# Patient Record
Sex: FEMALE | Race: BLACK OR AFRICAN AMERICAN | Employment: OTHER | URBAN - METROPOLITAN AREA
[De-identification: names, ages, dates, MRNs, and addresses within clinical notes are randomized per-mention and may not be internally consistent; named-entity substitution may affect disease eponyms.]

---

## 2017-03-06 RX ORDER — LORATADINE 10 MG/1
TABLET ORAL
Qty: 30 TAB | Refills: 7 | Status: SHIPPED | OUTPATIENT
Start: 2017-03-06 | End: 2017-11-07 | Stop reason: SDUPTHER

## 2017-03-06 RX ORDER — HYDROCHLOROTHIAZIDE 25 MG/1
TABLET ORAL
Qty: 90 TAB | Refills: 1 | Status: SHIPPED | OUTPATIENT
Start: 2017-03-06 | End: 2017-08-30 | Stop reason: SDUPTHER

## 2017-03-30 ENCOUNTER — HOSPITAL ENCOUNTER (EMERGENCY)
Age: 67
Discharge: HOME | End: 2017-03-30
Attending: EMERGENCY MEDICINE | Admitting: EMERGENCY MEDICINE
Payer: MEDICARE

## 2017-03-30 VITALS
SYSTOLIC BLOOD PRESSURE: 137 MMHG | HEART RATE: 79 BPM | TEMPERATURE: 98 F | OXYGEN SATURATION: 97 % | BODY MASS INDEX: 28.35 KG/M2 | WEIGHT: 160 LBS | RESPIRATION RATE: 16 BRPM | HEIGHT: 63 IN | DIASTOLIC BLOOD PRESSURE: 72 MMHG

## 2017-03-30 DIAGNOSIS — L30.9 DERMATITIS: ICD-10-CM

## 2017-03-30 DIAGNOSIS — J30.2 SEASONAL ALLERGIC RHINITIS, UNSPECIFIED ALLERGIC RHINITIS TRIGGER: Primary | ICD-10-CM

## 2017-03-30 PROCEDURE — 74011250637 HC RX REV CODE- 250/637: Performed by: EMERGENCY MEDICINE

## 2017-03-30 PROCEDURE — 99284 EMERGENCY DEPT VISIT MOD MDM: CPT

## 2017-03-30 PROCEDURE — 74011636637 HC RX REV CODE- 636/637: Performed by: EMERGENCY MEDICINE

## 2017-03-30 RX ORDER — CETIRIZINE HCL 10 MG
10 TABLET ORAL DAILY
Qty: 20 TAB | Refills: 0 | Status: SHIPPED | OUTPATIENT
Start: 2017-03-30 | End: 2018-08-13 | Stop reason: ALTCHOICE

## 2017-03-30 RX ORDER — PREDNISONE 20 MG/1
20 TABLET ORAL DAILY
Qty: 5 TAB | Refills: 0 | Status: SHIPPED | OUTPATIENT
Start: 2017-03-30 | End: 2017-04-04

## 2017-03-30 RX ORDER — PREDNISONE 20 MG/1
20 TABLET ORAL
Status: COMPLETED | OUTPATIENT
Start: 2017-03-30 | End: 2017-03-30

## 2017-03-30 RX ORDER — FAMOTIDINE 20 MG/1
20 TABLET, FILM COATED ORAL
Status: COMPLETED | OUTPATIENT
Start: 2017-03-30 | End: 2017-03-30

## 2017-03-30 RX ADMIN — PREDNISONE 20 MG: 20 TABLET ORAL at 14:16

## 2017-03-30 RX ADMIN — FAMOTIDINE 20 MG: 20 TABLET, FILM COATED ORAL at 14:16

## 2017-03-30 NOTE — ED PROVIDER NOTES
HPI Comments: Monica Locke is a 75 y.o. female with PMHx significant for HTN, recurrent sinusitis, and insomnia who presents ambulatory to the ED with c/o redness of the face with associated itching. Pt reports that this has happened 4 times this month and it usually lasts for a period of two days. Her face becomes erythematous and swollen suddenly and slowly decreases back to normal. Pt reports trying Benadryl and Cortizone-10 cream without much relief. Pt denies using a new make up, lotion, body wash, or detergent. Pt specifically denies any chest tightness, SOB, difficulty swallowing, difficulty breathing, fevers or chills. PCP: Fiona Cervantes MD     Social hx: - Smoker, + EtOH, - Illicit Drugs    There are no other complaints, changes, or physical findings at this time. Written by Vernona Opitz, ED Scribcarmen, as dictated by Melvina Ledemza MD.      The history is provided by the patient. No  was used. Past Medical History:   Diagnosis Date    Contact dermatitis and other eczema, due to unspecified cause     Hot flashes, menopausal 4/21/2010    HTN (hypertension) 3/29/2010    Insomnia 3/29/2010    Knee pain, right 6/4/2015    Osteopenia 4/21/2010    Prediabetes 6/8/2012    Recurrent sinusitis 3/7/2016    Screening for osteoporosis 3/7/2016       Past Surgical History:   Procedure Laterality Date    ENDOSCOPY, COLON, DIAGNOSTIC      in 2009    HX HYSTERECTOMY           Family History:   Problem Relation Age of Onset    Stroke Mother     Hypertension Sister     Hypertension Brother     Hypertension Sister     Hypertension Brother        Social History     Social History    Marital status:      Spouse name: N/A    Number of children: N/A    Years of education: N/A     Occupational History    Not on file.      Social History Main Topics    Smoking status: Never Smoker    Smokeless tobacco: Never Used      Comment: quit 30 yrs ago    Alcohol use No Comment: occation    Drug use: No    Sexual activity: Yes     Partners: Male     Other Topics Concern    Not on file     Social History Narrative         ALLERGIES: Review of patient's allergies indicates no known allergies. Review of Systems   Constitutional: Negative for chills and fever. HENT: Negative for congestion, rhinorrhea and trouble swallowing. Eyes: Negative for visual disturbance. Respiratory: Negative for cough, chest tightness and shortness of breath. Cardiovascular: Negative for chest pain. Gastrointestinal: Negative for abdominal pain, nausea and vomiting. Genitourinary: Negative for difficulty urinating and dysuria. Musculoskeletal: Negative for arthralgias, back pain and neck pain. Skin: Positive for color change (redness) and rash. Neurological: Negative for dizziness, weakness and headaches. Vitals:    03/30/17 1322   BP: 137/72   Pulse: 79   Resp: 16   Temp: 98 °F (36.7 °C)   SpO2: 97%   Weight: 72.6 kg (160 lb)   Height: 5' 3\" (1.6 m)            Physical Exam   Constitutional: She is oriented to person, place, and time. She appears well-developed and well-nourished. Eyes: EOM are normal.   Neck: Normal range of motion. Cardiovascular: Normal rate, regular rhythm, normal heart sounds and intact distal pulses. Pulmonary/Chest: Effort normal and breath sounds normal.   - Respiratory distress. Abdominal: Soft. Bowel sounds are normal.   Neurological: She is alert and oriented to person, place, and time. Skin: Skin is warm and dry.   + Mild erythema of forehead and cheeks. - Angioedema. Nursing note and vitals reviewed.        MDM  Number of Diagnoses or Management Options  Dermatitis:   Seasonal allergic rhinitis, unspecified allergic rhinitis trigger:   Diagnosis management comments: DDx: Contact dermatitis, seasonal allergies, medication reaction, autoimmune disorder       Amount and/or Complexity of Data Reviewed  Review and summarize past medical records: yes    Patient Progress  Patient progress: stable        Procedures  Progress note:  2:03 PM  Pt agrees to follow up as recommended. All questions were answered. Her vital signs are stable for discharge. Written by BRI Flowers, as dictated by Yon Arnold MD.    MEDICATIONS GIVEN:  Medications   predniSONE (DELTASONE) tablet 20 mg (not administered)   famotidine (PEPCID) tablet 20 mg (not administered)       IMPRESSION:  1. Seasonal allergic rhinitis, unspecified allergic rhinitis trigger    2. Dermatitis        PLAN:  1. Current Discharge Medication List      START taking these medications    Details   predniSONE (DELTASONE) 20 mg tablet Take 1 Tab by mouth daily for 5 days. With Breakfast  Qty: 5 Tab, Refills: 0      camphor-menthol (SARNA ANTI-ITCH) 0.5-0.5 % lotion Apply to affected areas 1-2 times a day as need for itching  Qty: 1 Bottle, Refills: 0      cetirizine (ZYRTEC) 10 mg tablet Take 1 Tab by mouth daily. Qty: 20 Tab, Refills: 0         CONTINUE these medications which have NOT CHANGED    Details   aspirin 81 mg chewable tablet Take 1 Tab by mouth daily. Qty: 30 Tab, Refills: 11         STOP taking these medications       loratadine-pseudoephedrine (CLARITIN-D 24 HOUR)  mg per tablet Comments:   Reason for Stoppin.   Follow-up Information     Follow up With Details Comments Contact Info    [facility] EMERGENCY DEPT  If symptoms worsen [address] [facility] Tawanna Morrison MD Schedule an appointment as soon as possible for a visit If symptoms worsen [address] [place] [address]  [phone]          Return to ED if worse     DISCHARGE NOTE:  2:07 PM  The patient is ready for discharge. The patients signs, symptoms, diagnosis, and instructions for discharge have been discussed and the pt has conveyed their understanding.  The patient is to follow up as recommended with Audrey Sales Darshan Cantor MD or return to the ER should their symptoms worsen. Plan has been discussed and patient has conveyed their agreement. This note is prepared by Elkin Linares, acting as Scribe for Trace Esquivel MD.    Trace Esquivel MD: The scribe's documentation has been prepared under my direction and personally reviewed by me in its entirety. I confirm that the note above accurately reflects all work, treatment, procedures, and medical decision making performed by me.

## 2017-03-30 NOTE — DISCHARGE INSTRUCTIONS
Managing Your Allergies: Care Instructions  Your Care Instructions  Managing your allergies is an important part of staying healthy. Your doctor will help you find out what may be causing the allergies. Common causes of allergy symptoms are house dust and dust mites, animal dander, mold, and pollen. As soon as you know what triggers your symptoms, try to reduce your exposure to your triggers. This can help prevent allergy symptoms, asthma, and other health problems. Ask your doctor about allergy medicine or immunotherapy. These treatments may help reduce or prevent allergy symptoms. Follow-up care is a key part of your treatment and safety. Be sure to make and go to all appointments, and call your doctor if you are having problems. It's also a good idea to know your test results and keep a list of the medicines you take. How can you care for yourself at home? · If you think that dust or dust mites are causing your allergies:  ¨ Wash sheets, pillowcases, and other bedding every week in hot water. ¨ Use airtight, dust-proof covers for pillows, duvets, and mattresses. Avoid plastic covers, because they tend to tear quickly and do not \"breathe. \" Wash according to the instructions. ¨ Remove extra blankets and pillows that you don't need. ¨ Use blankets that are machine-washable. ¨ Don't use home humidifiers. They can help mites live longer. · Use air-conditioning. Change or clean all filters every month. Keep windows closed. Use high-efficiency air filters. Don't use window or attic fans, which draw dust into the air. · If you're allergic to pet dander, keep pets outside or, at the very least, out of your bedroom. Old carpet and cloth-covered furniture can hold a lot of animal dander. You may need to replace them. · Look for signs of cockroaches. Use cockroach baits to get rid of them. Then clean your home well. · If you're allergic to mold, don't keep indoor plants, because molds can grow in soil.  Get rid of furniture, rugs, and drapes that smell musty. Check for mold in the bathroom. · If you're allergic to pollen, stay inside when pollen counts are high. · Don't smoke or let anyone else smoke in your house. Don't use fireplaces or wood-burning stoves. Avoid paint fumes, perfumes, and other strong odors. When should you call for help? Give an epinephrine shot if:  · You think you are having a severe allergic reaction. · You have symptoms in more than one body area, such as mild nausea and an itchy mouth. After giving an epinephrine shot call 911, even if you feel better. Call 911 if:  · You have symptoms of a severe allergic reaction. These may include:  ¨ Sudden raised, red areas (hives) all over your body. ¨ Swelling of the throat, mouth, lips, or tongue. ¨ Trouble breathing. ¨ Passing out (losing consciousness). Or you may feel very lightheaded or suddenly feel weak, confused, or restless. · You have been given an epinephrine shot, even if you feel better. Call your doctor now or seek immediate medical care if:  · You have symptoms of an allergic reaction, such as:  ¨ A rash or hives (raised, red areas on the skin). ¨ Itching. ¨ Swelling. ¨ Belly pain, nausea, or vomiting. Watch closely for changes in your health, and be sure to contact your doctor if:  · Your allergies get worse. · You need help controlling your allergies. · You have questions about allergy testing. · You do not get better as expected. Where can you learn more? Go to http://gosia-ailyn.info/. Enter L249 in the search box to learn more about \"Managing Your Allergies: Care Instructions. \"  Current as of: February 12, 2016  Content Version: 11.2  © 5203-7663 [employer]. Care instructions adapted under license by [employer] (which disclaims liability or warranty for this information).  If you have questions about a medical condition or this instruction, always ask your healthcare jordyn. [address] any warranty or liability for your use of this information. Dermatitis: Care Instructions  Your Care Instructions  Dermatitis is the general name used for any rash or inflammation of the skin. Different kinds of dermatitis cause different kinds of rashes. Common causes of a rash include new medicines, plants (such as poison oak or poison ivy), heat, and stress. Certain illnesses can also cause a rash. An allergic reaction to something that touches your skin, such as latex, nickel, or poison ivy, is called contact dermatitis. Contact dermatitis may also be caused by something that irritates the skin, such as bleach, a chemical, or soap. These types of rashes cannot be spread from person to person. How long your rash will last depends on what caused it. Rashes may last a few days or months. Follow-up care is a key part of your treatment and safety. Be sure to make and go to all appointments, and call your doctor if you are having problems. It's also a good idea to know your test results and keep a list of the medicines you take. How can you care for yourself at home? · Do not scratch the rash. Cut your nails short, and file them smooth. Or wear gloves if this helps keep you from scratching. · Wash the area with water only. Pat dry. · Put cold, wet cloths on the rash to reduce itching. · Keep cool, and stay out of the sun. · Leave the rash open to the air as much as possible. · If the rash itches, use hydrocortisone cream. Follow the directions on the label. Calamine lotion may help for plant rashes. · Take an over-the-counter antihistamine, such as diphenhydramine (Benadryl) or loratadine (Claritin), to help calm the itching. Read and follow all instructions on the label. · If your doctor prescribed a cream, use it as directed. If your doctor prescribed medicine, take it exactly as directed. When should you call for help?   Call your doctor now or seek immediate medical care if:  · You have symptoms of infection, such as:  ¨ Increased pain, swelling, warmth, or redness. ¨ Red streaks leading from the area. ¨ Pus draining from the area. ¨ A fever. · You have joint pain along with the rash. Watch closely for changes in your health, and be sure to contact your doctor if:  · Your rash is changing or getting worse. · You are not getting better as expected. Where can you learn more? Go to http://gosia-ailyn.info/. Enter [phone] [id number] [id number] in the search box to learn more about \"Dermatitis: Care Instructions. \"  Current as of: October 13, 2016  Content Version: 11.2  © 3092-4774 [employer]. Care instructions adapted under license by [employer] (which disclaims liability or warranty for this information). If you have questions about a medical condition or this instruction, always ask your healthcare professional. [address] any warranty or liability for your use of this information.

## 2017-03-30 NOTE — ED NOTES
Discharge instructions reviewed with patient. Patient able to verbalize events which would require immediate follow up.   Patient left ambulatory

## 2017-04-22 ENCOUNTER — APPOINTMENT (OUTPATIENT)
Dept: GENERAL RADIOLOGY | Age: 67
End: 2017-04-22
Attending: NURSE PRACTITIONER
Payer: COMMERCIAL

## 2017-04-22 ENCOUNTER — HOSPITAL ENCOUNTER (EMERGENCY)
Age: 67
Discharge: HOME | End: 2017-04-22
Attending: EMERGENCY MEDICINE | Admitting: EMERGENCY MEDICINE
Payer: COMMERCIAL

## 2017-04-22 VITALS
RESPIRATION RATE: 19 BRPM | WEIGHT: 160 LBS | OXYGEN SATURATION: 96 % | SYSTOLIC BLOOD PRESSURE: 151 MMHG | HEIGHT: 64 IN | TEMPERATURE: 99.6 F | DIASTOLIC BLOOD PRESSURE: 79 MMHG | HEART RATE: 94 BPM | BODY MASS INDEX: 27.31 KG/M2

## 2017-04-22 DIAGNOSIS — J06.9 ACUTE UPPER RESPIRATORY INFECTION: Primary | ICD-10-CM

## 2017-04-22 PROCEDURE — 71020 XR CHEST PA LAT: CPT

## 2017-04-22 PROCEDURE — 99282 EMERGENCY DEPT VISIT SF MDM: CPT

## 2017-04-22 RX ORDER — AZITHROMYCIN 250 MG/1
TABLET, FILM COATED ORAL
Qty: 6 TAB | Refills: 0 | Status: SHIPPED | OUTPATIENT
Start: 2017-04-22 | End: 2017-04-27

## 2017-04-22 RX ORDER — BENZONATATE 100 MG/1
100 CAPSULE ORAL
Qty: 30 CAP | Refills: 0 | Status: SHIPPED | OUTPATIENT
Start: 2017-04-22 | End: 2017-04-29

## 2017-04-22 RX ORDER — GUAIFENESIN 400 MG/1
400 TABLET ORAL
Qty: 15 TAB | Refills: 0 | Status: SHIPPED | OUTPATIENT
Start: 2017-04-22 | End: 2018-08-13 | Stop reason: ALTCHOICE

## 2017-04-22 RX ORDER — ALBUTEROL SULFATE 90 UG/1
2 AEROSOL, METERED RESPIRATORY (INHALATION)
Qty: 1 INHALER | Refills: 0 | Status: SHIPPED | OUTPATIENT
Start: 2017-04-22 | End: 2018-08-13 | Stop reason: ALTCHOICE

## 2017-04-22 NOTE — ED NOTES
Patient (s)  given copy of dc instructions and 4 script(s). Patient(s)  verbalized understanding of instructions and script (s). Patient given a current medication reconciliation form and verbalized understanding of their medications. Patient (s) verbalized understanding of the importance of discussing medications with  his or her physician or clinic when they follow up. Patient alert and oriented and in no acute distress. Pt verbalizes pain scale of 5 out of 10. Patient discharged home ambulatory .     Patient was discharged by the provider and declined a wheelchair at discharge

## 2017-04-22 NOTE — DISCHARGE INSTRUCTIONS
Learning About COPD and Upper Respiratory Infections  What are upper respiratory infections? An upper respiratory infection, also called a URI, is an infection of the nose, sinuses, or throat. Viruses or bacteria can cause URIs. Colds, the flu, and sinusitis are examples of URIs. These infections are spread by coughs, sneezes, and close contact. How do these infections affect COPD? A URI can worsen COPD symptoms, such as having too much mucus in your lungs, coughing, or being short of breath. What can you do to manage most infections at home? · Do not smoke. Avoid secondhand smoke. · Take an over-the-counter pain medicine, such as acetaminophen (Tylenol), ibuprofen (Advil, Motrin), or naproxen (Aleve). Read and follow all instructions on the label. · Be careful when taking over-the-counter cold or flu medicines and Tylenol at the same time. Many of these medicines have acetaminophen, which is Tylenol. Read the labels to make sure that you are not taking more than the recommended dose. Too much acetaminophen (Tylenol) can be harmful. · If your doctor prescribed antibiotics, take them as directed. Do not stop taking them just because you feel better. You need to take the full course of antibiotics. · Ask your doctor about cough medicines and decongestants. Some doctors recommend these medicines, while others feel that they do not help. · Learn breathing techniques for COPD, such as breathing through pursed lips. These techniques can help you breathe easier. What can you do to prevent these infections? Stay healthy  · Do not smoke. This is the most important step you can take to prevent more damage to your lungs. If you need help quitting, talk to your doctor about stop-smoking programs and medicines. These can increase your chances of quitting for good. · Avoid secondhand smoke, air pollution, and high altitudes. Also avoid cold, dry air and hot, humid air.  Stay at home with your windows closed when air pollution is bad. · Get a flu shot every year. · Get a pneumococcal vaccine shot. If you have had one before, ask your doctor whether you need another dose. · If you must be around people with colds or the flu, wash your hands often. Exercise and eat well  · If your doctor recommends it, get more exercise. Walking is a good choice. Bit by bit, increase the amount you walk every day. Try for at least 30 minutes on most days of the week. · Eat regular, well-balanced meals. Eating right keeps your energy levels up and helps your body fight infection. · Get plenty of rest and sleep. Follow-up care is a key part of your treatment and safety. Be sure to make and go to all appointments, and call your doctor if you are having problems. It's also a good idea to know your test results and keep a list of the medicines you take. Where can you learn more? Go to http://gosia-ailyn.info/. Enter Z736 in the search box to learn more about \"Learning About COPD and Upper Respiratory Infections. \"  Current as of: May 23, 2016  Content Version: 11.2  © 3511-8579 [employer]. Care instructions adapted under license by [employer] (which disclaims liability or warranty for this information). If you have questions about a medical condition or this instruction, always ask your healthcare professional. [address] any warranty or liability for your use of this information.

## 2017-04-22 NOTE — ED NOTES
Emergency Department Nursing Plan of Care       The Nursing Plan of Care is developed from the Nursing assessment and Emergency Department Attending provider initial evaluation. The plan of care may be reviewed in the ED Provider note.     The Plan of Care was developed with the following considerations:   Patient / Family readiness to learn indicated by:appropriate questions asked  Persons(s) to be included in education: patient  Barriers to Learning/Limitations:No    Signed     Clifford Mchugh    4/22/2017   10:41 AM

## 2017-04-22 NOTE — ED NOTES
Patient advised still not feeling well. Was here 2 weeks ago. Body aches started x 2 days ago, congested cough started x 5 days ago. Patient advised she has not taken any of her medications today.

## 2017-04-23 NOTE — ED PROVIDER NOTES
Patient is a 75 y.o. female presenting with cough. The history is provided by the patient. No  was used. Cough   This is a new problem. The current episode started more than 2 days ago. The problem occurs hourly. The problem has not changed since onset. The cough is productive of sputum. There has been no fever. Associated symptoms include sweats. Pertinent negatives include no chest pain, no chills, no headaches, no rhinorrhea, no myalgias, no shortness of breath and no wheezing. She has tried nothing for the symptoms. She is not a smoker. Past Medical History:   Diagnosis Date    Contact dermatitis and other eczema, due to unspecified cause     Hot flashes, menopausal 4/21/2010    HTN (hypertension) 3/29/2010    Insomnia 3/29/2010    Knee pain, right 6/4/2015    Osteopenia 4/21/2010    Prediabetes 6/8/2012    Recurrent sinusitis 3/7/2016    Screening for osteoporosis 3/7/2016       Past Surgical History:   Procedure Laterality Date    ENDOSCOPY, COLON, DIAGNOSTIC      in 2009    HX HYSTERECTOMY           Family History:   Problem Relation Age of Onset    Stroke Mother     Hypertension Sister     Hypertension Brother     Hypertension Sister     Hypertension Brother        Social History     Social History    Marital status:      Spouse name: N/A    Number of children: N/A    Years of education: N/A     Occupational History    Not on file. Social History Main Topics    Smoking status: Never Smoker    Smokeless tobacco: Never Used      Comment: quit 30 yrs ago    Alcohol use No      Comment: occation    Drug use: No    Sexual activity: Yes     Partners: Male     Other Topics Concern    Not on file     Social History Narrative         ALLERGIES: Review of patient's allergies indicates no known allergies. Review of Systems   Constitutional: Negative for chills, fatigue and fever. HENT: Negative for rhinorrhea. Respiratory: Positive for cough. Negative for shortness of breath and wheezing. Cardiovascular: Negative for chest pain and palpitations. Gastrointestinal: Negative for abdominal pain. Musculoskeletal: Negative for arthralgias, myalgias, neck pain and neck stiffness. Skin: Negative for pallor and rash. Neurological: Negative for dizziness, tremors, weakness and headaches. Hematological: Negative for adenopathy. Psychiatric/Behavioral: Negative for agitation and behavioral problems. All other systems reviewed and are negative. Vitals:    04/22/17 0953   BP: 151/79   Pulse: 94   Resp: 19   Temp: 99.6 °F (37.6 °C)   SpO2: 96%   Weight: 72.6 kg (160 lb)   Height: 5' 3.5\" (1.613 m)            Physical Exam   Constitutional: She is oriented to person, place, and time. She appears well-developed and well-nourished. No distress. HENT:   Head: Normocephalic and atraumatic. Right Ear: External ear normal.   Left Ear: External ear normal.   Nose: Nose normal.   Mouth/Throat: Oropharynx is clear and moist.   Harsh loose co ugh   Eyes: Conjunctivae are normal.   Neck: Normal range of motion. Neck supple. Cardiovascular: Normal rate, regular rhythm and normal heart sounds. Pulmonary/Chest: Effort normal and breath sounds normal. No respiratory distress. She has no wheezes. Abdominal: Soft. Bowel sounds are normal. There is no tenderness. Musculoskeletal: Normal range of motion. Lymphadenopathy:     She has no cervical adenopathy. Neurological: She is alert and oriented to person, place, and time. No cranial nerve deficit. Coordination normal.   Skin: Skin is warm and dry. No rash noted. Psychiatric: She has a normal mood and affect. Her behavior is normal. Judgment and thought content normal.   Nursing note and vitals reviewed.        MDM  Number of Diagnoses or Management Options  Acute upper respiratory infection:   Diagnosis management comments: DDX URI pneumonia bronchitis       Amount and/or Complexity of Data Reviewed  Tests in the radiology section of CPT®: ordered and reviewed  Discuss the patient with other providers: yes      ED Course       Procedures      Pt has been reevaluated. There are no new complaints, changes, or physical findings at this time. Medications have been reviewed w/ pt and/or family. Pt and/or family's questions have been answered. Pt and/or family expressed good understanding of the dx/tx/rx and is in agreement with plan of care. Pt instructed and agreed to f/u w/PCPand to return to ED upon further deterioration. Pt is ready for discharge. LABORATORY TESTS:  No results found for this or any previous visit (from the past 12 hour(s)). IMAGING RESULTS:  XR CHEST PA LAT   Final Result        No results found. MEDICATIONS GIVEN:  Medications - No data to display    IMPRESSION:  1. Acute upper respiratory infection        PLAN:  1. Discharge Medication List as of 4/22/2017 11:09 AM      START taking these medications    Details   azithromycin (ZITHROMAX Z-ASHLY) 250 mg tablet As directed, Normal, Disp-6 Tab, R-0      benzonatate (TESSALON PERLES) 100 mg capsule Take 1 Cap by mouth three (3) times daily as needed for Cough for up to 7 days. , Normal, Disp-30 Cap, R-0      albuterol (PROVENTIL HFA, VENTOLIN HFA, PROAIR HFA) 90 mcg/actuation inhaler Take 2 Puffs by inhalation every four (4) hours as needed for Wheezing., Normal, Disp-1 Inhaler, R-0      guaiFENesin (ORGANIDIN) 400 mg tablet Take 1 Tab by mouth every six (6) hours as needed for Congestion. , Normal, Disp-15 Tab, R-0         CONTINUE these medications which have NOT CHANGED    Details   cetirizine (ZYRTEC) 10 mg tablet Take 1 Tab by mouth daily. , Normal, Disp-20 Tab, R-0      hydroCHLOROthiazide (HYDRODIURIL) 25 mg tablet TAKE ONE TABLET BY MOUTH DAILY, Normal, Disp-90 Tab, R-1      simvastatin (ZOCOR) 10 mg tablet TAKE ONE TABLET BY MOUTH EVERY NIGHT, Normal, Disp-90 Tab, R-1      calcium-cholecalciferol, D3, (CALTRATE 600+D) tablet Take 1 Tab by mouth daily. , Normal, Disp-60 Tab, R-11      venlafaxine (EFFEXOR) 75 mg tablet TAKE ONE TABLET BY MOUTH DAILY, Normal, Disp-30 Tab, R-8      chlorpheniramine (CHLOR-TRIMETRON) 4 mg tablet Take 2 Tabs by mouth every six (6) hours as needed for Allergies or Rhinitis. Indications: COUGH, NASAL CONGESTION, SNEEZING, VASOMOTOR RHINITIS, Print, Disp-60 Tab, R-1      carisoprodol (SOMA) 350 mg tablet Take 1 Tab by mouth two (2) times daily as needed for Muscle Spasm(s). Max Daily Amount: 700 mg., Print, Disp-15 Tab, R-0      aspirin 81 mg chewable tablet Take 1 Tab by mouth daily. , No Print, Disp-30 Tab, R-11      camphor-menthol (SARNA ANTI-ITCH) 0.5-0.5 % lotion Apply to affected areas 1-2 times a day as need for itching, Normal, Disp-1 Bottle, R-0      loratadine (CLARITIN) 10 mg tablet TAKE ONE TABLET BY MOUTH DAILY, Normal, Disp-30 Tab, R-7      fluconazole (DIFLUCAN) 150 mg tablet One tablet every 3 days till finished, Normal, Disp-3 Tab, R-0      nystatin (MYCOSTATIN) topical cream Apply  to affected area two (2) times a day., Print, Disp-30 g, R-1           2.    Follow-up Information     Follow up With Details Comments [address], [place] In 3 days  [address] [facility]  [phone]              Return to ED if worse

## 2017-05-24 DIAGNOSIS — B37.9 YEAST INFECTION: ICD-10-CM

## 2017-05-25 RX ORDER — SIMVASTATIN 10 MG/1
TABLET, FILM COATED ORAL
Qty: 90 TAB | Refills: 0 | Status: SHIPPED | OUTPATIENT
Start: 2017-05-25 | End: 2017-08-30 | Stop reason: SDUPTHER

## 2017-07-20 ENCOUNTER — HOSPITAL ENCOUNTER (OUTPATIENT)
Dept: MAMMOGRAPHY | Age: 67
Discharge: HOME | End: 2017-07-20
Attending: FAMILY MEDICINE
Payer: MEDICARE

## 2017-07-20 DIAGNOSIS — Z12.31 VISIT FOR SCREENING MAMMOGRAM: ICD-10-CM

## 2017-07-20 PROCEDURE — 77067 SCR MAMMO BI INCL CAD: CPT

## 2017-08-30 DIAGNOSIS — B37.9 YEAST INFECTION: ICD-10-CM

## 2017-08-31 RX ORDER — HYDROCHLOROTHIAZIDE 25 MG/1
TABLET ORAL
Qty: 90 TAB | Refills: 0 | Status: SHIPPED | OUTPATIENT
Start: 2017-08-31 | End: 2017-11-26 | Stop reason: SDUPTHER

## 2017-08-31 RX ORDER — SIMVASTATIN 10 MG/1
TABLET, FILM COATED ORAL
Qty: 90 TAB | Refills: 0 | Status: SHIPPED | OUTPATIENT
Start: 2017-08-31 | End: 2017-11-26 | Stop reason: SDUPTHER

## 2017-09-27 ENCOUNTER — OFFICE VISIT (OUTPATIENT)
Dept: FAMILY MEDICINE CLINIC | Age: 67
End: 2017-09-27

## 2017-09-27 VITALS
WEIGHT: 160 LBS | BODY MASS INDEX: 27.31 KG/M2 | HEIGHT: 64 IN | RESPIRATION RATE: 14 BRPM | HEART RATE: 73 BPM | OXYGEN SATURATION: 95 % | DIASTOLIC BLOOD PRESSURE: 88 MMHG | SYSTOLIC BLOOD PRESSURE: 163 MMHG | TEMPERATURE: 98.1 F

## 2017-09-27 DIAGNOSIS — Z13.39 SCREENING FOR ALCOHOLISM: ICD-10-CM

## 2017-09-27 DIAGNOSIS — R73.03 PREDIABETES: ICD-10-CM

## 2017-09-27 DIAGNOSIS — Z12.11 SCREEN FOR COLON CANCER: ICD-10-CM

## 2017-09-27 DIAGNOSIS — Z12.31 ENCOUNTER FOR SCREENING MAMMOGRAM FOR MALIGNANT NEOPLASM OF BREAST: ICD-10-CM

## 2017-09-27 DIAGNOSIS — M89.9 DISORDER OF BONE AND CARTILAGE, UNSPECIFIED: ICD-10-CM

## 2017-09-27 DIAGNOSIS — M85.80 OSTEOPENIA, UNSPECIFIED LOCATION: ICD-10-CM

## 2017-09-27 DIAGNOSIS — M94.9 DISORDER OF BONE AND CARTILAGE, UNSPECIFIED: ICD-10-CM

## 2017-09-27 DIAGNOSIS — Z23 ENCOUNTER FOR IMMUNIZATION: Primary | ICD-10-CM

## 2017-09-27 DIAGNOSIS — Z13.31 SCREENING FOR DEPRESSION: ICD-10-CM

## 2017-09-27 LAB
BILIRUB UR QL STRIP: NEGATIVE
GLUCOSE UR-MCNC: NEGATIVE MG/DL
KETONES P FAST UR STRIP-MCNC: NEGATIVE MG/DL
PH UR STRIP: 7.5 [PH] (ref 4.6–8)
PROT UR QL STRIP: NEGATIVE MG/DL
SP GR UR STRIP: 1.01 (ref 1–1.03)
UA UROBILINOGEN AMB POC: NORMAL (ref 0.2–1)
URINALYSIS CLARITY POC: NORMAL
URINALYSIS COLOR POC: YELLOW
URINE BLOOD POC: NEGATIVE
URINE LEUKOCYTES POC: NORMAL
URINE NITRITES POC: NEGATIVE

## 2017-09-27 NOTE — PATIENT INSTRUCTIONS
Vaccine Information Statement    Influenza (Flu) Vaccine (Inactivated or Recombinant): What you need to know    Many Vaccine Information Statements are available in Nepali and other languages. See www.immunize.org/vis  Hojas de Información Sobre Vacunas están disponibles en Español y en muchos otros idiomas. Visite www.immunize.org/vis    1. Why get vaccinated? Influenza (flu) is a contagious disease that spreads around the [place] every year, usually between October and May. Flu is caused by influenza viruses, and is spread mainly by coughing, sneezing, and close contact. Anyone can get flu. Flu strikes suddenly and can last several days. Symptoms vary by age, but can include:   fever/chills   sore throat   muscle aches   fatigue   cough   headache    runny or stuffy nose    Flu can also lead to pneumonia and blood infections, and cause diarrhea and seizures in children. If you have a medical condition, such as heart or lung disease, flu can make it worse. Flu is more dangerous for some people. Infants and young children, people 72years of age and older, pregnant women, and people with certain health conditions or a weakened immune system are at greatest risk. Each year thousands of people in the [facility] die from flu, and many more are hospitalized. Flu vaccine can:   keep you from getting flu,   make flu less severe if you do get it, and   keep you from spreading flu to your family and other people. 2. Inactivated and recombinant flu vaccines    A dose of flu vaccine is recommended every flu season. Children 6 months through 6years of age may need two doses during the same flu season. Everyone else needs only one dose each flu season.        Some inactivated flu vaccines contain a very small amount of a mercury-based preservative called thimerosal. Studies have not shown thimerosal in vaccines to be harmful, but flu vaccines that do not contain thimerosal are available. There is no live flu virus in flu shots. They cannot cause the flu. There are many flu viruses, and they are always changing. Each year a new flu vaccine is made to protect against three or four viruses that are likely to cause disease in the upcoming flu season. But even when the vaccine doesnt exactly match these viruses, it may still provide some protection    Flu vaccine cannot prevent:   flu that is caused by a virus not covered by the vaccine, or   illnesses that look like flu but are not. It takes about 2 weeks for protection to develop after vaccination, and protection lasts through the flu season. 3. Some people should not get this vaccine    Tell the person who is giving you the vaccine:     If you have any severe, life-threatening allergies. If you ever had a life-threatening allergic reaction after a dose of flu vaccine, or have a severe allergy to any part of this vaccine, you may be advised not to get vaccinated. Most, but not all, types of flu vaccine contain a small amount of egg protein.  If you ever had Guillain-Barré Syndrome (also called GBS). Some people with a history of GBS should not get this vaccine. This should be discussed with your doctor.  If you are not feeling well. It is usually okay to get flu vaccine when you have a mild illness, but you might be asked to come back when you feel better. 4. Risks of a vaccine reaction    With any medicine, including vaccines, there is a chance of reactions. These are usually mild and go away on their own, but serious reactions are also possible. Most people who get a flu shot do not have any problems with it.      Minor problems following a flu shot include:    soreness, redness, or swelling where the shot was given     hoarseness   sore, red or itchy eyes   cough   fever   aches   headache   itching   fatigue  If these problems occur, they usually begin soon after the shot and last 1 or 2 days. More serious problems following a flu shot can include the following:     There may be a small increased risk of Guillain-Barré Syndrome (GBS) after inactivated flu vaccine. This risk has been estimated at 1 or 2 additional cases per million people vaccinated. This is much lower than the risk of severe complications from flu, which can be prevented by flu vaccine.  Young children who get the flu shot along with pneumococcal vaccine (PCV13) and/or DTaP vaccine at the same time might be slightly more likely to have a seizure caused by fever. Ask your doctor for more information. Tell your doctor if a child who is getting flu vaccine has ever had a seizure. Problems that could happen after any injected vaccine:      People sometimes faint after a medical procedure, including vaccination. Sitting or lying down for about 15 minutes can help prevent fainting, and injuries caused by a fall. Tell your doctor if you feel dizzy, or have vision changes or ringing in the ears.  Some people get severe pain in the shoulder and have difficulty moving the arm where a shot was given. This happens very rarely.  Any medication can cause a severe allergic reaction. Such reactions from a vaccine are very rare, estimated at about 1 in a million doses, and would happen within a few minutes to a few hours after the vaccination. As with any medicine, there is a very remote chance of a vaccine causing a serious injury or death. The safety of vaccines is always being monitored. For more information, visit: www.cdc.gov/vaccinesafety/    5. What if there is a serious reaction? What should I look for?  Look for anything that concerns you, such as signs of a severe allergic reaction, very high fever, or unusual behavior.     Signs of a severe allergic reaction can include hives, swelling of the face and throat, difficulty breathing, a fast heartbeat, dizziness, and weakness  usually within a few minutes to a few hours after the vaccination. What should I do?  If you think it is a severe allergic reaction or other emergency that cant wait, call 9-1-1 and get the person to the nearest hospital. Otherwise, call your doctor.  Reactions should be reported to the Vaccine Adverse Event Reporting System (VAERS). Your doctor should file this report, or you can do it yourself through  the VAERS web site at www.vaers. [facility].gov, or by calling 8-623.592.5695. VAERS does not give medical advice. 6. The National Vaccine Injury Compensation Program    The [facility] Vaccine Injury Compensation Program (VICP) is a federal program that was created to compensate people who may have been injured by certain vaccines. Persons who believe they may have been injured by a vaccine can learn about the program and about filing a claim by calling 0-400.872.1022 or visiting the [employer] website at www.[facility].gov/vaccinecompensation. There is a time limit to file a claim for compensation. 7. How can I learn more?  Ask your healthcare provider. He or she can give you the vaccine package insert or suggest other sources of information.  Call your local or state health department.  Contact the Centers for Disease Control and Prevention (CDC):  - Call 1-598.774.7500 (5-191-KAZ-INFO) or  - Visit CDCs website at www.cdc.gov/flu    Vaccine Information Statement   Inactivated Influenza Vaccine   8/7/2015  41 GISEL Rhodes [id number]    Department of Health and Human Services  Centers for Disease Control and Prevention    Office Use Only    Medicare Wellness Visit, Female    The best way to live healthy is to have a healthy lifestyle by eating a well-balanced diet, exercising regularly, limiting alcohol and stopping smoking. Regular physical exams and screening tests are another way to keep healthy. Preventive exams provided by your health care provider can find health problems before they become diseases or illnesses. Preventive services including immunizations, screening tests, monitoring and exams can help you take care of your own health. All people over age 72 should have a pneumovax  and and a prevnar shot to prevent pneumonia. These are once in a lifetime unless you and your provider decide differently. All people over 65 should have a yearly flu shot and a tetanus vaccine every 10 years. A bone mass density to screen for osteoporosis or thinning of the bones should be done every 2 years after 65. Screening for diabetes mellitus with a blood sugar test should be done every year. Glaucoma is a disease of the eye due to increased ocular pressure that can lead to blindness and it should be done every year by an eye professional.    Cardiovascular screening tests that check for elevated lipids (fatty part of blood) which can lead to heart disease and strokes should be done every 5 years. Colorectal screening that evaluates for blood or polyps in your colon should be done yearly as a stool test or every five years as a flexible sigmoidoscope or every 10 years as a colonoscopy up to age 76. Breast cancer screening with a mammogram is recommended biennially  for women age 54-69. Screening for cervical cancer with a pap smear and pelvic exam is recommended for women after age 72 years every 2 years up to age 79 or when the provider and patient decide to stop. If there is a history of cervical abnormalities or other increased risk for cancer then the test is recommended yearly. Hepatitis C screening is also recommended for anyone born between 80 through [address]. A shingles vaccine is also recommended once in a lifetime after age 61. Your Medicare Wellness Exam is recommended annually.     Here is a list of your current Health Maintenance items with a due date:  Health Maintenance Due   Topic Date Due    DTaP/Tdap/Td  (1 - Tdap) 03/05/1970    Shingles Vaccine  01/05/2009    Glaucoma Screening 03/05/2014    Pneumococcal Vaccine (2 of 2 - PCV13) 11/07/2016    Annual Well Visit  03/08/2017    Flu Vaccine  08/01/2017            Kidney Disease and Diabetes: Care Instructions  Your Care Instructions  When you have diabetes, your body cannot make enough insulin or use it the way it should. Your body needs insulin to help sugar move from the blood to the cells. Without it, your blood sugar gets too high. High blood sugar damages your kidneys and makes it hard for them to filter blood. This causes fluid and waste to build up in your blood. If you have diabetes, it is very important to keep your blood sugar in your target range. There are many steps you can take to do this. If you can control your blood sugar, you will have the best chance to slow or stop damage to your kidneys. Follow-up care is a key part of your treatment and safety. Be sure to make and go to all appointments, and call your doctor if you are having problems. It's also a good idea to know your test results and keep a list of the medicines you take. How can you care for yourself at home? To control your diabetes and slow or stop damage to your kidneys  · Keep your blood sugar in your target range. The American Diabetes Association recommends a hemoglobin A1c (Hb A1c) target level of less than 7%. Talk to your doctor about your target. The lower your A1c, the better your chance of stopping kidney damage. · Keep your blood pressure in your target range. Doctors recommend specific types of blood pressure medicines for people who have diabetes and kidney disease. Examples include ACE inhibitors and angiotensin II receptor blockers (ARBs). Your doctor may have you take one of these even if you don't have high blood pressure. · Take all of your medicines. You may have several. For example, you may take medicines for diabetes, cholesterol, and blood pressure.  It's very important to take all of them just as your doctor tells you to and to keep taking them. · Make good food choices. Follow an eating plan that is best for your diabetes and your kidneys. You may want to work with a dietitian to make a plan. A good plan will make sure that you spread carbohydrate throughout the day. It will also make sure that you get the right amount of salt (sodium), fluids, and protein. · Stay at a healthy weight. If you need help to lose weight, talk to your doctor or dietitian. Even small changes can make a difference. Try to be aware of your portion sizes, eat more fruits and vegetables, and add some activity to your daily routine. · Exercise. Get at least 30 minutes of activity on most days of the week. Walking is a great exercise that most people can do. Being more active can help you control your blood sugar and stay at a healthy weight. It also can help you lower cholesterol and blood pressure. To improve your kidney health  · Lower your cholesterol. Keep your LDL less than 100 mg/dL and HDL levels more than 40 mg/dL for men and 50 mg/dL for women. If you have high cholesterol, your doctor may prescribe medicine. He or she may also tell you to eat less saturated fat. · Follow your treatment plan. Check your blood sugar as many times a day as your doctor recommends. Go to all of your follow-up appointments, and be sure to have all the tests your doctor orders. Call your doctor if you think you are having a problem with your medicines. · Take a low-dose aspirin every day if your doctor suggests it. Most doctors believe this can reduce the risk of heart disease and stroke. Your risk of these diseases is much greater than your risk of kidney failure. · Avoid tobacco. Do not smoke or use other tobacco products. If you need help quitting, talk to your doctor about stop-smoking programs and medicines. These can increase your chances of quitting for good. When should you call for help? Call 911 anytime you think you may need emergency care.  For example, call if:  · You passed out (lost consciousness). Call your doctor now or seek immediate medical care if:  · You have high blood sugar that stays above the desired range after you take steps to lower blood sugar. · Your breath smells fruity. · You are confused or have trouble thinking clearly. · You feel weaker or more tired than usual.  · You are very thirsty, lightheaded, or dizzy. · You have nausea and vomiting. Watch closely for changes in your health, and be sure to contact your doctor if:  · You have new or worse swelling of your arms or feet. Where can you learn more? Go to http://gosia-ailyn.info/. Enter C726 in the search box to learn more about \"Kidney Disease and Diabetes: Care Instructions. \"  Current as of: April 3, 2017  Content Version: 11.3  © 2944-1808 [employer]. Care instructions adapted under license by [employer] (which disclaims liability or warranty for this information). If you have questions about a medical condition or this instruction, always ask your healthcare professional. [place] [address] any warranty or liability for your use of this information. Learning About Elijah Avila  What is a living will? A living will is a legal form you use to write down the kind of care you want at the end of your life. It is used by the health professionals who will treat you if you aren't able to decide for yourself. If you put your wishes in writing, your loved ones and others will know what kind of care you want. They won't need to guess. This can ease your mind and be helpful to others. A living will is not the same as an estate or property will. An estate will explains what you want to happen with your money and property after you die. Is a living will a legal document? A living will is a legal document. Each state has its own laws about living baker.  If you move to another state, make sure that your living will is legal in the state where you now live. Or you might use a universal form that has been approved by many states. This kind of form can sometimes be completed and stored online. Your electronic copy will then be available wherever you have a connection to the Internet. In most cases, doctors will respect your wishes even if you have a form from a different state. · You don't need an  to complete a living will. But legal advice can be helpful if your state's laws are unclear, your health history is complicated, or your family can't agree on what should be in your living will. · You can change your living will at any time. Some people find that their wishes about end-of-life care change as their health changes. · In addition to making a living will, think about completing a medical power of  form. This form lets you name the person you want to make end-of-life treatment decisions for you (your \"health care agent\") if you're not able to. Many hospitals and nursing homes will give you the forms you need to complete a living will and a medical power of . · Your living will is used only if you can't make or communicate decisions for yourself anymore. If you become able to make decisions again, you can accept or refuse any treatment, no matter what you wrote in your living will. · Your state may offer an online registry. This is a place where you can store your living will online so the doctors and nurses who need to treat you can find it right away. What should you think about when creating a living will? Talk about your end-of-life wishes with your family members and your doctor. Let them know what you want. That way the people making decisions for you won't be surprised by your choices. Think about these questions as you make your living will:  · Do you know enough about life support methods that might be used?  If not, talk to your doctor so you know what might be done if you can't breathe on your own, your heart stops, or you're unable to swallow. · What things would you still want to be able to do after you receive life-support methods? Would you want to be able to walk? To speak? To eat on your own? To live without the help of machines? · If you have a choice, where do you want to be cared for? In your home? At a hospital or nursing home? · Do you want certain Anabaptism practices performed if you become very ill? · If you have a choice at the end of your life, where would you prefer to die? At home? In a hospital or nursing home? Somewhere else? · Would you prefer to be buried or cremated? · Do you want your organs to be donated after you die? What should you do with your living will? · Make sure that your family members and your health care agent have copies of your living will. · Give your doctor a copy of your living will to keep in your medical record. If you have more than one doctor, make sure that each one has a copy. · You may want to put a copy of your living will where it can be easily found. Where can you learn more? Go to http://gosia-ailyn.info/. Enter J317 in the search box to learn more about \"Learning About Living Perroy. \"  Current as of: August 8, 2016  Content Version: 11.3  © 9378-5958 [employer]. Care instructions adapted under license by [employer] (which disclaims liability or warranty for this information). If you have questions about a medical condition or this instruction, always ask your healthcare professional. [place] [address] any warranty or liability for your use of this information.

## 2017-09-27 NOTE — PROGRESS NOTES
Cliff Escalera            Name and  verified          Chief Complaint   Patient presents with   [facility] Annual Wellness Visit         Health Maintenance reviewed-discussed with patient. Advance Directives Care Planning Information given, patient acknowledged understanding.

## 2017-09-27 NOTE — MR AVS SNAPSHOT
Visit Information Date & Time Provider Department Dept. Phone Encounter #  
 9/27/2017 10:45 AM Deena Dunham MD [address] [place] [address] OFFICE-ANNEX [phone] [id number] Follow-up Instructions Return in about 6 months (around 3/27/2018), or if symptoms worsen or fail to improve. Upcoming Health Maintenance Date Due DTaP/Tdap/Td series (1 - Tdap) 3/5/1970 ZOSTER VACCINE AGE 60> 1/5/2009 GLAUCOMA SCREENING Q2Y 3/5/2014 Pneumococcal 65+ Low/Medium Risk (2 of 2 - PCV13) 11/7/2016 MEDICARE YEARLY EXAM 3/8/2017 INFLUENZA AGE 9 TO ADULT 8/1/2017 BREAST CANCER SCRN MAMMOGRAM 7/20/2019 COLONOSCOPY 3/30/2020 Allergies as of 9/27/2017  Review Complete On: 9/27/2017 By: Deena Dunham MD  
 No Known Allergies Current Immunizations  Reviewed on 9/27/2017 Name Date Influenza High Dose Vaccine PF  Incomplete, 9/15/2016, 11/7/2015 Influenza Vaccine 11/17/2013 Influenza Vaccine Split 11/14/2012, 10/10/2011 Pneumococcal Conjugate (PCV-13)  Deferred (Patient Refused) Pneumococcal Polysaccharide (PPSV-23) 11/7/2015 Tdap  Incomplete,  Deferred (Patient Refused) Reviewed by Deena Dunham MD on 9/27/2017 at 12:31 PM  
 Reviewed by Deena Dunham MD on 9/27/2017 at 12:39 PM  
You Were Diagnosed With   
  
 Codes Comments Encounter for immunization    -  Primary ICD-10-CM: Y08 ICD-9-CM: V03.89 Screen for colon cancer     ICD-10-CM: Z12.11 ICD-9-CM: V76.51 Screening for depression     ICD-10-CM: Z13.89 ICD-9-CM: V79.0 Screening for alcoholism     ICD-10-CM: Z13.89 ICD-9-CM: V79.1 Encounter for screening mammogram for malignant neoplasm of breast     ICD-10-CM: Z12.31 
ICD-9-CM: V76.12 Disorder of bone and cartilage, unspecified     ICD-10-CM: M89.9, M94.9 ICD-9-CM: 733.90 Osteopenia, unspecified location     ICD-10-CM: M85.80 ICD-9-CM: 733.90 Prediabetes     ICD-10-CM: R73.03 
ICD-9-CM: 790.29 Vitals BP Pulse Temp Resp Height(growth percentile) Weight(growth percentile) 163/88 (BP 1 Location: Left arm, BP Patient Position: At rest) 73 98.1 °F (36.7 °C) (Oral) 14 5' 3.5\" (1.613 m) 160 lb (72.6 kg) SpO2 BMI OB Status Smoking Status 95% 27.9 kg/m2 Hysterectomy Never Smoker Vitals History BMI and BSA Data Body Mass Index Body Surface Area  
 27.9 kg/m 2 1.8 m 2 Preferred Pharmacy Pharmacy Name Phone [facility] [address] [place] [address], [address] [place] [address] [phone] Your Updated Medication List  
  
   
This list is accurate as of: 9/27/17 12:42 PM.  Always use your most recent med list.  
  
  
  
  
 albuterol 90 mcg/actuation inhaler Commonly known as:  PROVENTIL HFA, VENTOLIN HFA, PROAIR HFA Take 2 Puffs by inhalation every four (4) hours as needed for Wheezing. aspirin 81 mg chewable tablet Take 1 Tab by mouth daily. calcium-cholecalciferol (D3) tablet Commonly known as:  CALTRATE 600+D Take 1 Tab by mouth daily. camphor-menthol 0.5-0.5 % lotion Commonly known as:  SARNA ANTI-ITCH Apply to affected areas 1-2 times a day as need for itching  
  
 carisoprodol 350 mg tablet Commonly known as:  SOMA Take 1 Tab by mouth two (2) times daily as needed for Muscle Spasm(s). Max Daily Amount: 700 mg.  
  
 cetirizine 10 mg tablet Commonly known as:  ZyrTEC Take 1 Tab by mouth daily. chlorpheniramine 4 mg tablet Commonly known as:  CHLOR-TRIMETRON Take 2 Tabs by mouth every six (6) hours as needed for Allergies or Rhinitis. Indications: COUGH, NASAL CONGESTION, SNEEZING, VASOMOTOR RHINITIS  
  
 fluconazole 150 mg tablet Commonly known as:  DIFLUCAN One tablet every 3 days till finished  
  
 guaiFENesin 400 mg tablet Commonly known as:  Earna Cane Take 1 Tab by mouth every six (6) hours as needed for Congestion. hydroCHLOROthiazide 25 mg tablet Commonly known as:  HYDRODIURIL  
 TAKE ONE TABLET BY MOUTH DAILY  
  
 loratadine 10 mg tablet Commonly known as:  CLARITIN  
TAKE ONE TABLET BY MOUTH DAILY  
  
 nystatin topical cream  
Commonly known as:  MYCOSTATIN Apply  to affected area two (2) times a day. simvastatin 10 mg tablet Commonly known as:  ZOCOR  
TAKE ONE TABLET BY MOUTH EVERY NIGHT  
  
 varicella zoster vacine live 19,400 unit/0.65 mL Susr injection Commonly known as:  ZOSTAVAX  
1 Vial by SubCUTAneous route once for 1 dose. venlafaxine 75 mg tablet Commonly known as:  EFFEXOR  
TAKE ONE TABLET BY MOUTH DAILY Prescriptions Printed Refills  
 varicella zoster vacine live (ZOSTAVAX) 19,400 unit/0.65 mL susr injection 0 Si Vial by SubCUTAneous route once for 1 dose. Class: Print Route: SubCUTAneous We Performed the Following ADMIN INFLUENZA VIRUS VAC [ HCPCS] AMB POC URINALYSIS DIP STICK AUTO W/O MICRO [98125 CPT(R)] CBC W/O DIFF [03039 CPT(R)] [address] [[id number] HCPCS] HEMOGLOBIN A1C WITH EAG [76115 CPT(R)] INFLUENZA VIRUS VACCINE, HIGH DOSE SEASONAL, PRESERVATIVE FREE [29497 CPT(R)] LIPID PANEL [52690 CPT(R)] METABOLIC PANEL, COMPREHENSIVE [19251 CPT(R)] OCCULT BLOOD, IMMUNOASSAY (FIT) [id number] CPT(R)] LA ANNUAL ALCOHOL SCREEN 15 MIN [id number] HCPCS] LA IMMUNIZ ADMIN,1 SINGLE/COMB VAC/TOXOID [id number] CPT(R)] TETANUS, DIPHTHERIA TOXOIDS AND ACELLULAR PERTUSSIS VACCINE (TDAP), IN INDIVIDS. >=7, IM [id number] CPT(R)] TSH 3RD GENERATION [49208 CPT(R)] Follow-up Instructions Return in about 6 months (around 3/27/2018), or if symptoms worsen or fail to improve. To-Do List   
 2017 Imaging:  DEXA BONE DENSITY STUDY AXIAL Patient Instructions Vaccine Information Statement Influenza (Flu) Vaccine (Inactivated or Recombinant): What you need to know Many Vaccine Information Statements are available in Indonesian and other languages. See www.immunize.org/vis Hojas de Información Sobre Vacunas están disponibles en Español y en muchos otros idiomas. Visite www.immunize.org/vis 1. Why get vaccinated? Influenza (flu) is a contagious disease that spreads around the [place] every year, usually between October and May. Flu is caused by influenza viruses, and is spread mainly by coughing, sneezing, and close contact. Anyone can get flu. Flu strikes suddenly and can last several days. Symptoms vary by age, but can include: 
 fever/chills  sore throat  muscle aches  fatigue  cough  headache  runny or stuffy nose Flu can also lead to pneumonia and blood infections, and cause diarrhea and seizures in children. If you have a medical condition, such as heart or lung disease, flu can make it worse. Flu is more dangerous for some people. Infants and young children, people 72years of age and older, pregnant women, and people with certain health conditions or a weakened immune system are at greatest risk. Each year thousands of people in the [facility] die from flu, and many more are hospitalized. Flu vaccine can: 
 keep you from getting flu, 
 make flu less severe if you do get it, and 
 keep you from spreading flu to your family and other people. 2. Inactivated and recombinant flu vaccines A dose of flu vaccine is recommended every flu season. Children 6 months through 6years of age may need two doses during the same flu season. Everyone else needs only one dose each flu season. Some inactivated flu vaccines contain a very small amount of a mercury-based preservative called thimerosal. Studies have not shown thimerosal in vaccines to be harmful, but flu vaccines that do not contain thimerosal are available. There is no live flu virus in flu shots. They cannot cause the flu. There are many flu viruses, and they are always changing.  Each year a new flu vaccine is made to protect against three or four viruses that are likely to cause disease in the upcoming flu season. But even when the vaccine doesnt exactly match these viruses, it may still provide some protection Flu vaccine cannot prevent: 
 flu that is caused by a virus not covered by the vaccine, or 
 illnesses that look like flu but are not. It takes about 2 weeks for protection to develop after vaccination, and protection lasts through the flu season. 3. Some people should not get this vaccine Tell the person who is giving you the vaccine:  If you have any severe, life-threatening allergies. If you ever had a life-threatening allergic reaction after a dose of flu vaccine, or have a severe allergy to any part of this vaccine, you may be advised not to get vaccinated. Most, but not all, types of flu vaccine contain a small amount of egg protein.  If you ever had Guillain-Barré Syndrome (also called GBS). Some people with a history of GBS should not get this vaccine. This should be discussed with your doctor.  If you are not feeling well. It is usually okay to get flu vaccine when you have a mild illness, but you might be asked to come back when you feel better. 4. Risks of a vaccine reaction With any medicine, including vaccines, there is a chance of reactions. These are usually mild and go away on their own, but serious reactions are also possible. Most people who get a flu shot do not have any problems with it. Minor problems following a flu shot include:  
 soreness, redness, or swelling where the shot was given  hoarseness  sore, red or itchy eyes  cough  fever  aches  headache  itching  fatigue If these problems occur, they usually begin soon after the shot and last 1 or 2 days. More serious problems following a flu shot can include the following:  There may be a small increased risk of Guillain-Barré Syndrome (GBS) after inactivated flu vaccine. This risk has been estimated at 1 or 2 additional cases per million people vaccinated. This is much lower than the risk of severe complications from flu, which can be prevented by flu vaccine.  Young children who get the flu shot along with pneumococcal vaccine (PCV13) and/or DTaP vaccine at the same time might be slightly more likely to have a seizure caused by fever. Ask your doctor for more information. Tell your doctor if a child who is getting flu vaccine has ever had a seizure. Problems that could happen after any injected vaccine:  People sometimes faint after a medical procedure, including vaccination. Sitting or lying down for about 15 minutes can help prevent fainting, and injuries caused by a fall. Tell your doctor if you feel dizzy, or have vision changes or ringing in the ears.  Some people get severe pain in the shoulder and have difficulty moving the arm where a shot was given. This happens very rarely.  Any medication can cause a severe allergic reaction. Such reactions from a vaccine are very rare, estimated at about 1 in a million doses, and would happen within a few minutes to a few hours after the vaccination. As with any medicine, there is a very remote chance of a vaccine causing a serious injury or death. The safety of vaccines is always being monitored. For more information, visit: www.cdc.gov/vaccinesafety/ 
 
5. What if there is a serious reaction? What should I look for?  Look for anything that concerns you, such as signs of a severe allergic reaction, very high fever, or unusual behavior. Signs of a severe allergic reaction can include hives, swelling of the face and throat, difficulty breathing, a fast heartbeat, dizziness, and weakness  usually within a few minutes to a few hours after the vaccination. What should I do?  
 
 If you think it is a severe allergic reaction or other emergency that cant wait, call 9-1-1 and get the person to the nearest hospital. Otherwise, call your doctor.  Reactions should be reported to the Vaccine Adverse Event Reporting System (VAERS). Your doctor should file this report, or you can do it yourself through  the VAERS web site at www.vaers. [facility].gov, or by calling 6-890.273.6232. VAERS does not give medical advice. 6. The National Vaccine Injury Compensation Program 
 
The [facility] Vaccine Injury Compensation Program (VICP) is a federal program that was created to compensate people who may have been injured by certain vaccines. Persons who believe they may have been injured by a vaccine can learn about the program and about filing a claim by calling 5-139.786.8208 or visiting the [employer] website at www.[facility].gov/vaccinecompensation. There is a time limit to file a claim for compensation. 7. How can I learn more?  Ask your healthcare provider. He or she can give you the vaccine package insert or suggest other sources of information.  Call your local or state health department.  Contact the Centers for Disease Control and Prevention (CDC): 
- Call 0-182.669.6158 (9-539-KGE-INFO) or 
- Visit CDCs website at www.cdc.gov/flu Vaccine Information Statement Inactivated Influenza Vaccine 8/7/2015 
42 GISEL Mcguire [id number] Department of Health and [employer] Centers for Disease Control and Prevention Office Use Only Medicare Wellness Visit, Female The best way to live healthy is to have a healthy lifestyle by eating a well-balanced diet, exercising regularly, limiting alcohol and stopping smoking. Regular physical exams and screening tests are another way to keep healthy. Preventive exams provided by your health care provider can find health problems before they become diseases or illnesses. Preventive services including immunizations, screening tests, monitoring and exams can help you take care of your own health. All people over age 72 should have a pneumovax  and and a prevnar shot to prevent pneumonia. These are once in a lifetime unless you and your provider decide differently. All people over 65 should have a yearly flu shot and a tetanus vaccine every 10 years. A bone mass density to screen for osteoporosis or thinning of the bones should be done every 2 years after 65. Screening for diabetes mellitus with a blood sugar test should be done every year. Glaucoma is a disease of the eye due to increased ocular pressure that can lead to blindness and it should be done every year by an eye professional. 
 
Cardiovascular screening tests that check for elevated lipids (fatty part of blood) which can lead to heart disease and strokes should be done every 5 years. Colorectal screening that evaluates for blood or polyps in your colon should be done yearly as a stool test or every five years as a flexible sigmoidoscope or every 10 years as a colonoscopy up to age 76. Breast cancer screening with a mammogram is recommended biennially  for women age 54-69. Screening for cervical cancer with a pap smear and pelvic exam is recommended for women after age 72 years every 2 years up to age 79 or when the provider and patient decide to stop. If there is a history of cervical abnormalities or other increased risk for cancer then the test is recommended yearly. Hepatitis C screening is also recommended for anyone born between 80 through [address]. A shingles vaccine is also recommended once in a lifetime after age 61. Your Medicare Wellness Exam is recommended annually. Here is a list of your current Health Maintenance items with a due date: 
Health Maintenance Due Topic Date Due  
 DTaP/Tdap/Td  (1 - Tdap) 03/05/1970  Shingles Vaccine  01/05/2009  Glaucoma Screening   03/05/2014  Pneumococcal Vaccine (2 of 2 - PCV13) 11/07/2016 [address] [place] [address] Annual Well Visit  03/08/2017  Flu Vaccine  08/01/2017 Kidney Disease and Diabetes: Care Instructions Your Care Instructions When you have diabetes, your body cannot make enough insulin or use it the way it should. Your body needs insulin to help sugar move from the blood to the cells. Without it, your blood sugar gets too high. High blood sugar damages your kidneys and makes it hard for them to filter blood. This causes fluid and waste to build up in your blood. If you have diabetes, it is very important to keep your blood sugar in your target range. There are many steps you can take to do this. If you can control your blood sugar, you will have the best chance to slow or stop damage to your kidneys. Follow-up care is a key part of your treatment and safety. Be sure to make and go to all appointments, and call your doctor if you are having problems. It's also a good idea to know your test results and keep a list of the medicines you take. How can you care for yourself at home? To control your diabetes and slow or stop damage to your kidneys · Keep your blood sugar in your target range. The American Diabetes Association recommends a hemoglobin A1c (Hb A1c) target level of less than 7%. Talk to your doctor about your target. The lower your A1c, the better your chance of stopping kidney damage. · Keep your blood pressure in your target range. Doctors recommend specific types of blood pressure medicines for people who have diabetes and kidney disease. Examples include ACE inhibitors and angiotensin II receptor blockers (ARBs). Your doctor may have you take one of these even if you don't have high blood pressure. · Take all of your medicines. You may have several. For example, you may take medicines for diabetes, cholesterol, and blood pressure. It's very important to take all of them just as your doctor tells you to and to keep taking them. · Make good food choices.  Follow an eating plan that is best for your diabetes and your kidneys. You may want to work with a dietitian to make a plan. A good plan will make sure that you spread carbohydrate throughout the day. It will also make sure that you get the right amount of salt (sodium), fluids, and protein. · Stay at a healthy weight. If you need help to lose weight, talk to your doctor or dietitian. Even small changes can make a difference. Try to be aware of your portion sizes, eat more fruits and vegetables, and add some activity to your daily routine. · Exercise. Get at least 30 minutes of activity on most days of the week. Walking is a great exercise that most people can do. Being more active can help you control your blood sugar and stay at a healthy weight. It also can help you lower cholesterol and blood pressure. To improve your kidney health · Lower your cholesterol. Keep your LDL less than 100 mg/dL and HDL levels more than 40 mg/dL for men and 50 mg/dL for women. If you have high cholesterol, your doctor may prescribe medicine. He or she may also tell you to eat less saturated fat. · Follow your treatment plan. Check your blood sugar as many times a day as your doctor recommends. Go to all of your follow-up appointments, and be sure to have all the tests your doctor orders. Call your doctor if you think you are having a problem with your medicines. · Take a low-dose aspirin every day if your doctor suggests it. Most doctors believe this can reduce the risk of heart disease and stroke. Your risk of these diseases is much greater than your risk of kidney failure. · Avoid tobacco. Do not smoke or use other tobacco products. If you need help quitting, talk to your doctor about stop-smoking programs and medicines. These can increase your chances of quitting for good. When should you call for help? Call 911 anytime you think you may need emergency care. For example, call if: 
· You passed out (lost consciousness). Call your doctor now or seek immediate medical care if: 
· You have high blood sugar that stays above the desired range after you take steps to lower blood sugar. · Your breath smells fruity. · You are confused or have trouble thinking clearly. · You feel weaker or more tired than usual. 
· You are very thirsty, lightheaded, or dizzy. · You have nausea and vomiting. Watch closely for changes in your health, and be sure to contact your doctor if: 
· You have new or worse swelling of your arms or feet. Where can you learn more? Go to http://gosia-ailyn.info/. Enter C726 in the search box to learn more about \"Kidney Disease and Diabetes: Care Instructions. \" Current as of: April 3, 2017 Content Version: 11.3 © 8252-9679 [employer]. Care instructions adapted under license by [employer] (which disclaims liability or warranty for this information). If you have questions about a medical condition or this instruction, always ask your healthcare professional. [place] [address] any warranty or liability for your use of this information. Dennys Feldman 1721 What is a living will? A living will is a legal form you use to write down the kind of care you want at the end of your life. It is used by the health professionals who will treat you if you aren't able to decide for yourself. If you put your wishes in writing, your loved ones and others will know what kind of care you want. They won't need to guess. This can ease your mind and be helpful to others. A living will is not the same as an estate or property will. An estate will explains what you want to happen with your money and property after you die. Is a living will a legal document? A living will is a legal document. Each state has its own laws about living baker.  If you move to another state, make sure that your living will is legal in the state where you now live. Or you might use a universal form that has been approved by many states. This kind of form can sometimes be completed and stored online. Your electronic copy will then be available wherever you have a connection to the Internet. In most cases, doctors will respect your wishes even if you have a form from a different state. · You don't need an  to complete a living will. But legal advice can be helpful if your state's laws are unclear, your health history is complicated, or your family can't agree on what should be in your living will. · You can change your living will at any time. Some people find that their wishes about end-of-life care change as their health changes. · In addition to making a living will, think about completing a medical power of  form. This form lets you name the person you want to make end-of-life treatment decisions for you (your \"health care agent\") if you're not able to. Many hospitals and nursing homes will give you the forms you need to complete a living will and a medical power of . · Your living will is used only if you can't make or communicate decisions for yourself anymore. If you become able to make decisions again, you can accept or refuse any treatment, no matter what you wrote in your living will. · Your state may offer an online registry. This is a place where you can store your living will online so the doctors and nurses who need to treat you can find it right away. What should you think about when creating a living will? Talk about your end-of-life wishes with your family members and your doctor. Let them know what you want. That way the people making decisions for you won't be surprised by your choices. Think about these questions as you make your living will: · Do you know enough about life support methods that might be used?  If not, talk to your doctor so you know what might be done if you can't breathe on your own, your heart stops, or you're unable to swallow. · What things would you still want to be able to do after you receive life-support methods? Would you want to be able to walk? To speak? To eat on your own? To live without the help of machines? · If you have a choice, where do you want to be cared for? In your home? At a hospital or nursing home? · Do you want certain Restoration practices performed if you become very ill? · If you have a choice at the end of your life, where would you prefer to die? At home? In a hospital or nursing home? Somewhere else? · Would you prefer to be buried or cremated? · Do you want your organs to be donated after you die? What should you do with your living will? · Make sure that your family members and your health care agent have copies of your living will. · Give your doctor a copy of your living will to keep in your medical record. If you have more than one doctor, make sure that each one has a copy. · You may want to put a copy of your living will where it can be easily found. Where can you learn more? Go to http://gosia-ailyn.info/. Enter M449 in the search box to learn more about \"Learning About Living Gordo Santoro. \" Current as of: August 8, 2016 Content Version: 11.3 © 1253-3570 [employer]. Care instructions adapted under license by [employer] (which disclaims liability or warranty for this information). If you have questions about a medical condition or this instruction, always ask your healthcare professional. [place] [address] any warranty or liability for your use of this information. Introducing [facility]! Tressa Carlton introduces Backplane patient portal. Now you can access parts of your medical record, email your doctor's office, and request medication refills online. 1. In your internet browser, go to [email or web address] 2. Click on the First Time User? Click Here link in the Sign In box. You will see the New Member Sign Up page. 3. Enter your AYLIEN Access Code exactly as it appears below. You will not need to use this code after youve completed the sign-up process. If you do not sign up before the expiration date, you must request a new code. · AYLIEN Access Code: [email or web address] Expires: 2017 12:42 PM 
 
4. Enter the last four digits of your Social Security Number (xxxx) and Date of Birth (mm/dd/yyyy) as indicated and click Submit. You will be taken to the next sign-up page. 5. Create a AYLIEN ID. This will be your AYLIEN login ID and cannot be changed, so think of one that is secure and easy to remember. 6. Create a AYLIEN password. You can change your password at any time. 7. Enter your Password Reset Question and Answer. This can be used at a later time if you forget your password. 8. Enter your e-mail address. You will receive e-mail notification when new information is available in [address] 9. Click Sign Up. You can now view and download portions of your medical record. 10. Click the Download Summary menu link to download a portable copy of your medical information. If you have questions, please visit the Frequently Asked Questions section of the AYLIEN website. Remember, AYLIEN is NOT to be used for urgent needs. For medical emergencies, dial 911. Now available from your iPhone and Android! Please provide this summary of care documentation to your next provider. Your primary care clinician is listed as Tianna Tejeda. If you have any questions after today's visit, please call [phone].

## 2017-09-27 NOTE — PROGRESS NOTES
This is an Initial Medicare Annual Wellness Exam (AWV) (Performed 12 months after IPPE or effective date of Medicare Part B enrollment, Once in a lifetime)    I have reviewed the patient's medical history in detail and updated the computerized patient record. History     Present for CPE, last Complete Physical exam was few yrs ,  Up todate w/ all vaccination, last tetanus vaccine was in <5yrs    . last mammog was nl and In 2017,    last pap smear normal and was in  Few yrs ago . last colonoscopy was normal and was <8 yrs  Ago,   No past surgical hx,  last bone dexa scan was never     No family hx of breast cancer      no family hx of colon cancer, ++sexaully active and uses Safe sex,++hysically active,  compliant w/ meds, Rf needed for today for her meds. No fall not depressed    Past Medical History:   Diagnosis Date    Contact dermatitis and other eczema, due to unspecified cause     Hot flashes, menopausal 4/21/2010    HTN (hypertension) 3/29/2010    Insomnia 3/29/2010    Knee pain, right 6/4/2015    Osteopenia 4/21/2010    Prediabetes 6/8/2012    Recurrent sinusitis 3/7/2016    Screening for osteoporosis 3/7/2016      Past Surgical History:   Procedure Laterality Date    ENDOSCOPY, COLON, DIAGNOSTIC      in 2009    HX BREAST BIOPSY Left 1970    benign:  no visible scar    HX HYSTERECTOMY       Current Outpatient Prescriptions   Medication Sig Dispense Refill    simvastatin (ZOCOR) 10 mg tablet TAKE ONE TABLET BY MOUTH EVERY NIGHT 90 Tab 0    hydroCHLOROthiazide (HYDRODIURIL) 25 mg tablet TAKE ONE TABLET BY MOUTH DAILY 90 Tab 0    albuterol (PROVENTIL HFA, VENTOLIN HFA, PROAIR HFA) 90 mcg/actuation inhaler Take 2 Puffs by inhalation every four (4) hours as needed for Wheezing. 1 Inhaler 0    guaiFENesin (ORGANIDIN) 400 mg tablet Take 1 Tab by mouth every six (6) hours as needed for Congestion.  15 Tab 0    camphor-menthol (SARNA ANTI-ITCH) 0.5-0.5 % lotion Apply to affected areas 1-2 times a day as need for itching 1 Bottle 0    cetirizine (ZYRTEC) 10 mg tablet Take 1 Tab by mouth daily. 20 Tab 0    loratadine (CLARITIN) 10 mg tablet TAKE ONE TABLET BY MOUTH DAILY 30 Tab 7    fluconazole (DIFLUCAN) 150 mg tablet One tablet every 3 days till finished 3 Tab 0    calcium-cholecalciferol, D3, (CALTRATE 600+D) tablet Take 1 Tab by mouth daily. 60 Tab 11    nystatin (MYCOSTATIN) topical cream Apply  to affected area two (2) times a day. 30 g 1    venlafaxine (EFFEXOR) 75 mg tablet TAKE ONE TABLET BY MOUTH DAILY 30 Tab 8    chlorpheniramine (CHLOR-TRIMETRON) 4 mg tablet Take 2 Tabs by mouth every six (6) hours as needed for Allergies or Rhinitis. Indications: COUGH, NASAL CONGESTION, SNEEZING, VASOMOTOR RHINITIS 60 Tab 1    carisoprodol (SOMA) 350 mg tablet Take 1 Tab by mouth two (2) times daily as needed for Muscle Spasm(s). Max Daily Amount: 700 mg. 15 Tab 0    aspirin 81 mg chewable tablet Take 1 Tab by mouth daily.  27 Tab 11     No Known Allergies  Family History   Problem Relation Age of Onset    Stroke Mother     Hypertension Sister     Hypertension Brother     Hypertension Sister     Hypertension Brother      Social History   Substance Use Topics    Smoking status: Never Smoker    Smokeless tobacco: Never Used      Comment: quit 30 yrs ago    Alcohol use No      Comment: occation     Patient Active Problem List   Diagnosis Code    HTN (hypertension) I10    Insomnia G47.00    Hot flashes, menopausal N95.1    Osteopenia M85.80    Cellulitis L03.90    Candida vaginitis B37.3    Vitamin D deficiency E55.9    Hypercholesterolemia E78.00    Prediabetes R73.03    Rash, skin R21    S/P partial hysterectomy Z90.711    Need for varicella vaccine Z23    Knee pain, right M25.561    Screening for osteoporosis Z13.820    Recurrent sinusitis J32.9       Depression Risk Factor Screening:     PHQ over the last two weeks 9/27/2017   Little interest or pleasure in doing things Not at all   Feeling down, depressed or hopeless Not at all   Total Score PHQ 2 0     Alcohol Risk Factor Screening: You do not drink alcohol or very rarely. Functional Ability and Level of Safety:     Hearing Loss  Hearing is good. Activities of Daily Living  The home contains: no safety equipment  Patient does total self care    Fall RiskFall Risk Assessment, last 12 mths 9/27/2017   Able to walk? Yes   Fall in past 12 months? No       Abuse Screen  Patient is not abused    Cognitive Screening   Evaluation of Cognitive Function:  Has your family/caregiver stated any concerns about your memory: no  Normal    Patient Care Team   Patient Care Team:  Cayla Hernandez MD as PCP - General (Family Practice)    Assessment/Plan   Education and counseling provided:  Are appropriate based on today's review and evaluation  Pneumococcal Vaccine  Influenza Vaccine  Hepatitis B Vaccine  Colorectal cancer screening tests  Bone mass measurement (DEXA)    Diagnoses and all orders for this visit:    1. Encounter for immunization  -     Influenza virus vaccine ([address]) 72 years and older  -     FL IMMUNIZ ADMIN,1 SINGLE/COMB VAC/TOXOID  -     Influenza Admin (Gurdeep Umana)  -     Tetanus, Diphtheria Toxoids and Acellular Pertussis Vaccine (TDAP)  -     varicella zoster vacine live (ZOSTAVAX) 19,400 unit/0.65 mL susr injection; 1 Vial by SubCUTAneous route once for 1 dose. 2. Screen for colon cancer  -     OCCULT BLOOD, IMMUNOASSAY (FIT) (59887)    3. Screening for depression  -     CBC W/O DIFF  -     AMB POC URINALYSIS DIP STICK AUTO W/O MICRO  -     TSH 3RD GENERATION  -     METABOLIC PANEL, COMPREHENSIVE  -     Depression Screen Annual  -     LIPID PANEL    4. Screening for alcoholism  -     CBC W/O DIFF  -     AMB POC URINALYSIS DIP STICK AUTO W/O MICRO  -     TSH 3RD GENERATION  -     METABOLIC PANEL, COMPREHENSIVE  -     Annual  Alcohol Screen 15 min ()  -     LIPID PANEL    5.  Encounter for screening mammogram for malignant neoplasm of breast  -     CBC W/O DIFF  -     AMB POC URINALYSIS DIP STICK AUTO W/O MICRO  -     TSH 3RD GENERATION  -     METABOLIC PANEL, COMPREHENSIVE  -     Influenza Admin ()  -     Tetanus, Diphtheria Toxoids and Acellular Pertussis Vaccine (TDAP)  -     LIPID PANEL    6. Disorder of bone and cartilage, unspecified  -     CBC W/O DIFF  -     AMB POC URINALYSIS DIP STICK AUTO W/O MICRO  -     TSH 3RD GENERATION  -     METABOLIC PANEL, COMPREHENSIVE  -     Dexa Bone Density Study Axial (MIQ9663); Future  -     LIPID PANEL    7. Osteopenia, unspecified location  -     CBC W/O DIFF  -     AMB POC URINALYSIS DIP STICK AUTO W/O MICRO  -     TSH 3RD GENERATION  -     METABOLIC PANEL, COMPREHENSIVE  -     LIPID PANEL    8.  Prediabetes  -     CBC W/O DIFF  -     AMB POC URINALYSIS DIP STICK AUTO W/O MICRO  -     TSH 3RD GENERATION  -     METABOLIC PANEL, COMPREHENSIVE  -     LIPID PANEL  -     HEMOGLOBIN A1C WITH EAG         Health Maintenance Due   Topic Date Due    DTaP/Tdap/Td series (1 - Tdap) 03/05/1970    ZOSTER VACCINE AGE 60>  01/05/2009    GLAUCOMA SCREENING Q2Y  03/05/2014    Pneumococcal 65+ Low/Medium Risk (2 of 2 - PCV13) 11/07/2016    MEDICARE YEARLY EXAM  03/08/2017    INFLUENZA AGE 9 TO ADULT  08/01/2017   At this time patient was told to lose weight, so that her body mass index would get into a normal level between 20-25,  increase physical activity, limit alcohol consumption, stop secondhand tobacco exposure    In addition the patient was told to start an active life style modifications, for which includes creating a an interesting delightful to do list,  such as start of a light physical activity with a brisk daily walking 30 minutes most days of the week, most likely to total of 150 minutes per week, then the patient was told to try to avoid fatty fast foods, have a low-fat low-cholesterol diet, include seafood such as adding fatty fish such as [place], [place], [place] to the diet, increase vegetables and fruits, nuts 3-4 times per week and finally have a low-salt and K rich food intake for a good 4-6 months possibly for ever for the best outcome,   All mentioned recommendations, have to be done at least most days of the weeks for the best result,  Routine labs ordered, and the needed abnormal labs will be discussed soon and they can be repeated in 3-6 months. In addition relevant handouts were given to the patient for a better understanding,    patient was told to call if any problems.   Patient acknowledged understanding and     Patient agreed with today's recommendation

## 2017-09-28 LAB
ALBUMIN SERPL-MCNC: 4.2 G/DL (ref 3.6–4.8)
ALBUMIN/GLOB SERPL: 1.4 {RATIO} (ref 1.2–2.2)
ALP SERPL-CCNC: 72 IU/L (ref 39–117)
ALT SERPL-CCNC: 11 IU/L (ref 0–32)
AST SERPL-CCNC: 17 IU/L (ref 0–40)
BILIRUB SERPL-MCNC: 0.3 MG/DL (ref 0–1.2)
BUN SERPL-MCNC: 12 MG/DL (ref 8–27)
BUN/CREAT SERPL: 21 (ref 12–28)
CALCIUM SERPL-MCNC: 8.9 MG/DL (ref 8.7–10.3)
CHLORIDE SERPL-SCNC: 101 MMOL/L (ref 96–106)
CHOLEST SERPL-MCNC: 189 MG/DL (ref 100–199)
CO2 SERPL-SCNC: 28 MMOL/L (ref 18–29)
CREAT SERPL-MCNC: 0.56 MG/DL (ref 0.57–1)
ERYTHROCYTE [DISTWIDTH] IN BLOOD BY AUTOMATED COUNT: 15.9 % (ref 12.3–15.4)
EST. AVERAGE GLUCOSE BLD GHB EST-MCNC: 123 MG/DL
GLOBULIN SER CALC-MCNC: 3.1 G/DL (ref 1.5–4.5)
GLUCOSE SERPL-MCNC: 86 MG/DL (ref 65–99)
HBA1C MFR BLD: 5.9 % (ref 4.8–5.6)
HCT VFR BLD AUTO: 38 % (ref 34–46.6)
HDLC SERPL-MCNC: 53 MG/DL
HGB BLD-MCNC: 12.4 G/DL (ref 11.1–15.9)
LDLC SERPL CALC-MCNC: 115 MG/DL (ref 0–99)
MCH RBC QN AUTO: 25.4 PG (ref 26.6–33)
MCHC RBC AUTO-ENTMCNC: 32.6 G/DL (ref 31.5–35.7)
MCV RBC AUTO: 78 FL (ref 79–97)
PLATELET # BLD AUTO: 321 X10E3/UL (ref 150–379)
POTASSIUM SERPL-SCNC: 4.1 MMOL/L (ref 3.5–5.2)
PROT SERPL-MCNC: 7.3 G/DL (ref 6–8.5)
RBC # BLD AUTO: 4.88 X10E6/UL (ref 3.77–5.28)
SODIUM SERPL-SCNC: 141 MMOL/L (ref 134–144)
TRIGL SERPL-MCNC: 105 MG/DL (ref 0–149)
TSH SERPL DL<=0.005 MIU/L-ACNC: 3.13 UIU/ML (ref 0.45–4.5)
VLDLC SERPL CALC-MCNC: 21 MG/DL (ref 5–40)
WBC # BLD AUTO: 8.1 X10E3/UL (ref 3.4–10.8)

## 2017-10-17 RX ORDER — VENLAFAXINE 75 MG/1
TABLET ORAL
Qty: 30 TAB | Refills: 7 | Status: SHIPPED | OUTPATIENT
Start: 2017-10-17 | End: 2018-04-02 | Stop reason: SDUPTHER

## 2017-10-31 DIAGNOSIS — E55.9 VITAMIN D DEFICIENCY: ICD-10-CM

## 2017-10-31 NOTE — TELEPHONE ENCOUNTER
----- Message from Nivia Mckinney sent at 10/31/2017  3:29 PM EDT -----  Regarding: Dr. Javier Lafleur telephone  Contact: [phone]  Pt is requesting a call back regarding the status of refill on calcium med. Pt pharmacy [facility] submitted the request Sunday but no response back.  Pt's best contact number is [phone]

## 2017-11-01 RX ORDER — MULTIVITAMIN
1 TABLET ORAL DAILY
Qty: 30 TAB | Refills: 0 | Status: SHIPPED | OUTPATIENT
Start: 2017-11-01 | End: 2022-07-06

## 2017-11-15 RX ORDER — LORATADINE 10 MG/1
TABLET ORAL
Qty: 30 TAB | Refills: 6 | Status: SHIPPED | OUTPATIENT
Start: 2017-11-15 | End: 2018-08-13 | Stop reason: ALTCHOICE

## 2017-11-26 DIAGNOSIS — B37.9 YEAST INFECTION: ICD-10-CM

## 2017-11-27 RX ORDER — HYDROCHLOROTHIAZIDE 25 MG/1
TABLET ORAL
Qty: 90 TAB | Refills: 0 | Status: SHIPPED | OUTPATIENT
Start: 2017-11-27 | End: 2018-03-06 | Stop reason: SDUPTHER

## 2017-11-27 RX ORDER — SIMVASTATIN 10 MG/1
TABLET, FILM COATED ORAL
Qty: 90 TAB | Refills: 0 | Status: SHIPPED | OUTPATIENT
Start: 2017-11-27 | End: 2018-03-06 | Stop reason: SDUPTHER

## 2018-03-06 DIAGNOSIS — B37.9 YEAST INFECTION: ICD-10-CM

## 2018-03-06 RX ORDER — HYDROCHLOROTHIAZIDE 25 MG/1
TABLET ORAL
Qty: 30 TAB | Refills: 0 | Status: SHIPPED | OUTPATIENT
Start: 2018-03-06 | End: 2018-04-02 | Stop reason: SDUPTHER

## 2018-03-06 RX ORDER — SIMVASTATIN 10 MG/1
TABLET, FILM COATED ORAL
Qty: 30 TAB | Refills: 0 | Status: SHIPPED | OUTPATIENT
Start: 2018-03-06 | End: 2018-04-02 | Stop reason: SDUPTHER

## 2018-04-02 ENCOUNTER — OFFICE VISIT (OUTPATIENT)
Dept: FAMILY MEDICINE CLINIC | Age: 68
End: 2018-04-02

## 2018-04-02 VITALS
BODY MASS INDEX: 27.66 KG/M2 | SYSTOLIC BLOOD PRESSURE: 133 MMHG | OXYGEN SATURATION: 96 % | HEIGHT: 64 IN | RESPIRATION RATE: 16 BRPM | HEART RATE: 82 BPM | DIASTOLIC BLOOD PRESSURE: 75 MMHG | WEIGHT: 162 LBS | TEMPERATURE: 97.4 F

## 2018-04-02 DIAGNOSIS — G47.00 INSOMNIA, UNSPECIFIED TYPE: ICD-10-CM

## 2018-04-02 DIAGNOSIS — R73.03 PREDIABETES: ICD-10-CM

## 2018-04-02 DIAGNOSIS — I10 ESSENTIAL HYPERTENSION: Primary | ICD-10-CM

## 2018-04-02 DIAGNOSIS — N95.1 HOT FLASHES, MENOPAUSAL: ICD-10-CM

## 2018-04-02 DIAGNOSIS — E78.00 HYPERCHOLESTEROLEMIA: ICD-10-CM

## 2018-04-02 LAB — HBA1C MFR BLD HPLC: 5.7 %

## 2018-04-02 RX ORDER — SIMVASTATIN 10 MG/1
TABLET, FILM COATED ORAL
Qty: 90 TAB | Refills: 3 | Status: SHIPPED | OUTPATIENT
Start: 2018-04-02 | End: 2019-05-16 | Stop reason: SDUPTHER

## 2018-04-02 RX ORDER — VENLAFAXINE 75 MG/1
TABLET ORAL
Qty: 90 TAB | Refills: 3 | Status: SHIPPED | OUTPATIENT
Start: 2018-04-02 | End: 2019-04-24 | Stop reason: SDUPTHER

## 2018-04-02 RX ORDER — HYDROCHLOROTHIAZIDE 25 MG/1
TABLET ORAL
Qty: 90 TAB | Refills: 3 | Status: SHIPPED | OUTPATIENT
Start: 2018-04-02 | End: 2019-03-31 | Stop reason: SDUPTHER

## 2018-04-02 NOTE — PROGRESS NOTES
HISTORY OF PRESENT ILLNESS  Preethi Sexton is a 70 y.o. female. HPI   postmsneo  Taking half of the effexor working well for her, no spotting no hot flashes, appetite ok wt up only 2 pounds, the pat has not been feeling anxious, and  Has not been feeling stressed out,    HTN  Today pt present for Bp check and the patient stating that so far there has been a Compliancy w/ the bp meds, she is having had the low salt diet, the patient has been active, she does do the daily walking,  pt states that manien does obtain the bp at home few times a week and the average of ,130/70 today the pt denies Chest Pain, has no legs swelling no lightheadedness,  Hypercholestremia  Patient has no personal no family history of  early heart disease with the patient's parents and siblings, aware of patient's last lipoprotein profile, blood sugar, TSH, liver and renal function tests,  the patient eats out but not more than average,   and currently, there is no muscle nor abdominal pain, And patient fasting today, the patient is compliant w/ meds, in addition to the intake of daily baby aspirin     otherwise feeling better since the last visit    Current Outpatient Prescriptions   Medication Sig Dispense Refill    simvastatin (ZOCOR) 10 mg tablet TAKE ONE TABLET BY MOUTH EVERY NIGHT 30 Tab 0    hydroCHLOROthiazide (HYDRODIURIL) 25 mg tablet TAKE ONE TABLET BY MOUTH DAILY 30 Tab 0    loratadine (CLARITIN) 10 mg tablet TAKE ONE TABLET BY MOUTH DAILY 30 Tab 6    calcium-cholecalciferol, D3, (CALTRATE 600+D) tablet Take 1 Tab by mouth daily. 30 Tab 0    venlafaxine (EFFEXOR) 75 mg tablet TAKE ONE TABLET BY MOUTH DAILY 30 Tab 7    albuterol (PROVENTIL HFA, VENTOLIN HFA, PROAIR HFA) 90 mcg/actuation inhaler Take 2 Puffs by inhalation every four (4) hours as needed for Wheezing. 1 Inhaler 0    guaiFENesin (ORGANIDIN) 400 mg tablet Take 1 Tab by mouth every six (6) hours as needed for Congestion.  15 Tab 0    camphor-menthol [facility] ANTI-ITCH) 0.5-0.5 % lotion Apply to affected areas 1-2 times a day as need for itching 1 Bottle 0    cetirizine (ZYRTEC) 10 mg tablet Take 1 Tab by mouth daily. 20 Tab 0    fluconazole (DIFLUCAN) 150 mg tablet One tablet every 3 days till finished 3 Tab 0    nystatin (MYCOSTATIN) topical cream Apply  to affected area two (2) times a day. 30 g 1    chlorpheniramine (CHLOR-TRIMETRON) 4 mg tablet Take 2 Tabs by mouth every six (6) hours as needed for Allergies or Rhinitis. Indications: COUGH, NASAL CONGESTION, SNEEZING, VASOMOTOR RHINITIS 60 Tab 1    carisoprodol (SOMA) 350 mg tablet Take 1 Tab by mouth two (2) times daily as needed for Muscle Spasm(s). Max Daily Amount: 700 mg. 15 Tab 0    aspirin 81 mg chewable tablet Take 1 Tab by mouth daily.  30 Tab 11     No Known Allergies  Past Medical History:   Diagnosis Date    Contact dermatitis and other eczema, due to unspecified cause     Hot flashes, menopausal 4/21/2010    HTN (hypertension) 3/29/2010    Insomnia 3/29/2010    Knee pain, right 6/4/2015    Osteopenia 4/21/2010    Prediabetes 6/8/2012    Recurrent sinusitis 3/7/2016    Screening for osteoporosis 3/7/2016     Past Surgical History:   Procedure Laterality Date    ENDOSCOPY, COLON, DIAGNOSTIC      in 2009    HX BREAST BIOPSY Left 1970    benign:  no visible scar    HX HYSTERECTOMY       Family History   Problem Relation Age of Onset   [address] [facility] Stroke Mother     Hypertension Sister     Hypertension Brother     Hypertension Sister     Hypertension Brother      Social History   Substance Use Topics    Smoking status: Never Smoker    Smokeless tobacco: Never Used      Comment: quit 30 yrs ago    Alcohol use No      Comment: occation      Lab Results  Component Value Date/Time   WBC 8.1 09/27/2017 12:44 PM   HGB 12.4 09/27/2017 12:44 PM   HCT 38.0 09/27/2017 12:44 PM   PLATELET 115 62/99/8301 12:44 PM   MCV 78 (L) 09/27/2017 12:44 PM     Lab Results  Component Value Date/Time   Hemoglobin A1c 5.9 (H) 09/27/2017 12:44 PM   Hemoglobin A1c 6.5 (H) 06/17/2014 11:25 AM   Hemoglobin A1c 5.7 (H) 07/03/2013 08:26 AM   Glucose 86 09/27/2017 12:44 PM   LDL, calculated 115 (H) 09/27/2017 12:44 PM   Creatinine 0.56 (L) 09/27/2017 12:44 PM      Lab Results  Component Value Date/Time   Cholesterol, total 189 09/27/2017 12:44 PM   HDL Cholesterol 53 09/27/2017 12:44 PM   LDL, calculated 115 (H) 09/27/2017 12:44 PM   Triglyceride 105 09/27/2017 12:44 PM     Lab Results  Component Value Date/Time   ALT (SGPT) 11 09/27/2017 12:44 PM   AST (SGOT) 17 09/27/2017 12:44 PM   Alk. phosphatase 72 09/27/2017 12:44 PM   Bilirubin, total 0.3 09/27/2017 12:44 PM   Albumin 4.2 09/27/2017 12:44 PM   Protein, total 7.3 09/27/2017 12:44 PM   PLATELET 121 81/01/2410 12:44 PM       Lab Results  Component Value Date/Time   GFR est non-AA 96 09/27/2017 12:44 PM   GFR est  09/27/2017 12:44 PM   Creatinine 0.56 (L) 09/27/2017 12:44 PM   BUN 12 09/27/2017 12:44 PM   Sodium 141 09/27/2017 12:44 PM   Potassium 4.1 09/27/2017 12:44 PM   Chloride 101 09/27/2017 12:44 PM   CO2 28 09/27/2017 12:44 PM        Review of Systems   Constitutional: Negative for chills, fever and malaise/fatigue. HENT: Negative for congestion and nosebleeds. Eyes: Negative for blurred vision and pain. Respiratory: Negative for shortness of breath and wheezing. Cardiovascular: Negative for chest pain, palpitations and leg swelling. Gastrointestinal: Negative for constipation, diarrhea, nausea and vomiting. Genitourinary: Negative for dysuria and frequency. Musculoskeletal: Negative for joint pain and myalgias. Skin: Negative for itching and rash. Neurological: Negative for dizziness, loss of consciousness and headaches. Endo/Heme/Allergies: Does not bruise/bleed easily. Psychiatric/Behavioral: Negative for depression. The patient is not nervous/anxious and does not have insomnia. All other systems reviewed and are negative.       Physical Exam   Constitutional: She is oriented to person, place, and time. She appears well-developed and well-nourished. HENT:   Head: Normocephalic and atraumatic. Eyes: Conjunctivae and EOM are normal. Pupils are equal, round, and reactive to light. Neck: No JVD present. No thyromegaly present. Cardiovascular: Normal rate, regular rhythm, normal heart sounds and intact distal pulses. Exam reveals no gallop and no friction rub. No murmur heard. Pulmonary/Chest: Effort normal and breath sounds normal. No stridor. No respiratory distress. She has no wheezes. She has no rales. Abdominal: Soft. Bowel sounds are normal. She exhibits no distension and no mass. There is no tenderness. Musculoskeletal: Normal range of motion. She exhibits no edema or tenderness. Lymphadenopathy:     She has no cervical adenopathy. Neurological: She is alert and oriented to person, place, and time. She has normal reflexes. No cranial nerve deficit. Skin: No rash noted. No erythema. Psychiatric: She has a normal mood and affect. Her behavior is normal.   Nursing note and vitals reviewed. ASSESSMENT and PLAN  Diagnoses and all orders for this visit:    1. Essential hypertension  -     venlafaxine (EFFEXOR) 75 mg tablet; TAKE ONE TABLET BY MOUTH DAILY  -     simvastatin (ZOCOR) 10 mg tablet; TAKE ONE TABLET BY MOUTH EVERY NIGHT  -     hydroCHLOROthiazide (HYDRODIURIL) 25 mg tablet; TAKE ONE TABLET BY MOUTH DAILY  -     CBC W/O DIFF  -     METABOLIC PANEL, COMPREHENSIVE  -     LIPID PANEL  -     TSH 3RD GENERATION  -     AMB POC HEMOGLOBIN A1C    2.  Hot flashes, menopausal  -     venlafaxine (EFFEXOR) 75 mg tablet; TAKE ONE TABLET BY MOUTH DAILY  -     simvastatin (ZOCOR) 10 mg tablet; TAKE ONE TABLET BY MOUTH EVERY NIGHT  -     hydroCHLOROthiazide (HYDRODIURIL) 25 mg tablet; TAKE ONE TABLET BY MOUTH DAILY  -     CBC W/O DIFF  -     METABOLIC PANEL, COMPREHENSIVE  -     LIPID PANEL  -     TSH 3RD GENERATION  -     AMB POC HEMOGLOBIN A1C    3. Insomnia, unspecified type  -     venlafaxine (EFFEXOR) 75 mg tablet; TAKE ONE TABLET BY MOUTH DAILY  -     simvastatin (ZOCOR) 10 mg tablet; TAKE ONE TABLET BY MOUTH EVERY NIGHT  -     hydroCHLOROthiazide (HYDRODIURIL) 25 mg tablet; TAKE ONE TABLET BY MOUTH DAILY  -     CBC W/O DIFF  -     METABOLIC PANEL, COMPREHENSIVE  -     LIPID PANEL  -     TSH 3RD GENERATION  -     AMB POC HEMOGLOBIN A1C    4. Hypercholesterolemia  -     venlafaxine (EFFEXOR) 75 mg tablet; TAKE ONE TABLET BY MOUTH DAILY  -     simvastatin (ZOCOR) 10 mg tablet; TAKE ONE TABLET BY MOUTH EVERY NIGHT  -     hydroCHLOROthiazide (HYDRODIURIL) 25 mg tablet; TAKE ONE TABLET BY MOUTH DAILY  -     CBC W/O DIFF  -     METABOLIC PANEL, COMPREHENSIVE  -     LIPID PANEL  -     TSH 3RD GENERATION  -     AMB POC HEMOGLOBIN A1C    5.  Prediabetes  -     venlafaxine (EFFEXOR) 75 mg tablet; TAKE ONE TABLET BY MOUTH DAILY  -     simvastatin (ZOCOR) 10 mg tablet; TAKE ONE TABLET BY MOUTH EVERY NIGHT  -     hydroCHLOROthiazide (HYDRODIURIL) 25 mg tablet; TAKE ONE TABLET BY MOUTH DAILY  -     CBC W/O DIFF  -     METABOLIC PANEL, COMPREHENSIVE  -     LIPID PANEL  -     TSH 3RD GENERATION  -     AMB POC HEMOGLOBIN A1C

## 2018-04-02 NOTE — PROGRESS NOTES
Bronwyn Kent is a 70 y.o. female    Chief Complaint   Patient presents with    Hypertension     follow up     1. Have you been to the ER, urgent care clinic since your last visit? Hospitalized since your last visit? No    2. Have you seen or consulted any other health care providers outside of the [facility] since your last visit? Include any pap smears or colon screening.  No     Health Maintenance Due   Topic Date Due    DTaP/Tdap/Td series (1 - Tdap) 03/05/1970    ZOSTER VACCINE AGE 60>  01/05/2009    GLAUCOMA SCREENING Q2Y  03/05/2014    Pneumococcal 65+ Low/Medium Risk (2 of 2 - PCV13) 11/07/2016     Patient states her last eye exam was last year by [facility]

## 2018-04-02 NOTE — MR AVS SNAPSHOT
[address] [facility] [address] [address] 
[phone] Patient: Yanni Tapia MRN: [id number] DIW:5/7/[birth year]25 Visit Information Date & Time Provider Department Dept. Phone Encounter #  
 4/2/2018  9:30 AM Serena Osler, MD [facility] OFFICE-ANNEX [phone] [id number] Upcoming Health Maintenance Date Due DTaP/Tdap/Td series (1 - Tdap) 3/5/1970 ZOSTER VACCINE AGE 60> 1/5/2009 GLAUCOMA SCREENING Q2Y 3/5/2014 Pneumococcal 65+ Low/Medium Risk (2 of 2 - PCV13) 11/7/2016 MEDICARE YEARLY EXAM 9/28/2018 BREAST CANCER SCRN MAMMOGRAM 7/20/2019 COLONOSCOPY 3/30/2020 Allergies as of 4/2/2018  Review Complete On: 4/2/2018 By: Khushi Marino LPN No Known Allergies Current Immunizations  Reviewed on 9/27/2017 Name Date Influenza High Dose Vaccine PF 9/27/2017, 9/15/2016, 11/7/2015 Influenza Vaccine 11/17/2013 Influenza Vaccine Split 11/14/2012, 10/10/2011 Pneumococcal Conjugate (PCV-13)  Deferred (Patient Refused) Pneumococcal Polysaccharide (PPSV-23) 11/7/2015 Tdap  Deferred (Patient Refused),  Deferred (Patient Refused) Not reviewed this visit You Were Diagnosed With   
  
 Codes Comments Essential hypertension    -  Primary ICD-10-CM: I10 
ICD-9-CM: 401.9 Hot flashes, menopausal     ICD-10-CM: N95.1 ICD-9-CM: 627.2 Insomnia, unspecified type     ICD-10-CM: G47.00 ICD-9-CM: 780.52 Hypercholesterolemia     ICD-10-CM: E78.00 ICD-9-CM: 272.0 Prediabetes     ICD-10-CM: R73.03 
ICD-9-CM: 790.29 Vitals BP Pulse Temp Resp Height(growth percentile) Weight(growth percentile) 133/75 (BP 1 Location: Right arm, BP Patient Position: Sitting) 82 97.4 °F (36.3 °C) (Oral) 16 5' 3.5\" (1.613 m) 162 lb (73.5 kg) SpO2 BMI OB Status Smoking Status 96% 28.25 kg/m2 Hysterectomy Never Smoker Vitals History BMI and BSA Data Body Mass Index Body Surface Area  
 28.25 kg/m 2 1.81 m 2 Preferred Pharmacy Pharmacy Name Phone [facility] [address] [facility], [address] [place] [address] [phone] Your Updated Medication List  
  
   
This list is accurate as of 4/2/18  9:54 AM.  Always use your most recent med list.  
  
  
  
  
 albuterol 90 mcg/actuation inhaler Commonly known as:  PROVENTIL HFA, VENTOLIN HFA, PROAIR HFA Take 2 Puffs by inhalation every four (4) hours as needed for Wheezing. aspirin 81 mg chewable tablet Take 1 Tab by mouth daily. calcium-cholecalciferol (D3) tablet Commonly known as:  CALTRATE 600+D Take 1 Tab by mouth daily. camphor-menthol 0.5-0.5 % lotion Commonly known as:  SARNA ANTI-ITCH Apply to affected areas 1-2 times a day as need for itching  
  
 carisoprodol 350 mg tablet Commonly known as:  SOMA Take 1 Tab by mouth two (2) times daily as needed for Muscle Spasm(s). Max Daily Amount: 700 mg.  
  
 cetirizine 10 mg tablet Commonly known as:  ZyrTEC Take 1 Tab by mouth daily. chlorpheniramine 4 mg tablet Commonly known as:  CHLOR-TRIMETRON Take 2 Tabs by mouth every six (6) hours as needed for Allergies or Rhinitis. Indications: COUGH, NASAL CONGESTION, SNEEZING, VASOMOTOR RHINITIS  
  
 fluconazole 150 mg tablet Commonly known as:  DIFLUCAN One tablet every 3 days till finished  
  
 guaiFENesin 400 mg tablet Commonly known as:  Jania Stern Take 1 Tab by mouth every six (6) hours as needed for Congestion. hydroCHLOROthiazide 25 mg tablet Commonly known as:  HYDRODIURIL  
TAKE ONE TABLET BY MOUTH DAILY  
  
 loratadine 10 mg tablet Commonly known as:  CLARITIN  
TAKE ONE TABLET BY MOUTH DAILY  
  
 nystatin topical cream  
Commonly known as:  MYCOSTATIN Apply  to affected area two (2) times a day. simvastatin 10 mg tablet Commonly known as:  ZOCOR  
TAKE ONE TABLET BY MOUTH EVERY NIGHT venlafaxine 75 mg tablet Commonly known as:  EFFEXOR  
TAKE ONE TABLET BY MOUTH DAILY Prescriptions Sent to Pharmacy Refills  
 venlafaxine (EFFEXOR) 75 mg tablet 3 Sig: TAKE ONE TABLET BY MOUTH DAILY Class: Normal  
 Pharmacy: [address] Ph #: [phone]  
 simvastatin (ZOCOR) 10 mg tablet 3 Sig: TAKE ONE TABLET BY MOUTH EVERY NIGHT Class: Normal  
 Pharmacy: [address] Ph #: [phone]  
 hydroCHLOROthiazide (HYDRODIURIL) 25 mg tablet 3 Sig: TAKE ONE TABLET BY MOUTH DAILY Class: Normal  
 Pharmacy: [address] Ph #: [phone] We Performed the Following AMB POC HEMOGLOBIN A1C [71557 CPT(R)] CBC W/O DIFF [72371 CPT(R)] LIPID PANEL [14745 CPT(R)] METABOLIC PANEL, COMPREHENSIVE [13272 CPT(R)] TSH 3RD GENERATION [29381 CPT(R)] Introducing [facility]! Issac Costa introduces Field[employer] patient portal. Now you can access parts of your medical record, email your doctor's office, and request medication refills online. 1. In your internet browser, go to [email or web address]://[employer]. [employer] 2. Click on the First Time User? Click Here link in the Sign In box. You will see the New Member Sign Up page. 3. Enter your Field[employer] Access Code exactly as it appears below. You will not need to use this code after youve completed the sign-up process. If you do not sign up before the expiration date, you must request a new code. · Field[employer] Access Code: [email or web address] Expires: 7/1/2018  9:54 AM 
 
4. Enter the last four digits of your Social Security Number (xxxx) and Date of Birth (mm/dd/yyyy) as indicated and click Submit. You will be taken to the next sign-up page. 5. Create a FieldE[employer] ID. This will be your Field[employer] login ID and cannot be changed, so think of one that is secure and easy to remember. 6. Create a [employer] password. You can change your password at any time. 7. Enter your Password Reset Question and Answer. This can be used at a later time if you forget your password. 8. Enter your e-mail address. You will receive e-mail notification when new information is available in [address] 9. Click Sign Up. You can now view and download portions of your medical record. 10. Click the Download Summary menu link to download a portable copy of your medical information. If you have questions, please visit the Frequently Asked Questions section of the [employer] website. Remember, [employer] is NOT to be used for urgent needs. For medical emergencies, dial 911. Now available from your iPhone and Android! Please provide this summary of care documentation to your next provider. Your primary care clinician is listed as Olya Ho. If you have any questions after today's visit, please call [phone].

## 2018-04-03 LAB
ALBUMIN SERPL-MCNC: 4 G/DL (ref 3.6–4.8)
ALBUMIN/GLOB SERPL: 1.3 {RATIO} (ref 1.2–2.2)
ALP SERPL-CCNC: 77 IU/L (ref 39–117)
ALT SERPL-CCNC: 14 IU/L (ref 0–32)
AST SERPL-CCNC: 18 IU/L (ref 0–40)
BILIRUB SERPL-MCNC: 0.3 MG/DL (ref 0–1.2)
BUN SERPL-MCNC: 14 MG/DL (ref 8–27)
BUN/CREAT SERPL: 20 (ref 12–28)
CALCIUM SERPL-MCNC: 8.9 MG/DL (ref 8.7–10.3)
CHLORIDE SERPL-SCNC: 101 MMOL/L (ref 96–106)
CHOLEST SERPL-MCNC: 179 MG/DL (ref 100–199)
CO2 SERPL-SCNC: 27 MMOL/L (ref 18–29)
CREAT SERPL-MCNC: 0.7 MG/DL (ref 0.57–1)
ERYTHROCYTE [DISTWIDTH] IN BLOOD BY AUTOMATED COUNT: 15.8 % (ref 12.3–15.4)
GFR SERPLBLD CREATININE-BSD FMLA CKD-EPI: 102 ML/MIN/1.73
GFR SERPLBLD CREATININE-BSD FMLA CKD-EPI: 89 ML/MIN/1.73
GLOBULIN SER CALC-MCNC: 3 G/DL (ref 1.5–4.5)
GLUCOSE SERPL-MCNC: 113 MG/DL (ref 65–99)
HCT VFR BLD AUTO: 40.2 % (ref 34–46.6)
HDLC SERPL-MCNC: 49 MG/DL
HGB BLD-MCNC: 13 G/DL (ref 11.1–15.9)
LDLC SERPL CALC-MCNC: 101 MG/DL (ref 0–99)
MCH RBC QN AUTO: 25.9 PG (ref 26.6–33)
MCHC RBC AUTO-ENTMCNC: 32.3 G/DL (ref 31.5–35.7)
MCV RBC AUTO: 80 FL (ref 79–97)
PLATELET # BLD AUTO: 301 X10E3/UL (ref 150–379)
POTASSIUM SERPL-SCNC: 4 MMOL/L (ref 3.5–5.2)
PROT SERPL-MCNC: 7 G/DL (ref 6–8.5)
RBC # BLD AUTO: 5.02 X10E6/UL (ref 3.77–5.28)
SODIUM SERPL-SCNC: 142 MMOL/L (ref 134–144)
TRIGL SERPL-MCNC: 145 MG/DL (ref 0–149)
TSH SERPL DL<=0.005 MIU/L-ACNC: 2.73 UIU/ML (ref 0.45–4.5)
VLDLC SERPL CALC-MCNC: 29 MG/DL (ref 5–40)
WBC # BLD AUTO: 9.4 X10E3/UL (ref 3.4–10.8)

## 2018-07-24 ENCOUNTER — HOSPITAL ENCOUNTER (OUTPATIENT)
Dept: MAMMOGRAPHY | Age: 68
Discharge: HOME | End: 2018-07-24
Attending: FAMILY MEDICINE
Payer: MEDICARE

## 2018-07-24 DIAGNOSIS — Z12.31 VISIT FOR SCREENING MAMMOGRAM: ICD-10-CM

## 2018-07-24 PROCEDURE — 77067 SCR MAMMO BI INCL CAD: CPT

## 2018-08-13 ENCOUNTER — OFFICE VISIT (OUTPATIENT)
Dept: FAMILY MEDICINE CLINIC | Age: 68
End: 2018-08-13

## 2018-08-13 VITALS
BODY MASS INDEX: 27.11 KG/M2 | DIASTOLIC BLOOD PRESSURE: 73 MMHG | WEIGHT: 158.8 LBS | HEART RATE: 75 BPM | SYSTOLIC BLOOD PRESSURE: 139 MMHG | TEMPERATURE: 96.9 F | RESPIRATION RATE: 16 BRPM | OXYGEN SATURATION: 96 % | HEIGHT: 64 IN

## 2018-08-13 DIAGNOSIS — R82.81 PYURIA: ICD-10-CM

## 2018-08-13 DIAGNOSIS — R35.0 URINE FREQUENCY: Primary | ICD-10-CM

## 2018-08-13 LAB
BILIRUB UR QL STRIP: NEGATIVE
GLUCOSE UR-MCNC: NEGATIVE MG/DL
KETONES P FAST UR STRIP-MCNC: NORMAL MG/DL
PH UR STRIP: 5.5 [PH] (ref 4.6–8)
PROT UR QL STRIP: NEGATIVE
SP GR UR STRIP: 1.02 (ref 1–1.03)
UA UROBILINOGEN AMB POC: NORMAL (ref 0.2–1)
URINALYSIS CLARITY POC: NORMAL
URINALYSIS COLOR POC: YELLOW
URINE BLOOD POC: NORMAL
URINE LEUKOCYTES POC: NORMAL
URINE NITRITES POC: NEGATIVE

## 2018-08-13 RX ORDER — CIPROFLOXACIN 500 MG/1
500 TABLET ORAL 2 TIMES DAILY
Qty: 14 TAB | Refills: 0 | Status: SHIPPED | OUTPATIENT
Start: 2018-08-13 | End: 2018-08-20

## 2018-08-13 RX ORDER — PHENAZOPYRIDINE HYDROCHLORIDE 200 MG/1
200 TABLET, FILM COATED ORAL
Qty: 9 TAB | Refills: 0 | Status: SHIPPED | OUTPATIENT
Start: 2018-08-13 | End: 2018-08-16

## 2018-08-13 NOTE — PROGRESS NOTES
Name and  verified      Chief Complaint   Patient presents with    Urinary Pain     X two weeks         Health Maintenance reviewed-discussed with patient. 1. Have you been to the ER, urgent care clinic since your last visit? Hospitalized since your last visit? No    2. Have you seen or consulted any other health care providers outside of the [address] [place] [address] since your last visit? Include any pap smears or colon screening.  No

## 2018-08-13 NOTE — PROGRESS NOTES
HISTORY OF PRESENT ILLNESS  Henrik Grover is a 70 y.o. female. HPI     Urinary Frequency   The history is provided by the patient. This is a new problem. The current episode started more than 1 week ago. The problem occurs every urination. The problem has been gradually worsening. The quality of the pain is described as burning. The pain is at a severity of 2/10. There has been no fever. is not sexually active. There is no history of pyelonephritis. Associated symptoms include frequency, hesitancy and urgency. Pertinent negatives include no chills, no sweats, no nausea, no vomiting, no discharge, ++ flank pain, , no abdominal pain and no back pain. has not tried acetaminophen and NSAIDs for the symptoms. The treatment provided no relief. past medical history does not include kidney stones, single kidney, urological procedure, recurrent UTIs or urinary stasis. Current Outpatient Prescriptions   Medication Sig Dispense Refill    venlafaxine (EFFEXOR) 75 mg tablet TAKE ONE TABLET BY MOUTH DAILY 90 Tab 3    simvastatin (ZOCOR) 10 mg tablet TAKE ONE TABLET BY MOUTH EVERY NIGHT 90 Tab 3    hydroCHLOROthiazide (HYDRODIURIL) 25 mg tablet TAKE ONE TABLET BY MOUTH DAILY 90 Tab 3    loratadine (CLARITIN) 10 mg tablet TAKE ONE TABLET BY MOUTH DAILY 30 Tab 6    calcium-cholecalciferol, D3, (CALTRATE 600+D) tablet Take 1 Tab by mouth daily. 30 Tab 0    albuterol (PROVENTIL HFA, VENTOLIN HFA, PROAIR HFA) 90 mcg/actuation inhaler Take 2 Puffs by inhalation every four (4) hours as needed for Wheezing. 1 Inhaler 0    guaiFENesin (ORGANIDIN) 400 mg tablet Take 1 Tab by mouth every six (6) hours as needed for Congestion. 15 Tab 0    camphor-menthol (SARNA ANTI-ITCH) 0.5-0.5 % lotion Apply to affected areas 1-2 times a day as need for itching 1 Bottle 0    cetirizine (ZYRTEC) 10 mg tablet Take 1 Tab by mouth daily.  20 Tab 0    fluconazole (DIFLUCAN) 150 mg tablet One tablet every 3 days till finished 3 Tab 0    nystatin (MYCOSTATIN) topical cream Apply  to affected area two (2) times a day. 30 g 1    chlorpheniramine (CHLOR-TRIMETRON) 4 mg tablet Take 2 Tabs by mouth every six (6) hours as needed for Allergies or Rhinitis. Indications: COUGH, NASAL CONGESTION, SNEEZING, VASOMOTOR RHINITIS 60 Tab 1    carisoprodol (SOMA) 350 mg tablet Take 1 Tab by mouth two (2) times daily as needed for Muscle Spasm(s). Max Daily Amount: 700 mg. 15 Tab 0    aspirin 81 mg chewable tablet Take 1 Tab by mouth daily. 30 Tab 11     No Known Allergies  Past Medical History:   Diagnosis Date    Contact dermatitis and other eczema, due to unspecified cause     Hot flashes, menopausal 4/21/2010    HTN (hypertension) 3/29/2010    Insomnia 3/29/2010    Knee pain, right 6/4/2015    Osteopenia 4/21/2010    Prediabetes 6/8/2012    Recurrent sinusitis 3/7/2016    Screening for osteoporosis 3/7/2016     Past Surgical History:   Procedure Laterality Date    ENDOSCOPY, COLON, DIAGNOSTIC      in 2009    HX BREAST BIOPSY Left 1970    benign:  no visible scar    HX HYSTERECTOMY       Family History   Problem Relation Age of Onset    Stroke Mother     Hypertension Sister     Hypertension Brother     Hypertension Sister     Hypertension Brother      Social History   Substance Use Topics    Smoking status: Never Smoker    Smokeless tobacco: Never Used      Comment: quit 30 yrs ago    Alcohol use No      Comment: occation      Lab Results  Component Value Date/Time   WBC 9.4 04/02/2018 10:00 AM   HGB 13.0 04/02/2018 10:00 AM   HCT 40.2 04/02/2018 10:00 AM   PLATELET 652 16/83/7625 10:00 AM   MCV 80 04/02/2018 10:00 AM     Lab Results  Component Value Date/Time   TSH 2.730 04/02/2018 10:00 AM         Review of Systems   Constitutional: Negative for chills and fever. HENT: Negative for congestion and nosebleeds. Eyes: Negative for blurred vision and pain. Respiratory: Negative for cough, shortness of breath and wheezing. Cardiovascular: Negative for chest pain and leg swelling. Gastrointestinal: Negative for constipation, diarrhea, nausea and vomiting. Genitourinary: Positive for dysuria, flank pain and urgency. Negative for frequency. Musculoskeletal: Negative for joint pain and myalgias. Skin: Negative for itching and rash. Neurological: Negative for dizziness, loss of consciousness and headaches. Psychiatric/Behavioral: Negative for depression. The patient is not nervous/anxious and does not have insomnia. Physical Exam   Constitutional: She is oriented to person, place, and time. She appears well-developed and well-nourished. HENT:   Head: Normocephalic and atraumatic. Eyes: Conjunctivae and EOM are normal. Pupils are equal, round, and reactive to light. Neck: No JVD present. No thyromegaly present. Cardiovascular: Normal rate, regular rhythm, normal heart sounds and intact distal pulses. Exam reveals no gallop and no friction rub. No murmur heard. Pulmonary/Chest: Effort normal and breath sounds normal. No stridor. No respiratory distress. She has no wheezes. She has no rales. Abdominal: Soft. Bowel sounds are normal. She exhibits no distension and no mass. There is no tenderness. Musculoskeletal: Normal range of motion. She exhibits no edema or tenderness. Lymphadenopathy:     She has no cervical adenopathy. Neurological: She is alert and oriented to person, place, and time. She has normal reflexes. No cranial nerve deficit. Skin: No rash noted. No erythema. Psychiatric: She has a normal mood and affect. Her behavior is normal.   Nursing note and vitals reviewed. ASSESSMENT and PLAN  Diagnoses and all orders for this visit:    1. Urine frequency  -     AMB POC URINALYSIS DIP STICK AUTO W/O MICRO  -     CULTURE, URINE    2. Pyuria  -     AMB POC URINALYSIS DIP STICK AUTO W/O MICRO  -     CULTURE, URINE    Other orders  -     ciprofloxacin HCl (CIPRO) 500 mg tablet;  Take 1 Tab by mouth two (2) times a day for 7 days. -     phenazopyridine (PYRIDIUM) 200 mg tablet; Take 1 Tab by mouth three (3) times daily as needed for Pain for up to 3 days.  Indications: URINARY TRACT IRRITATION

## 2018-08-13 NOTE — PROGRESS NOTES
Name and  verified        Chief Complaint   Patient presents with    Urinary Pain         Health Maintenance reviewed-discussed with patient. 1. Have you been to the ER, urgent care clinic since your last visit? Hospitalized since your last visit? No    2. Have you seen or consulted any other health care providers outside of the [address] [place] [address] since your last visit? Include any pap smears or colon screening.  No

## 2018-08-13 NOTE — PATIENT INSTRUCTIONS
Urinary Tract Infection in Women: Care Instructions  Your Care Instructions    A urinary tract infection, or UTI, is a general term for an infection anywhere between the kidneys and the urethra (where urine comes out). Most UTIs are bladder infections. They often cause pain or burning when you urinate. UTIs are caused by bacteria and can be cured with antibiotics. Be sure to complete your treatment so that the infection goes away. Follow-up care is a key part of your treatment and safety. Be sure to make and go to all appointments, and call your doctor if you are having problems. It's also a good idea to know your test results and keep a list of the medicines you take. How can you care for yourself at home? · Take your antibiotics as directed. Do not stop taking them just because you feel better. You need to take the full course of antibiotics. · Drink extra water and other fluids for the next day or two. This may help wash out the bacteria that are causing the infection. (If you have kidney, heart, or liver disease and have to limit fluids, talk with your doctor before you increase your fluid intake.)  · Avoid drinks that are carbonated or have caffeine. They can irritate the bladder. · Urinate often. Try to empty your bladder each time. · To relieve pain, take a hot bath or lay a heating pad set on low over your lower belly or genital area. Never go to sleep with a heating pad in place. To prevent UTIs  · Drink plenty of water each day. This helps you urinate often, which clears bacteria from your system. (If you have kidney, heart, or liver disease and have to limit fluids, talk with your doctor before you increase your fluid intake.)  · Urinate when you need to. · Urinate right after you have sex. · Change sanitary pads often. · Avoid douches, bubble baths, feminine hygiene sprays, and other feminine hygiene products that have deodorants.   · After going to the bathroom, wipe from front to back.  When should you call for help? Call your doctor now or seek immediate medical care if:    · Symptoms such as fever, chills, nausea, or vomiting get worse or appear for the first time.     · You have new pain in your back just below your rib cage. This is called flank pain.     · There is new blood or pus in your urine.     · You have any problems with your antibiotic medicine.    Watch closely for changes in your health, and be sure to contact your doctor if:    · You are not getting better after taking an antibiotic for 2 days.     · Your symptoms go away but then come back. Where can you learn more? Go to http://gosia-ailyn.info/. Enter O124 in the search box to learn more about \"Urinary Tract Infection in Women: Care Instructions. \"  Current as of: May 12, 2017  Content Version: 11.7  © 3329-2523 [employer]. Care instructions adapted under license by [employer] (which disclaims liability or warranty for this information). If you have questions about a medical condition or this instruction, always ask your healthcare professional. [address] any warranty or liability for your use of this information. Body Mass Index: Care Instructions  Your Care Instructions    Body mass index (BMI) can help you see if your weight is raising your risk for health problems. It uses a formula to compare how much you weigh with how tall you are. · A BMI lower than 18.5 is considered underweight. · A BMI between 18.5 and 24.9 is considered healthy. · A BMI between 25 and 29.9 is considered overweight. A BMI of 30 or higher is considered obese. If your BMI is in the normal range, it means that you have a lower risk for weight-related health problems.  If your BMI is in the overweight or obese range, you may be at increased risk for weight-related health problems, such as high blood pressure, heart disease, stroke, arthritis or joint pain, and diabetes. If your BMI is in the underweight range, you may be at increased risk for health problems such as fatigue, lower protection (immunity) against illness, muscle loss, bone loss, hair loss, and hormone problems. BMI is just one measure of your risk for weight-related health problems. You may be at higher risk for health problems if you are not active, you eat an unhealthy diet, or you drink too much alcohol or use tobacco products. Follow-up care is a key part of your treatment and safety. Be sure to make and go to all appointments, and call your doctor if you are having problems. It's also a good idea to know your test results and keep a list of the medicines you take. How can you care for yourself at home? · Practice healthy eating habits. This includes eating plenty of fruits, vegetables, whole grains, lean protein, and low-fat dairy. · If your doctor recommends it, get more exercise. Walking is a good choice. Bit by bit, increase the amount you walk every day. Try for at least 30 minutes on most days of the week. · Do not smoke. Smoking can increase your risk for health problems. If you need help quitting, talk to your doctor about stop-smoking programs and medicines. These can increase your chances of quitting for good. · Limit alcohol to 2 drinks a day for men and 1 drink a day for women. Too much alcohol can cause health problems. If you have a BMI higher than 25  · Your doctor may do other tests to check your risk for weight-related health problems. This may include measuring the distance around your waist. A waist measurement of more than 40 inches in men or 35 inches in women can increase the risk of weight-related health problems. · Talk with your doctor about steps you can take to stay healthy or improve your health. You may need to make lifestyle changes to lose weight and stay healthy, such as changing your diet and getting regular exercise.   If you have a BMI lower than 18.5  · Your doctor may do other tests to check your risk for health problems. · Talk with your doctor about steps you can take to stay healthy or improve your health. You may need to make lifestyle changes to gain or maintain weight and stay healthy, such as getting more healthy foods in your diet and doing exercises to build muscle. Where can you learn more? Go to http://gosia-ailyn.info/. Enter S176 in the search box to learn more about \"Body Mass Index: Care Instructions. \"  Current as of: October 13, 2016  Content Version: 11.4  © 4604-6010 [employer]. Care instructions adapted under license by [employer] (which disclaims liability or warranty for this information). If you have questions about a medical condition or this instruction, always ask your healthcare professional. [address] any warranty or liability for your use of this information.

## 2018-08-13 NOTE — MR AVS SNAPSHOT
[address] [facility] 7 [address] 
[phone] Patient: Franco Vital MRN: [id number] KMS:[id number] Visit Information Date & Time Provider Department Dept. Phone Encounter #  
 8/13/2018  5:00 PM MD Rafaela David [facility] [phone] [id number] Follow-up Instructions Return in about 6 months (around 2/13/2019), or if symptoms worsen or fail to improve. Follow-up and Disposition History Your Appointments 8/13/2018  5:00 PM  
ACUTE CARE with Tashi Limon MD  
[address] [facility]ICE[facility] ([facility]) Appt Note: poss [place] [phone] [phone] Upcoming Health Maintenance Date Due DTaP/Tdap/Td series (1 - Tdap) 3/5/1970 ZOSTER VACCINE AGE 60> 1/5/2009 GLAUCOMA SCREENING Q2Y 3/5/2014 Pneumococcal 65+ Low/Medium Risk (2 of 2 - PCV13) 11/7/2016 Influenza Age 5 to Adult 10/31/2018* MEDICARE YEARLY EXAM 9/28/2018 COLONOSCOPY 3/30/2020 BREAST CANCER SCRN MAMMOGRAM 7/24/2020 *Topic was postponed. The date shown is not the original due date. Allergies as of 8/13/2018  Review Complete On: 8/13/2018 By: Em Cornejo LPN No Known Allergies Current Immunizations  Reviewed on 9/27/2017 Name Date Influenza High Dose Vaccine PF 9/27/2017, 9/15/2016, 11/7/2015 Influenza Vaccine 11/17/2013 Influenza Vaccine Split 11/14/2012, 10/10/2011 Pneumococcal Conjugate (PCV-13)  Deferred (Patient Refused) Pneumococcal Polysaccharide (PPSV-23) 11/7/2015 Tdap  Deferred (Patient Refused),  Deferred (Patient Refused) Not reviewed this visit You Were Diagnosed With   
  
 Codes Comments Urine frequency    -  Primary ICD-10-CM: R35.0 ICD-9-CM: 788.41 Pyuria     ICD-10-CM: N39.0 ICD-9-CM: 791.9 Vitals BP Pulse Temp Resp Height(growth percentile) Weight(growth percentile) 139/73 (BP 1 Location: Left arm, BP Patient Position: At rest) 75 96.9 °F (36.1 °C) (Oral) 16 5' 3.5\" (1.613 m) 158 lb 12.8 oz (72 kg) SpO2 BMI OB Status Smoking Status 96% 27.69 kg/m2 Hysterectomy Never Smoker Vitals History BMI and BSA Data Body Mass Index Body Surface Area  
 27.69 kg/m 2 1.8 m 2 Preferred Pharmacy Pharmacy Name Phone [facility] [address] [place] [address] [phone] Your Updated Medication List  
  
   
This list is accurate as of 8/13/18 11:53 AM.  Always use your most recent med list.  
  
  
  
  
 aspirin 81 mg chewable tablet Take 1 Tab by mouth daily. calcium-cholecalciferol (D3) tablet Commonly known as:  CALTRATE 600+D Take 1 Tab by mouth daily. ciprofloxacin HCl 500 mg tablet Commonly known as:  CIPRO Take 1 Tab by mouth two (2) times a day for 7 days. hydroCHLOROthiazide 25 mg tablet Commonly known as:  HYDRODIURIL  
TAKE ONE TABLET BY MOUTH DAILY phenazopyridine 200 mg tablet Commonly known as:  PYRIDIUM Take 1 Tab by mouth three (3) times daily as needed for Pain for up to 3 days. Indications: URINARY TRACT IRRITATION  
  
 simvastatin 10 mg tablet Commonly known as:  ZOCOR  
TAKE ONE TABLET BY MOUTH EVERY NIGHT  
  
 venlafaxine 75 mg tablet Commonly known as:  EFFEXOR  
TAKE ONE TABLET BY MOUTH DAILY Prescriptions Sent to Pharmacy Refills  
 ciprofloxacin HCl (CIPRO) 500 mg tablet 0 Sig: Take 1 Tab by mouth two (2) times a day for 7 days. Class: Normal  
 Pharmacy: [facility] [address] [place] [address] Ph #: [phone] Route: Oral  
 phenazopyridine (PYRIDIUM) 200 mg tablet 0 Sig: Take 1 Tab by mouth three (3) times daily as needed for Pain for up to 3 days.  Indications: URINARY TRACT IRRITATION  
 Class: Normal  
 Pharmacy: [facility] [address] [place] [address], [address] [place] [address] #: [phone] Route: Oral  
  
We Performed the Following AMB POC URINALYSIS DIP STICK AUTO W/O MICRO [77569 CPT(R)] CULTURE, URINE H0222006 CPT(R)] Follow-up Instructions Return in about 6 months (around 2/13/2019), or if symptoms worsen or fail to improve. Patient Instructions Urinary Tract Infection in Women: Care Instructions Your Care Instructions A urinary tract infection, or UTI, is a general term for an infection anywhere between the kidneys and the urethra (where urine comes out). Most UTIs are bladder infections. They often cause pain or burning when you urinate. UTIs are caused by bacteria and can be cured with antibiotics. Be sure to complete your treatment so that the infection goes away. Follow-up care is a key part of your treatment and safety. Be sure to make and go to all appointments, and call your doctor if you are having problems. It's also a good idea to know your test results and keep a list of the medicines you take. How can you care for yourself at home? · Take your antibiotics as directed. Do not stop taking them just because you feel better. You need to take the full course of antibiotics. · Drink extra water and other fluids for the next day or two. This may help wash out the bacteria that are causing the infection. (If you have kidney, heart, or liver disease and have to limit fluids, talk with your doctor before you increase your fluid intake.) · Avoid drinks that are carbonated or have caffeine. They can irritate the bladder. · Urinate often. Try to empty your bladder each time. · To relieve pain, take a hot bath or lay a heating pad set on low over your lower belly or genital area. Never go to sleep with a heating pad in place. To prevent UTIs · Drink plenty of water each day.  This helps you urinate often, which clears bacteria from your system. (If you have kidney, heart, or liver disease and have to limit fluids, talk with your doctor before you increase your fluid intake.) · Urinate when you need to. · Urinate right after you have sex. · Change sanitary pads often. · Avoid douches, bubble baths, feminine hygiene sprays, and other feminine hygiene products that have deodorants. · After going to the bathroom, wipe from front to back. When should you call for help? Call your doctor now or seek immediate medical care if: 
  · Symptoms such as fever, chills, nausea, or vomiting get worse or appear for the first time.  
  · You have new pain in your back just below your rib cage. This is called flank pain.  
  · There is new blood or pus in your urine.  
  · You have any problems with your antibiotic medicine.  
 Watch closely for changes in your health, and be sure to contact your doctor if: 
  · You are not getting better after taking an antibiotic for 2 days.  
  · Your symptoms go away but then come back. Where can you learn more? Go to http://gosia-ailyn.info/. Enter T000 in the search box to learn more about \"Urinary Tract Infection in Women: Care Instructions. \" Current as of: May 12, 2017 Content Version: 11.7 © 2446-2213 [employer]. Care instructions adapted under license by [employer] (which disclaims liability or warranty for this information). If you have questions about a medical condition or this instruction, always ask your healthcare professional. [place] [address] any warranty or liability for your use of this information. Body Mass Index: Care Instructions Your Care Instructions Body mass index (BMI) can help you see if your weight is raising your risk for health problems. It uses a formula to compare how much you weigh with how tall you are. · A BMI lower than 18.5 is considered underweight. · A BMI between 18.5 and 24.9 is considered healthy. · A BMI between 25 and 29.9 is considered overweight. A BMI of 30 or higher is considered obese. If your BMI is in the normal range, it means that you have a lower risk for weight-related health problems. If your BMI is in the overweight or obese range, you may be at increased risk for weight-related health problems, such as high blood pressure, heart disease, stroke, arthritis or joint pain, and diabetes. If your BMI is in the underweight range, you may be at increased risk for health problems such as fatigue, lower protection (immunity) against illness, muscle loss, bone loss, hair loss, and hormone problems. BMI is just one measure of your risk for weight-related health problems. You may be at higher risk for health problems if you are not active, you eat an unhealthy diet, or you drink too much alcohol or use tobacco products. Follow-up care is a key part of your treatment and safety. Be sure to make and go to all appointments, and call your doctor if you are having problems. It's also a good idea to know your test results and keep a list of the medicines you take. How can you care for yourself at home? · Practice healthy eating habits. This includes eating plenty of fruits, vegetables, whole grains, lean protein, and low-fat dairy. · If your doctor recommends it, get more exercise. Walking is a good choice. Bit by bit, increase the amount you walk every day. Try for at least 30 minutes on most days of the week. · Do not smoke. Smoking can increase your risk for health problems. If you need help quitting, talk to your doctor about stop-smoking programs and medicines. These can increase your chances of quitting for good. · Limit alcohol to 2 drinks a day for men and 1 drink a day for women. Too much alcohol can cause health problems. If you have a BMI higher than 25 · Your doctor may do other tests to check your risk for weight-related health problems. This may include measuring the distance around your waist. A waist measurement of more than 40 inches in men or 35 inches in women can increase the risk of weight-related health problems. · Talk with your doctor about steps you can take to stay healthy or improve your health. You may need to make lifestyle changes to lose weight and stay healthy, such as changing your diet and getting regular exercise. If you have a BMI lower than 18.5 · Your doctor may do other tests to check your risk for health problems. · Talk with your doctor about steps you can take to stay healthy or improve your health. You may need to make lifestyle changes to gain or maintain weight and stay healthy, such as getting more healthy foods in your diet and doing exercises to build muscle. Where can you learn more? Go to http://gosia-ailyn.info/. Enter S176 in the search box to learn more about \"Body Mass Index: Care Instructions. \" Current as of: October 13, 2016 Content Version: 11.4 © 3949-6182 [employer]. Care instructions adapted under license by [employer] (which disclaims liability or warranty for this information). If you have questions about a medical condition or this instruction, always ask your healthcare professional. [place] [address] any warranty or liability for your use of this information. Patient Instructions History Introducing [facility]! [employer] introduces [facility] patient portal. Now you can access parts of your medical record, email your doctor's office, and request medication refills online. 1. In your internet browser, go to [email or web address] 2. Click on the First Time User? Click Here link in the Sign In box. You will see the New Member Sign Up page. 3. Enter your [facility] Access Code exactly as it appears below.  You will not need to use this code after youve completed the sign-up process. If you do not sign up before the expiration date, you must request a new code. · Voxbone Access Code: [email or web address] Expires: 11/11/2018 11:53 AM 
 
4. Enter the last four digits of your Social Security Number (xxxx) and Date of Birth (mm/dd/yyyy) as indicated and click Submit. You will be taken to the next sign-up page. 5. Create a Voxbone ID. This will be your Voxbone login ID and cannot be changed, so think of one that is secure and easy to remember. 6. Create a Voxbone password. You can change your password at any time. 7. Enter your Password Reset Question and Answer. This can be used at a later time if you forget your password. 8. Enter your e-mail address. You will receive e-mail notification when new information is available in [address]. 9. Click Sign Up. You can now view and download portions of your medical record. 10. Click the Download Summary menu link to download a portable copy of your medical information. If you have questions, please visit the Frequently Asked Questions section of the Voxbone website. Remember, Voxbone is NOT to be used for urgent needs. For medical emergencies, dial 911. Now available from your iPhone and Android! Please provide this summary of care documentation to your next provider. Your primary care clinician is listed as Ting Parra. If you have any questions after today's visit, please call [phone].

## 2018-08-15 LAB — BACTERIA UR CULT: NORMAL

## 2018-11-01 ENCOUNTER — CLINICAL SUPPORT (OUTPATIENT)
Dept: FAMILY MEDICINE CLINIC | Age: 68
End: 2018-11-01

## 2018-11-01 DIAGNOSIS — Z23 ENCOUNTER FOR IMMUNIZATION: Primary | ICD-10-CM

## 2018-11-01 NOTE — PROGRESS NOTES
After obtaining consent, and per orders of ,flu vaccine  given to  Right deltoid IM  . Patient instructed to remain in clinic for 15 minutes afterwards, and to report any adverse reaction to me immediately.  Patient did not have any adverse reactions during this office visit

## 2018-11-01 NOTE — PATIENT INSTRUCTIONS
Vaccine Information Statement    Influenza (Flu) Vaccine (Inactivated or Recombinant): What you need to know    Many Vaccine Information Statements are available in Nepali and other languages. See www.immunize.org/vis  Hojas de Información Sobre Vacunas están disponibles en Español y en muchos otros idiomas. Visite www.immunize.org/vis    1. Why get vaccinated? Influenza (flu) is a contagious disease that spreads around the [place] every year, usually between October and May. Flu is caused by influenza viruses, and is spread mainly by coughing, sneezing, and close contact. Anyone can get flu. Flu strikes suddenly and can last several days. Symptoms vary by age, but can include:   fever/chills   sore throat   muscle aches   fatigue   cough   headache    runny or stuffy nose    Flu can also lead to pneumonia and blood infections, and cause diarrhea and seizures in children. If you have a medical condition, such as heart or lung disease, flu can make it worse. Flu is more dangerous for some people. Infants and young children, people 72years of age and older, pregnant women, and people with certain health conditions or a weakened immune system are at greatest risk. Each year thousands of people in the [facility] die from flu, and many more are hospitalized. Flu vaccine can:   keep you from getting flu,   make flu less severe if you do get it, and   keep you from spreading flu to your family and other people. 2. Inactivated and recombinant flu vaccines    A dose of flu vaccine is recommended every flu season. Children 6 months through 6years of age may need two doses during the same flu season. Everyone else needs only one dose each flu season.        Some inactivated flu vaccines contain a very small amount of a mercury-based preservative called thimerosal. Studies have not shown thimerosal in vaccines to be harmful, but flu vaccines that do not contain thimerosal are available. There is no live flu virus in flu shots. They cannot cause the flu. There are many flu viruses, and they are always changing. Each year a new flu vaccine is made to protect against three or four viruses that are likely to cause disease in the upcoming flu season. But even when the vaccine doesnt exactly match these viruses, it may still provide some protection    Flu vaccine cannot prevent:   flu that is caused by a virus not covered by the vaccine, or   illnesses that look like flu but are not. It takes about 2 weeks for protection to develop after vaccination, and protection lasts through the flu season. 3. Some people should not get this vaccine    Tell the person who is giving you the vaccine:     If you have any severe, life-threatening allergies. If you ever had a life-threatening allergic reaction after a dose of flu vaccine, or have a severe allergy to any part of this vaccine, you may be advised not to get vaccinated. Most, but not all, types of flu vaccine contain a small amount of egg protein.  If you ever had Guillain-Barré Syndrome (also called GBS). Some people with a history of GBS should not get this vaccine. This should be discussed with your doctor.  If you are not feeling well. It is usually okay to get flu vaccine when you have a mild illness, but you might be asked to come back when you feel better. 4. Risks of a vaccine reaction    With any medicine, including vaccines, there is a chance of reactions. These are usually mild and go away on their own, but serious reactions are also possible. Most people who get a flu shot do not have any problems with it.      Minor problems following a flu shot include:    soreness, redness, or swelling where the shot was given     hoarseness   sore, red or itchy eyes   cough   fever   aches   headache   itching   fatigue  If these problems occur, they usually begin soon after the shot and last 1 or 2 days. More serious problems following a flu shot can include the following:     There may be a small increased risk of Guillain-Barré Syndrome (GBS) after inactivated flu vaccine. This risk has been estimated at 1 or 2 additional cases per million people vaccinated. This is much lower than the risk of severe complications from flu, which can be prevented by flu vaccine.  Young children who get the flu shot along with pneumococcal vaccine (PCV13) and/or DTaP vaccine at the same time might be slightly more likely to have a seizure caused by fever. Ask your doctor for more information. Tell your doctor if a child who is getting flu vaccine has ever had a seizure. Problems that could happen after any injected vaccine:      People sometimes faint after a medical procedure, including vaccination. Sitting or lying down for about 15 minutes can help prevent fainting, and injuries caused by a fall. Tell your doctor if you feel dizzy, or have vision changes or ringing in the ears.  Some people get severe pain in the shoulder and have difficulty moving the arm where a shot was given. This happens very rarely.  Any medication can cause a severe allergic reaction. Such reactions from a vaccine are very rare, estimated at about 1 in a million doses, and would happen within a few minutes to a few hours after the vaccination. As with any medicine, there is a very remote chance of a vaccine causing a serious injury or death. The safety of vaccines is always being monitored. For more information, visit: www.cdc.gov/vaccinesafety/    5. What if there is a serious reaction? What should I look for?  Look for anything that concerns you, such as signs of a severe allergic reaction, very high fever, or unusual behavior.     Signs of a severe allergic reaction can include hives, swelling of the face and throat, difficulty breathing, a fast heartbeat, dizziness, and weakness - usually within a few minutes to a few hours after the vaccination. What should I do?  If you think it is a severe allergic reaction or other emergency that cant wait, call 9-1-1 and get the person to the nearest hospital. Otherwise, call your doctor.  Reactions should be reported to the Vaccine Adverse Event Reporting System (VAERS). Your doctor should file this report, or you can do it yourself through  the VAERS web site at www.vaers. [facility].gov, or by calling 6-328.347.1488. VAERS does not give medical advice. 6. The National Vaccine Injury Compensation Program    The [facility] Vaccine Injury Compensation Program (VICP) is a federal program that was created to compensate people who may have been injured by certain vaccines. Persons who believe they may have been injured by a vaccine can learn about the program and about filing a claim by calling 9-957.514.6936 or visiting the [employer] website at www.[facility].gov/vaccinecompensation. There is a time limit to file a claim for compensation. 7. How can I learn more?  Ask your healthcare provider. He or she can give you the vaccine package insert or suggest other sources of information.  Call your local or state health department.  Contact the Centers for Disease Control and Prevention (CDC):  - Call 8-436.341.3547 (1-800-CDC-INFO) or  - Visit CDCs website at www.cdc.gov/flu    Vaccine Information Statement   Inactivated Influenza Vaccine   8/7/2015  41 GISEL Cook Officer [id number]    Department of Health and Human Services  Centers for Disease Control and Prevention    Office Use Only

## 2018-11-17 ENCOUNTER — APPOINTMENT (OUTPATIENT)
Dept: CT IMAGING | Age: 68
End: 2018-11-17
Attending: NURSE PRACTITIONER
Payer: MEDICARE

## 2018-11-17 ENCOUNTER — APPOINTMENT (OUTPATIENT)
Dept: GENERAL RADIOLOGY | Age: 68
End: 2018-11-17
Attending: NURSE PRACTITIONER
Payer: MEDICARE

## 2018-11-17 ENCOUNTER — HOSPITAL ENCOUNTER (EMERGENCY)
Age: 68
Discharge: HOME | End: 2018-11-17
Attending: EMERGENCY MEDICINE
Payer: MEDICARE

## 2018-11-17 VITALS
RESPIRATION RATE: 18 BRPM | SYSTOLIC BLOOD PRESSURE: 137 MMHG | OXYGEN SATURATION: 100 % | TEMPERATURE: 98.2 F | DIASTOLIC BLOOD PRESSURE: 70 MMHG | WEIGHT: 160 LBS | BODY MASS INDEX: 27.31 KG/M2 | HEART RATE: 90 BPM | HEIGHT: 64 IN

## 2018-11-17 DIAGNOSIS — R10.13 DYSPEPSIA: Primary | ICD-10-CM

## 2018-11-17 LAB
ALBUMIN SERPL-MCNC: 3.6 G/DL (ref 3.5–5)
ALBUMIN/GLOB SERPL: 0.8 {RATIO} (ref 1.1–2.2)
ALP SERPL-CCNC: 89 U/L (ref 45–117)
ALT SERPL-CCNC: 21 U/L (ref 12–78)
ANION GAP SERPL CALC-SCNC: 7 MMOL/L (ref 5–15)
APPEARANCE UR: CLEAR
AST SERPL-CCNC: 30 U/L (ref 15–37)
BACTERIA URNS QL MICRO: NEGATIVE /HPF
BASOPHILS # BLD: 0 K/UL (ref 0–0.1)
BASOPHILS NFR BLD: 0 % (ref 0–1)
BILIRUB SERPL-MCNC: 0.5 MG/DL (ref 0.2–1)
BILIRUB UR QL: NEGATIVE
BUN SERPL-MCNC: 10 MG/DL (ref 6–20)
BUN/CREAT SERPL: 15 (ref 12–20)
CALCIUM SERPL-MCNC: 8.7 MG/DL (ref 8.5–10.1)
CHLORIDE SERPL-SCNC: 102 MMOL/L (ref 97–108)
CO2 SERPL-SCNC: 30 MMOL/L (ref 21–32)
COLOR UR: ABNORMAL
CREAT SERPL-MCNC: 0.67 MG/DL (ref 0.55–1.02)
DIFFERENTIAL METHOD BLD: ABNORMAL
EOSINOPHIL # BLD: 0.1 K/UL (ref 0–0.4)
EOSINOPHIL NFR BLD: 1 % (ref 0–7)
EPITH CASTS URNS QL MICRO: ABNORMAL /LPF
ERYTHROCYTE [DISTWIDTH] IN BLOOD BY AUTOMATED COUNT: 15.2 % (ref 11.5–14.5)
GLOBULIN SER CALC-MCNC: 4.4 G/DL (ref 2–4)
GLUCOSE SERPL-MCNC: 86 MG/DL (ref 65–100)
GLUCOSE UR STRIP.AUTO-MCNC: NEGATIVE MG/DL
HCT VFR BLD AUTO: 40.1 % (ref 35–47)
HGB BLD-MCNC: 12.8 G/DL (ref 11.5–16)
HGB UR QL STRIP: ABNORMAL
IMM GRANULOCYTES # BLD: 0 K/UL (ref 0–0.04)
IMM GRANULOCYTES NFR BLD AUTO: 0 % (ref 0–0.5)
KETONES UR QL STRIP.AUTO: NEGATIVE MG/DL
LEUKOCYTE ESTERASE UR QL STRIP.AUTO: ABNORMAL
LIPASE SERPL-CCNC: 96 U/L (ref 73–393)
LYMPHOCYTES # BLD: 2.1 K/UL (ref 0.8–3.5)
LYMPHOCYTES NFR BLD: 19 % (ref 12–49)
MCH RBC QN AUTO: 25.5 PG (ref 26–34)
MCHC RBC AUTO-ENTMCNC: 31.9 G/DL (ref 30–36.5)
MCV RBC AUTO: 80 FL (ref 80–99)
MONOCYTES # BLD: 0.8 K/UL (ref 0–1)
MONOCYTES NFR BLD: 7 % (ref 5–13)
NEUTS SEG # BLD: 8.2 K/UL (ref 1.8–8)
NEUTS SEG NFR BLD: 73 % (ref 32–75)
NITRITE UR QL STRIP.AUTO: NEGATIVE
NRBC # BLD: 0 K/UL (ref 0–0.01)
NRBC BLD-RTO: 0 PER 100 WBC
PH UR STRIP: 7 [PH] (ref 5–8)
PLATELET # BLD AUTO: 296 K/UL (ref 150–400)
PMV BLD AUTO: 10 FL (ref 8.9–12.9)
POTASSIUM SERPL-SCNC: 4.3 MMOL/L (ref 3.5–5.1)
PROT SERPL-MCNC: 8 G/DL (ref 6.4–8.2)
PROT UR STRIP-MCNC: NEGATIVE MG/DL
RBC # BLD AUTO: 5.01 M/UL (ref 3.8–5.2)
RBC #/AREA URNS HPF: ABNORMAL /HPF (ref 0–5)
SODIUM SERPL-SCNC: 139 MMOL/L (ref 136–145)
SP GR UR REFRACTOMETRY: 1.01 (ref 1–1.03)
UA: UC IF INDICATED,UAUC: ABNORMAL
UROBILINOGEN UR QL STRIP.AUTO: 0.2 EU/DL (ref 0.2–1)
WBC # BLD AUTO: 11.3 K/UL (ref 3.6–11)
WBC URNS QL MICRO: ABNORMAL /HPF (ref 0–4)

## 2018-11-17 PROCEDURE — 99283 EMERGENCY DEPT VISIT LOW MDM: CPT

## 2018-11-17 PROCEDURE — 83690 ASSAY OF LIPASE: CPT

## 2018-11-17 PROCEDURE — 85025 COMPLETE CBC W/AUTO DIFF WBC: CPT

## 2018-11-17 PROCEDURE — 80053 COMPREHEN METABOLIC PANEL: CPT

## 2018-11-17 PROCEDURE — 81001 URINALYSIS AUTO W/SCOPE: CPT

## 2018-11-17 PROCEDURE — 74018 RADEX ABDOMEN 1 VIEW: CPT

## 2018-11-17 RX ORDER — FAMOTIDINE 20 MG/1
20 TABLET, FILM COATED ORAL 2 TIMES DAILY
Qty: 20 TAB | Refills: 0 | Status: SHIPPED | OUTPATIENT
Start: 2018-11-17 | End: 2018-11-27

## 2018-11-17 NOTE — DISCHARGE INSTRUCTIONS
Indigestion (Dyspepsia or Heartburn): Care Instructions  Your Care Instructions  Sometimes it can be hard to pinpoint the cause of indigestion. (It is also called dyspepsia or heartburn.) Most cases of an upset stomach with bloating, burning, burping, and nausea are minor and go away within several hours. Home treatment and over-the-counter medicine often are able to control symptoms. But if you take medicine to relieve your indigestion without making diet and lifestyle changes, your symptoms are likely to return again and again. If you get indigestion often, it may be a sign of a more serious medical problem. Be sure to follow up with your doctor, who may want to do tests to be sure of the cause of your indigestion. Follow-up care is a key part of your treatment and safety. Be sure to make and go to all appointments, and call your doctor if you are having problems. It's also a good idea to know your test results and keep a list of the medicines you take. How can you care for yourself at home? · Your doctor may recommend over-the-counter medicine. For mild or occasional indigestion, antacids such as Gaviscon, Mylanta, Maalox, or Tums, may help. Be safe with medicines. Be careful when you take over-the-counter antacid medicines. Many of these medicines have aspirin in them. Read the label to make sure that you are not taking more than the recommended dose. Too much aspirin can be harmful. · Your doctor also may recommend over-the-counter acid reducers, such as Pepcid AC, Tagamet HB, Zantac 75, or Prilosec. Read and follow all instructions on the label. If you use these medicines often, talk with your doctor. · Change your eating habits. ? It's best to eat several small meals instead of two or three large meals. ? After you eat, wait 2 to 3 hours before you lie down. ? Chocolate, mint, and alcohol can make GERD worse. ?  Spicy foods, foods that have a lot of acid (like tomatoes and oranges), and coffee can make GERD symptoms worse in some people. If your symptoms are worse after you eat a certain food, you may want to stop eating that food to see if your symptoms get better. · Do not smoke or chew tobacco. Smoking can make GERD worse. If you need help quitting, talk to your doctor about stop-smoking programs and medicines. These can increase your chances of quitting for good. · If you have GERD symptoms at night, raise the head of your bed 6 to 8 inches. You can do this by putting the frame on blocks or placing a foam wedge under the head of your mattress. (Adding extra pillows does not work.)  · Do not wear tight clothing around your middle. · Lose weight if you need to. Losing just 5 to 10 pounds can help. · Do not take anti-inflammatory medicines, such as aspirin, ibuprofen (Advil, Motrin), or naproxen (Aleve). These can irritate the stomach. If you need a pain medicine, try acetaminophen (Tylenol), which does not cause stomach upset. When should you call for help? Call your doctor now or seek immediate medical care if:    · You have new or worse belly pain.     · You are vomiting.    Watch closely for changes in your health, and be sure to contact your doctor if:    · You have new or worse symptoms of indigestion.     · You have trouble or pain swallowing.     · You are losing weight.     · You do not get better as expected. Where can you learn more? Go to http://gosia-ailyn.info/. Enter B277 in the search box to learn more about \"Indigestion (Dyspepsia or Heartburn): Care Instructions. \"  Current as of: March 28, 2018  Content Version: 11.8  © 1484-5939 [employer]. Care instructions adapted under license by [employer] (which disclaims liability or warranty for this information).  If you have questions about a medical condition or this instruction, always ask your healthcare professional. [address] any warranty or liability for your use of this information.

## 2018-11-18 NOTE — ED PROVIDER NOTES
EMERGENCY DEPARTMENT HISTORY AND PHYSICAL EXAM 
 
Date: 11/17/2018 Patient Name: Rinku Mari History of Presenting Illness Chief Complaint Patient presents with  Abdominal Pain  
  lower abdomen and BLE flanks History Provided By: Patient Chief Complaint: abdominal pain Duration: 3 Days Timing:  Acute Location: upper abdomen Quality: Aching Severity: 7 out of 10 Modifying Factors: none Associated Symptoms: denies any other associated signs or symptoms HPI: Rinku Mari is a 70 y.o. female with a PMH of hypertension who presents with abdominal pain for the past few days. States she thinks she has indigestion. States she is able to eat and in moving bowels normally. Denies nausea vomiting. Pain radiates to back . Denies dysuria and hematuria PCP: Reuben Montelongo MD 
 
Current Outpatient Medications Medication Sig Dispense Refill  famotidine (PEPCID) 20 mg tablet Take 1 Tab by mouth two (2) times a day for 10 days. 20 Tab 0  
 venlafaxine (EFFEXOR) 75 mg tablet TAKE ONE TABLET BY MOUTH DAILY 90 Tab 3  
 simvastatin (ZOCOR) 10 mg tablet TAKE ONE TABLET BY MOUTH EVERY NIGHT 90 Tab 3  
 hydroCHLOROthiazide (HYDRODIURIL) 25 mg tablet TAKE ONE TABLET BY MOUTH DAILY 90 Tab 3  
 calcium-cholecalciferol, D3, (CALTRATE 600+D) tablet Take 1 Tab by mouth daily. 30 Tab 0  
 aspirin 81 mg chewable tablet Take 1 Tab by mouth daily. 30 Tab 11 Past History Past Medical History: 
Past Medical History:  
Diagnosis Date  Contact dermatitis and other eczema, due to unspecified cause  Hot flashes, menopausal 4/21/2010  
 HTN (hypertension) 3/29/2010  Insomnia 3/29/2010  Knee pain, right 6/4/2015  Osteopenia 4/21/2010  Prediabetes 6/8/2012  Recurrent sinusitis 3/7/2016  Screening for osteoporosis 3/7/2016 Past Surgical History: 
Past Surgical History:  
Procedure Laterality Date  ENDOSCOPY, COLON, DIAGNOSTIC    
 in 2009  HX BREAST BIOPSY Left 1970  
 benign:  no visible scar  HX HYSTERECTOMY Family History: 
Family History Problem Relation Age of Onset  Stroke Mother  Hypertension Sister  Hypertension Brother  Hypertension Sister  Hypertension Brother Social History: 
Social History Tobacco Use  Smoking status: Never Smoker  Smokeless tobacco: Never Used  Tobacco comment: quit 30 yrs ago Substance Use Topics  Alcohol use: No  
  Comment: occation  Drug use: No  
 
 
Allergies: 
No Known Allergies Review of Systems Review of Systems Constitutional: Negative for fatigue and fever. Respiratory: Negative for shortness of breath and wheezing. Cardiovascular: Negative for chest pain and palpitations. Gastrointestinal: Negative for abdominal pain. Musculoskeletal: Negative for arthralgias, myalgias, neck pain and neck stiffness. Skin: Negative for pallor and rash. Neurological: Negative for dizziness, tremors, weakness and headaches. Hematological: Negative for adenopathy. All other systems reviewed and are negative. Physical Exam  
 
Vitals:  
 11/17/18 1203 BP: 137/70 Pulse: 90 Resp: 18 Temp: 98.2 °F (36.8 °C) SpO2: 100% Weight: 72.6 kg (160 lb) Height: 5' 3.5\" (1.613 m) Physical Exam  
Constitutional: She is oriented to person, place, and time. She appears well-developed and well-nourished. No distress. HENT:  
Head: Normocephalic and atraumatic. Nose: Nose normal.  
Mouth/Throat: Oropharynx is clear and moist. No oropharyngeal exudate. Eyes: Conjunctivae and EOM are normal. Right eye exhibits no discharge. Left eye exhibits no discharge. Neck: Normal range of motion. Neck supple. No tracheal deviation present. Cardiovascular: Normal rate, regular rhythm and normal heart sounds. No murmur heard.  
Pulmonary/Chest: Effort normal and breath sounds normal. No respiratory distress. She has no wheezes. She has no rales. She exhibits no tenderness. Abdominal: Soft. Bowel sounds are normal. She exhibits no distension. There is tenderness (upper quadrant tenderness no CVAT). There is no rebound. Musculoskeletal: Normal range of motion. She exhibits no edema or tenderness. Lymphadenopathy:  
  She has no cervical adenopathy. Neurological: She is alert and oriented to person, place, and time. No cranial nerve deficit. Skin: Skin is warm and dry. No rash noted. No erythema. Psychiatric: She has a normal mood and affect. Her behavior is normal. Judgment and thought content normal.  
Nursing note and vitals reviewed. Diagnostic Study Results Labs - No results found for this or any previous visit (from the past 12 hour(s)). Radiologic Studies -  
XR ABD (KUB) Final Result CT Results  (Last 48 hours) None CXR Results  (Last 48 hours) None Medical Decision Making I am the first provider for this patient. I reviewed the vital signs, available nursing notes, past medical history, past surgical history, family history and social history. Vital Signs-Reviewed the patient's vital signs. Records Reviewed: Nursing Notes Progress note Discussed lab results with patient. Ct ordered. Patient unable to tolerate CT stated she is claustrophobic. Discussed pre medication with ativan. Patient declined medication and another attempt to comlete CT Disposition: 
home DISCHARGE NOTE:  
 
 
 
  Care plan outlined and precautions discussed. Patient has no new complaints, changes, or physical findings. Results of tests were reviewed with the patient. All medications were reviewed with the patient; will d/c home with pepcid and GI follow up. . All of pt's questions and concerns were addressed. Patient was instructed and agrees to follow up with GI, as well as to return to the ED upon further deterioration.  Patient is ready to go home. Follow-up Information Follow up With Specialties Details Why Contact Info Esau Guerin MD Internal Medicine, Gastroenterology In 2 days  [address] [facility] [address] [place] [address] 
[phone] Discharge Medication List as of 11/17/2018  3:14 PM  
  
CONTINUE these medications which have NOT CHANGED Details  
venlafaxine (EFFEXOR) 75 mg tablet TAKE ONE TABLET BY MOUTH DAILY, Normal, Disp-90 Tab, R-3  
  
simvastatin (ZOCOR) 10 mg tablet TAKE ONE TABLET BY MOUTH EVERY NIGHT, Normal, Disp-90 Tab, R-3  
  
hydroCHLOROthiazide (HYDRODIURIL) 25 mg tablet TAKE ONE TABLET BY MOUTH DAILY, Normal, Disp-90 Tab, R-3  
  
calcium-cholecalciferol, D3, (CALTRATE 600+D) tablet Take 1 Tab by mouth daily. , Normal, Disp-30 Tab, R-0  
  
aspirin 81 mg chewable tablet Take 1 Tab by mouth daily. , No Print, Disp-30 Tab, R-11 Provider Notes (Medical Decision Making): DDX kidney stone dyspepsia gastritis Procedures: 
Procedures Diagnosis Clinical Impression: 1. Dyspepsia

## 2018-12-26 ENCOUNTER — OFFICE VISIT (OUTPATIENT)
Dept: FAMILY MEDICINE CLINIC | Age: 68
End: 2018-12-26

## 2018-12-26 VITALS
SYSTOLIC BLOOD PRESSURE: 128 MMHG | DIASTOLIC BLOOD PRESSURE: 73 MMHG | RESPIRATION RATE: 18 BRPM | BODY MASS INDEX: 27.2 KG/M2 | OXYGEN SATURATION: 96 % | TEMPERATURE: 98.7 F | WEIGHT: 159.3 LBS | HEART RATE: 87 BPM | HEIGHT: 64 IN

## 2018-12-26 DIAGNOSIS — R51.9 NONINTRACTABLE EPISODIC HEADACHE, UNSPECIFIED HEADACHE TYPE: Primary | ICD-10-CM

## 2018-12-26 DIAGNOSIS — J02.9 ACUTE SORE THROAT: ICD-10-CM

## 2018-12-26 DIAGNOSIS — J34.89 SINUS PAIN: ICD-10-CM

## 2018-12-26 RX ORDER — HYDROCODONE POLISTIREX AND CHLORPHENIRAMINE POLISTIREX 10; 8 MG/5ML; MG/5ML
5 SUSPENSION, EXTENDED RELEASE ORAL
Qty: 90 ML | Refills: 0 | Status: SHIPPED | OUTPATIENT
Start: 2018-12-26 | End: 2020-06-05 | Stop reason: ALTCHOICE

## 2018-12-26 RX ORDER — ALBUTEROL SULFATE 90 UG/1
1 AEROSOL, METERED RESPIRATORY (INHALATION)
Qty: 1 INHALER | Refills: 1 | Status: SHIPPED | OUTPATIENT
Start: 2018-12-26 | End: 2022-07-06

## 2018-12-26 RX ORDER — LEVOFLOXACIN 500 MG/1
500 TABLET, FILM COATED ORAL DAILY
Qty: 10 TAB | Refills: 0 | Status: SHIPPED | OUTPATIENT
Start: 2018-12-26 | End: 2019-06-05 | Stop reason: ALTCHOICE

## 2018-12-26 NOTE — PROGRESS NOTES
Name and  verified      Chief Complaint   Patient presents with    Cough     patient reported for eight days     PANT ENCOURAGED WITH A REMINDEDTO SCHEDULE AN APPOINTMENT FOR WELLNESS EXAM IN 2-4 WEEKS SHE ACKNOWLEDGED UNDERSTANDING. Health Maintenance reviewed-discussed with patient. 1. Have you been to the ER, urgent care clinic since your last visit? Hospitalized since your last visit? Yes, ED visit abdominal pain 2018. 2. Have you seen or consulted any other health care providers outside of the [address] [place] [address] since your last visit? Include any pap smears or colon screening.  NO

## 2018-12-26 NOTE — PATIENT INSTRUCTIONS
Sore Throat: Care Instructions  Your Care Instructions    Infection by bacteria or a virus causes most sore throats. Cigarette smoke, dry air, air pollution, allergies, and yelling can also cause a sore throat. Sore throats can be painful and annoying. Fortunately, most sore throats go away on their own. If you have a bacterial infection, your doctor may prescribe antibiotics. Follow-up care is a key part of your treatment and safety. Be sure to make and go to all appointments, and call your doctor if you are having problems. It's also a good idea to know your test results and keep a list of the medicines you take. How can you care for yourself at home? · If your doctor prescribed antibiotics, take them as directed. Do not stop taking them just because you feel better. You need to take the full course of antibiotics. · Gargle with warm salt water once an hour to help reduce swelling and relieve discomfort. Use 1 teaspoon of salt mixed in 1 cup of warm water. · Take an over-the-counter pain medicine, such as acetaminophen (Tylenol), ibuprofen (Advil, Motrin), or naproxen (Aleve). Read and follow all instructions on the label. · Be careful when taking over-the-counter cold or flu medicines and Tylenol at the same time. Many of these medicines have acetaminophen, which is Tylenol. Read the labels to make sure that you are not taking more than the recommended dose. Too much acetaminophen (Tylenol) can be harmful. · Drink plenty of fluids. Fluids may help soothe an irritated throat. Hot fluids, such as tea or soup, may help decrease throat pain. · Use over-the-counter throat lozenges to soothe pain. Regular cough drops or hard candy may also help. These should not be given to young children because of the risk of choking. · Do not smoke or allow others to smoke around you. If you need help quitting, talk to your doctor about stop-smoking programs and medicines.  These can increase your chances of quitting for good. · Use a vaporizer or humidifier to add moisture to your bedroom. Follow the directions for cleaning the machine. When should you call for help? Call your doctor now or seek immediate medical care if:    · You have new or worse trouble swallowing.     · Your sore throat gets much worse on one side.    Watch closely for changes in your health, and be sure to contact your doctor if you do not get better as expected. Where can you learn more? Go to http://gosia-ailyn.info/. Enter 062 441 80 [id number] in the search box to learn more about \"Sore Throat: Care Instructions. \"  Current as of: March 28, 2018  Content Version: 11.8  © 8198-1879 [employer]. Care instructions adapted under license by [employer] (which disclaims liability or warranty for this information). If you have questions about a medical condition or this instruction, always ask your healthcare professional. [address] any warranty or liability for your use of this information.

## 2018-12-27 NOTE — PROGRESS NOTES
HISTORY OF PRESENT ILLNESS  Mary Marsh is a 70 y.o. female. HPI     Upper respiratory problem    Started >9 days ago not better, ++chills but nofever no wheezing  otc not helping, have a very bad Sore throat, with a lot of painful Cough which are Productive yellowish , no hx of asthma but maybe of COPD, there has been a lot of decrease in the patient's sleep pattern, in addition there has been some muscle ache responding to OTC , no diarhea, no ear ache,also there has been a decrease in the appetite, has been had an exposure to sick person,            Current Outpatient Medications   Medication Sig Dispense Refill    levoFLOXacin (LEVAQUIN) 500 mg tablet Take 1 Tab by mouth daily. 10 Tab 0    chlorpheniramine-HYDROcodone (TUSSIONEX) 10-8 mg/5 mL suspension Take 5 mL by mouth every twelve (12) hours as needed for Cough. Max Daily Amount: 10 mL. 90 mL 0    albuterol (PROVENTIL HFA, VENTOLIN HFA, PROAIR HFA) 90 mcg/actuation inhaler Take 1 Puff by inhalation every four (4) hours as needed for Wheezing. 1 Inhaler 1    venlafaxine (EFFEXOR) 75 mg tablet TAKE ONE TABLET BY MOUTH DAILY 90 Tab 3    simvastatin (ZOCOR) 10 mg tablet TAKE ONE TABLET BY MOUTH EVERY NIGHT 90 Tab 3    hydroCHLOROthiazide (HYDRODIURIL) 25 mg tablet TAKE ONE TABLET BY MOUTH DAILY 90 Tab 3    calcium-cholecalciferol, D3, (CALTRATE 600+D) tablet Take 1 Tab by mouth daily. 30 Tab 0    aspirin 81 mg chewable tablet Take 1 Tab by mouth daily.  30 Tab 11     No Known Allergies  Past Medical History:   Diagnosis Date    Contact dermatitis and other eczema, due to unspecified cause     Hot flashes, menopausal 4/21/2010    HTN (hypertension) 3/29/2010    Insomnia 3/29/2010    Knee pain, right 6/4/2015    Osteopenia 4/21/2010    Prediabetes 6/8/2012    Recurrent sinusitis 3/7/2016    Screening for osteoporosis 3/7/2016     Past Surgical History:   Procedure Laterality Date    ENDOSCOPY, COLON, DIAGNOSTIC      in 2009    HX BREAST BIOPSY Left 1970    benign:  no visible scar    HX HYSTERECTOMY       Family History   Problem Relation Age of Onset   [address] Stroke Mother     Hypertension Sister     Hypertension Brother     Hypertension Sister     Hypertension Brother      Social History     Tobacco Use    Smoking status: Never Smoker    Smokeless tobacco: Never Used    Tobacco comment: quit 30 yrs ago   Substance Use Topics    Alcohol use: No     Comment: occation      Lab Results   Component Value Date/Time    WBC 11.3 (H) 11/17/2018 01:17 PM    HGB 12.8 11/17/2018 01:17 PM    HCT 40.1 11/17/2018 01:17 PM    PLATELET 399 80/01/3127 01:17 PM    MCV 80.0 11/17/2018 01:17 PM     Lab Results   Component Value Date/Time    TSH 2.730 04/02/2018 10:00 AM         Review of Systems   Constitutional: Positive for chills. Negative for fever. HENT: Positive for congestion and sore throat. Negative for ear pain and nosebleeds. Eyes: Negative for blurred vision, pain and discharge. Respiratory: Positive for cough and sputum production. Negative for shortness of breath and wheezing. Cardiovascular: Negative for chest pain and leg swelling. Gastrointestinal: Negative for constipation, diarrhea, nausea and vomiting. Genitourinary: Negative for frequency. Musculoskeletal: Negative for joint pain. Skin: Negative for itching and rash. Neurological: Negative for headaches. Psychiatric/Behavioral: Negative for depression. The patient is not nervous/anxious. Physical Exam   Constitutional: She is oriented to person, place, and time. She appears well-developed and well-nourished. HENT:   Head: Normocephalic and atraumatic. Nose: Mucosal edema and rhinorrhea present. Mouth/Throat: Mucous membranes are dry. Posterior oropharyngeal edema and posterior oropharyngeal erythema present. Eyes: Conjunctivae and EOM are normal.   Neck: Normal range of motion. Neck supple.    Cardiovascular: Normal rate, regular rhythm and normal heart sounds. No murmur heard. Pulmonary/Chest: Effort normal and breath sounds normal.   Abdominal: Soft. Bowel sounds are normal. She exhibits no distension. Musculoskeletal: Normal range of motion. She exhibits no edema. Lymphadenopathy:     She has cervical adenopathy. Neurological: She is alert and oriented to person, place, and time. Skin: No erythema. Psychiatric: Her behavior is normal.   Nursing note and vitals reviewed. ASSESSMENT and PLAN  Diagnoses and all orders for this visit:    1. Nonintractable episodic headache, unspecified headache type  -     levoFLOXacin (LEVAQUIN) 500 mg tablet; Take 1 Tab by mouth daily. -     chlorpheniramine-HYDROcodone (TUSSIONEX) 10-8 mg/5 mL suspension; Take 5 mL by mouth every twelve (12) hours as needed for Cough. Max Daily Amount: 10 mL. -     albuterol (PROVENTIL HFA, VENTOLIN HFA, PROAIR HFA) 90 mcg/actuation inhaler; Take 1 Puff by inhalation every four (4) hours as needed for Wheezing. 2. Acute sore throat  -     levoFLOXacin (LEVAQUIN) 500 mg tablet; Take 1 Tab by mouth daily. -     chlorpheniramine-HYDROcodone (TUSSIONEX) 10-8 mg/5 mL suspension; Take 5 mL by mouth every twelve (12) hours as needed for Cough. Max Daily Amount: 10 mL. -     albuterol (PROVENTIL HFA, VENTOLIN HFA, PROAIR HFA) 90 mcg/actuation inhaler; Take 1 Puff by inhalation every four (4) hours as needed for Wheezing. 3. Sinus pain  -     levoFLOXacin (LEVAQUIN) 500 mg tablet; Take 1 Tab by mouth daily. -     chlorpheniramine-HYDROcodone (TUSSIONEX) 10-8 mg/5 mL suspension; Take 5 mL by mouth every twelve (12) hours as needed for Cough. Max Daily Amount: 10 mL. -     albuterol (PROVENTIL HFA, VENTOLIN HFA, PROAIR HFA) 90 mcg/actuation inhaler; Take 1 Puff by inhalation every four (4) hours as needed for Wheezing.

## 2019-03-31 DIAGNOSIS — N95.1 HOT FLASHES, MENOPAUSAL: ICD-10-CM

## 2019-03-31 DIAGNOSIS — R73.03 PREDIABETES: ICD-10-CM

## 2019-03-31 DIAGNOSIS — G47.00 INSOMNIA, UNSPECIFIED TYPE: ICD-10-CM

## 2019-03-31 DIAGNOSIS — I10 ESSENTIAL HYPERTENSION: ICD-10-CM

## 2019-03-31 DIAGNOSIS — E78.00 HYPERCHOLESTEROLEMIA: ICD-10-CM

## 2019-04-01 RX ORDER — HYDROCHLOROTHIAZIDE 25 MG/1
TABLET ORAL
Qty: 90 TAB | Refills: 2 | Status: SHIPPED | OUTPATIENT
Start: 2019-04-01 | End: 2020-01-03

## 2019-04-24 DIAGNOSIS — E78.00 HYPERCHOLESTEROLEMIA: ICD-10-CM

## 2019-04-24 DIAGNOSIS — G47.00 INSOMNIA, UNSPECIFIED TYPE: ICD-10-CM

## 2019-04-24 DIAGNOSIS — N95.1 HOT FLASHES, MENOPAUSAL: ICD-10-CM

## 2019-04-24 DIAGNOSIS — R73.03 PREDIABETES: ICD-10-CM

## 2019-04-24 DIAGNOSIS — I10 ESSENTIAL HYPERTENSION: ICD-10-CM

## 2019-04-24 RX ORDER — VENLAFAXINE 75 MG/1
TABLET ORAL
Qty: 30 TAB | Refills: 2 | Status: SHIPPED | OUTPATIENT
Start: 2019-04-24 | End: 2019-10-19 | Stop reason: SDUPTHER

## 2019-05-16 DIAGNOSIS — G47.00 INSOMNIA, UNSPECIFIED TYPE: ICD-10-CM

## 2019-05-16 DIAGNOSIS — R73.03 PREDIABETES: ICD-10-CM

## 2019-05-16 DIAGNOSIS — N95.1 HOT FLASHES, MENOPAUSAL: ICD-10-CM

## 2019-05-16 DIAGNOSIS — I10 ESSENTIAL HYPERTENSION: ICD-10-CM

## 2019-05-16 DIAGNOSIS — E78.00 HYPERCHOLESTEROLEMIA: ICD-10-CM

## 2019-05-16 RX ORDER — SIMVASTATIN 10 MG/1
TABLET, FILM COATED ORAL
Qty: 90 TAB | Refills: 2 | Status: SHIPPED | OUTPATIENT
Start: 2019-05-16 | End: 2020-04-02

## 2019-06-05 ENCOUNTER — OFFICE VISIT (OUTPATIENT)
Dept: FAMILY MEDICINE CLINIC | Age: 69
End: 2019-06-05

## 2019-06-05 VITALS
WEIGHT: 157.8 LBS | BODY MASS INDEX: 26.94 KG/M2 | HEIGHT: 64 IN | TEMPERATURE: 97.2 F | RESPIRATION RATE: 20 BRPM | OXYGEN SATURATION: 95 % | DIASTOLIC BLOOD PRESSURE: 74 MMHG | HEART RATE: 72 BPM | SYSTOLIC BLOOD PRESSURE: 125 MMHG

## 2019-06-05 DIAGNOSIS — M85.80 OSTEOPENIA, UNSPECIFIED LOCATION: ICD-10-CM

## 2019-06-05 DIAGNOSIS — Z23 ENCOUNTER FOR IMMUNIZATION: ICD-10-CM

## 2019-06-05 DIAGNOSIS — Z71.89 ADVANCED DIRECTIVES, COUNSELING/DISCUSSION: ICD-10-CM

## 2019-06-05 DIAGNOSIS — M89.9 DISORDER OF BONE AND CARTILAGE: ICD-10-CM

## 2019-06-05 DIAGNOSIS — E78.00 HYPERCHOLESTEROLEMIA: ICD-10-CM

## 2019-06-05 DIAGNOSIS — Z00.00 MEDICARE ANNUAL WELLNESS VISIT, SUBSEQUENT: Primary | ICD-10-CM

## 2019-06-05 DIAGNOSIS — Z13.5 SCREENING FOR GLAUCOMA: ICD-10-CM

## 2019-06-05 DIAGNOSIS — Z13.39 SCREENING FOR ALCOHOLISM: ICD-10-CM

## 2019-06-05 DIAGNOSIS — M94.9 DISORDER OF BONE AND CARTILAGE: ICD-10-CM

## 2019-06-05 DIAGNOSIS — Z12.11 SCREEN FOR COLON CANCER: ICD-10-CM

## 2019-06-05 DIAGNOSIS — Z12.31 ENCOUNTER FOR SCREENING MAMMOGRAM FOR MALIGNANT NEOPLASM OF BREAST: ICD-10-CM

## 2019-06-05 DIAGNOSIS — I10 ESSENTIAL HYPERTENSION: ICD-10-CM

## 2019-06-05 DIAGNOSIS — Z13.31 SCREENING FOR DEPRESSION: ICD-10-CM

## 2019-06-05 DIAGNOSIS — E55.9 VITAMIN D DEFICIENCY: ICD-10-CM

## 2019-06-05 RX ORDER — DEXTROMETHORPHAN HYDROBROMIDE, GUAIFENESIN 5; 100 MG/5ML; MG/5ML
650 LIQUID ORAL
Qty: 40 TAB | Refills: 2 | Status: SHIPPED | OUTPATIENT
Start: 2019-06-05 | End: 2020-01-28

## 2019-06-05 NOTE — PROGRESS NOTES
Name and  verified      Chief Complaint   Patient presents with   [address] [place] [address] reviewed-discussed with patient. 1. Have you been to the ER, urgent care clinic since your last visit? Hospitalized since your last visit? no    2. Have you seen or consulted any other health care providers outside of the [address] [place] [address] since your last visit? Include any pap smears or colon screening.  no

## 2019-06-05 NOTE — PATIENT INSTRUCTIONS
Vaccine Information Statement     Pneumococcal Conjugate Vaccine (PCV13): What You Need to Know    Many Vaccine Information Statements are available in [place] and other languages. See www.immunize.org/vis. Hojas de información Sobre Vacunas están disponibles en español y en muchos otros idiomas. Visite www.immunize.org/vis. 1. Why get vaccinated? Vaccination can protect both children and adults from pneumococcal disease. Pneumococcal disease is caused by bacteria that can spread from person to person through close contact. It can cause ear infections, and it can also lead to more serious infections of the:   Lungs (pneumonia),   Blood (bacteremia), and   Covering of the brain and spinal cord (meningitis). Pneumococcal pneumonia is most common among adults. Pneumococcal meningitis can cause deafness and brain damage, and it kills about 1 child in 10 who get it. Anyone can get pneumococcal disease, but children under 3years of age and adults 72 years and older, people with certain medical conditions, and cigarette smokers are at the highest risk. Before there was a vaccine, the [facility] saw:   more than 700 cases of meningitis,   about 13,000 blood infections,   about 5 million ear infections, and   about 200 deaths  in children under 5 each year from pneumococcal disease. Since vaccine became available, severe pneumococcal disease in these children has fallen by 88%. About 18,000 older adults die of pneumococcal disease each year in the [place]. Treatment of pneumococcal infections with penicillin and other drugs is not as effective as it used to be, because some strains of the disease have become resistant to these drugs. This makes prevention of the disease, through vaccination, even more important. 2. PCV13 vaccine    Pneumococcal conjugate vaccine (called PCV13) protects against 13 types of pneumococcal bacteria.       PCV13 is routinely given to children at 2, 4, 6, and 1515 months of age. It is also recommended for children and adults 3to 59years of age with certain health conditions, and for all adults 72years of age and older. Your doctor can give you details. 3. Some people should not get this vaccine    Anyone who has ever had a life-threatening allergic reaction to a dose of this vaccine, to an earlier pneumococcal vaccine called PCV7, or to any vaccine containing diphtheria toxoid (for example, DTaP), should not get PCV13. Anyone with a severe allergy to any component of PCV13 should not get the vaccine. Tell your doctor if the person being vaccinated has any severe allergies. If the person scheduled for vaccination is not feeling well, your healthcare provider might decide to reschedule the shot on another day. 4. Risks of a vaccine reaction    With any medicine, including vaccines, there is a chance of reactions. These are usually mild and go away on their own, but serious reactions are also possible. Problems reported following PCV13 varied by age and dose in the series. The most common problems reported among children were:    About half became drowsy after the shot, had a temporary loss of appetite, or had redness or tenderness where the shot was given.  About 1 out of 3 had swelling where the shot was given.  About 1 out of 3 had a mild fever, and about 1 in 20 had a fever over 102.2°F.   Up to about 8 out of 10 became fussy or irritable. Adults have reported pain, redness, and swelling where the shot was given; also mild fever, fatigue, headache, chills, or muscle pain. [employer] children who get PCV13 along with inactivated flu vaccine at the same time may be at increased risk for seizures caused by fever. Ask your doctor for more information. Problems that could happen after any vaccine:     People sometimes faint after a medical procedure, including vaccination.  Sitting or lying down for about 15 minutes can help prevent fainting, and injuries caused by a fall. Tell your doctor if you feel dizzy, or have vision changes or ringing in the ears.  Some older children and adults get severe pain in the shoulder and have difficulty moving the arm where a shot was given. This happens very rarely.  Any medication can cause a severe allergic reaction. Such reactions from a vaccine are very rare, estimated at about 1 in a million doses, and would happen within a few minutes to a few hours after the vaccination. As with any medicine, there is a very small chance of a vaccine causing a serious injury or death. The safety of vaccines is always being monitored. For more information, visit: www.cdc.gov/vaccinesafety/     5. What if there is a serious reaction? What should I look for?  Look for anything that concerns you, such as signs of a severe allergic reaction, very high fever, or unusual behavior. Signs of a severe allergic reaction can include hives, swelling of the face and throat, difficulty breathing, a fast heartbeat, dizziness, and weakness  usually within a few minutes to a few hours after the vaccination. What should I do?  If you think it is a severe allergic reaction or other emergency that cant wait, call 9-1-1 or get the person to the nearest hospital. Otherwise, call your doctor. Reactions should be reported to the Vaccine Adverse Event Reporting System (VAERS). Your doctor should file this report, or you can do it yourself through the VAERS web site at www.vaers. hhs.gov, or by calling 9-794.125.9310. VAERS does not give medical advice. 6. The National Vaccine Injury Compensation Program    The [facility] Vaccine Injury Compensation Program (VICP) is a federal program that was created to compensate people who may have been injured by certain vaccines.     Persons who believe they may have been injured by a vaccine can learn about the program and about filing a claim by calling 1-240.541.9967 or visiting the [address] website at www.[facility].gov/vaccinecompensation. There is a time limit to file a claim for compensation. 7. How can I learn more?  Ask your healthcare provider. He or she can give you the vaccine package insert or suggest other sources of information.  Call your local or state health department.  Contact the Centers for Disease Control and Prevention (CDC):  - Call 3-379.463.5396 (3-759-DLN-INFO) or  - Visit CDCs website at www.cdc.gov/vaccines    Vaccine Information Statement   PCV13 Vaccine   11/5/2015   41 GISEL Noyola [id number]    Department of Health and Human Services  Centers for Disease Control and Prevention    Office Use Only    Medicare Wellness Visit, Female     The best way to live healthy is to have a lifestyle where you eat a well-balanced diet, exercise regularly, limit alcohol use, and quit all forms of tobacco/nicotine, if applicable. Regular preventive services are another way to keep healthy. Preventive services (vaccines, screening tests, monitoring & exams) can help personalize your care plan, which helps you manage your own care. Screening tests can find health problems at the earliest stages, when they are easiest to treat. [facility] follows the current, evidence-based guidelines published by the [place] (USPSTF) when recommending preventive services for our patients. Because we follow these guidelines, sometimes recommendations change over time as research supports it. (For example, mammograms used to be recommended annually. Even though Medicare will still pay for an annual mammogram, the newer guidelines recommend a mammogram every two years for women of average risk.)  Of course, you and your doctor may decide to screen more often for some diseases, based on your risk and your health status.    Preventive services for you include:  - Medicare offers their members a free annual wellness visit, which is time for you and your primary care provider to discuss and plan for your preventive service needs. Take advantage of this benefit every year!  -All adults over the age of 72 should receive the recommended pneumonia vaccines. Current USPSTF guidelines recommend a series of two vaccines for the best pneumonia protection.   -All adults should have a flu vaccine yearly and a tetanus vaccine every 10 years. All adults age 61 and older should receive a shingles vaccine once in their lifetime.    -A bone mass density test is recommended when a woman turns 65 to screen for osteoporosis. This test is only recommended one time, as a screening. Some providers will use this same test as a disease monitoring tool if you already have osteoporosis. -All adults age 38-68 who are overweight should have a diabetes screening test once every three years.   -Other screening tests and preventive services for persons with diabetes include: an eye exam to screen for diabetic retinopathy, a kidney function test, a foot exam, and stricter control over your cholesterol.   -Cardiovascular screening for adults with routine risk involves an electrocardiogram (ECG) at intervals determined by your doctor.   -Colorectal cancer screenings should be done for adults age 54-65 with no increased risk factors for colorectal cancer. There are a number of acceptable methods of screening for this type of cancer. Each test has its own benefits and drawbacks. Discuss with your doctor what is most appropriate for you during your annual wellness visit. The different tests include: colonoscopy (considered the best screening method), a fecal occult blood test, a fecal DNA test, and sigmoidoscopy.   -Breast cancer screenings are recommended every other year for women of normal risk, age 54-69.  -Cervical cancer screenings for women over age 72 are only recommended with certain risk factors.   -All adults born between 80 and 1965 should be screened once for Hepatitis C.     Here is a list of your current Health Maintenance items (your personalized list of preventive services) with a due date:  Health Maintenance Due   Topic Date Due    DTaP/Tdap/Td  (1 - Tdap) 03/05/1970    Shingles Vaccine (1 of 2) 03/05/1999    Glaucoma Screening   03/05/2014    Pneumococcal Vaccine (2 of 2 - PCV13) 11/07/2016    Annual Well Visit  09/28/2018              Learning About Living Vazquez Maxine  What is a living will? A living will is a legal form you use to write down the kind of care you want at the end of your life. It is used by the health professionals who will treat you if you aren't able to decide for yourself. If you put your wishes in writing, your loved ones and others will know what kind of care you want. They won't need to guess. This can ease your mind and be helpful to others. A living will is not the same as an estate or property will. An estate will explains what you want to happen with your money and property after you die. Is a living will a legal document? A living will is a legal document. Each state has its own laws about living baker. If you move to another state, make sure that your living will is legal in the state where you now live. Or you might use a universal form that has been approved by many states. This kind of form can sometimes be completed and stored online. Your electronic copy will then be available wherever you have a connection to the Internet. In most cases, doctors will respect your wishes even if you have a form from a different state. · You don't need an  to complete a living will. But legal advice can be helpful if your state's laws are unclear, your health history is complicated, or your family can't agree on what should be in your living will. · You can change your living will at any time. Some people find that their wishes about end-of-life care change as their health changes.   · In addition to making a living will, think about completing a medical power of  form. This form lets you name the person you want to make end-of-life treatment decisions for you (your \"health care agent\") if you're not able to. Many hospitals and nursing homes will give you the forms you need to complete a living will and a medical power of . · Your living will is used only if you can't make or communicate decisions for yourself anymore. If you become able to make decisions again, you can accept or refuse any treatment, no matter what you wrote in your living will. · Your state may offer an online registry. This is a place where you can store your living will online so the doctors and nurses who need to treat you can find it right away. What should you think about when creating a living will? Talk about your end-of-life wishes with your family members and your doctor. Let them know what you want. That way the people making decisions for you won't be surprised by your choices. Think about these questions as you make your living will:  · Do you know enough about life support methods that might be used? If not, talk to your doctor so you know what might be done if you can't breathe on your own, your heart stops, or you're unable to swallow. · What things would you still want to be able to do after you receive life-support methods? Would you want to be able to walk? To speak? To eat on your own? To live without the help of machines? · If you have a choice, where do you want to be cared for? In your home? At a hospital or nursing home? · Do you want certain Restoration practices performed if you become very ill? · If you have a choice at the end of your life, where would you prefer to die? At home? In a hospital or nursing home? Somewhere else? · Would you prefer to be buried or cremated? · Do you want your organs to be donated after you die? What should you do with your living will?   · Make sure that your family members and your health care agent have copies of your living will. · Give your doctor a copy of your living will to keep in your medical record. If you have more than one doctor, make sure that each one has a copy. · You may want to put a copy of your living will where it can be easily found. Where can you learn more? Go to http://gosia-ailyn.info/. Enter D600 in the search box to learn more about \"Learning About Living Shanelle. \"  Current as of: August 8, 2016  Content Version: 11.3  © 5157-4905 [employer]. Care instructions adapted under license by [employer] (which disclaims liability or warranty for this information). If you have questions about a medical condition or this instruction, always ask your healthcare professional. [address] any warranty or liability for your use of this information. Preventing Falls: Care Instructions  Your Care Instructions    Getting around your home safely can be a challenge if you have injuries or health problems that make it easy for you to fall. Loose rugs and furniture in walkways are among the dangers for many older people who have problems walking or who have poor eyesight. People who have conditions such as arthritis, osteoporosis, or dementia also have to be careful not to fall. You can make your home safer with a few simple measures. Follow-up care is a key part of your treatment and safety. Be sure to make and go to all appointments, and call your doctor if you are having problems. It's also a good idea to know your test results and keep a list of the medicines you take. How can you care for yourself at home? Taking care of yourself  · You may get dizzy if you do not drink enough water. To prevent dehydration, drink plenty of fluids, enough so that your urine is light yellow or clear like water. Choose water and other caffeine-free clear liquids.  If you have kidney, heart, or liver disease and have to limit fluids, talk with your doctor before you increase the amount of fluids you drink. · Exercise regularly to improve your strength, muscle tone, and balance. Walk if you can. Swimming may be a good choice if you cannot walk easily. · Have your vision and hearing checked each year or any time you notice a change. If you have trouble seeing and hearing, you might not be able to avoid objects and could lose your balance. · Know the side effects of the medicines you take. Ask your doctor or pharmacist whether the medicines you take can affect your balance. Sleeping pills or sedatives can affect your balance. · Limit the amount of alcohol you drink. Alcohol can impair your balance and other senses. · Ask your doctor whether calluses or corns on your feet need to be removed. If you wear loose-fitting shoes because of calluses or corns, you can lose your balance and fall. · Talk to your doctor if you have numbness in your feet. Preventing falls at home  · Remove raised doorway thresholds, throw rugs, and clutter. Repair loose carpet or raised areas in the floor. · Move furniture and electrical cords to keep them out of walking paths. · Use nonskid floor wax, and wipe up spills right away, especially on ceramic tile floors. · If you use a walker or cane, put rubber tips on it. If you use crutches, clean the bottoms of them regularly with an abrasive pad, such as steel wool. · Keep your house well lit, especially [facility], and outside walkways. Use night-lights in areas such as hallways and bathrooms. Add extra light switches or use remote switches (such as switches that go on or off when you clap your hands) to make it easier to turn lights on if you have to get up during the night. · Install sturdy handrails on stairways. · Move items in your cabinets so that the things you use a lot are on the lower shelves (about waist level). · Keep a cordless phone and a flashlight with new batteries by your bed.  If possible, put a phone in each of the main rooms of your house, or carry a cell phone in case you fall and cannot reach a phone. Or, you can wear a device around your neck or wrist. You push a button that sends a signal for help. · Wear low-heeled shoes that fit well and give your feet good support. Use footwear with nonskid soles. Check the heels and soles of your shoes for wear. Repair or replace worn heels or soles. · Do not wear socks without shoes on wood floors. · Walk on the grass when the sidewalks are slippery. If you live in an area that gets snow and ice in the winter, sprinkle salt on slippery steps and sidewalks. Preventing falls in the bath  · Install grab bars and nonskid mats inside and outside your shower or tub and near the toilet and sinks. · Use shower chairs and bath benches. · Use a hand-held shower head that will allow you to sit while showering. · Get into a tub or shower by putting the weaker leg in first. Get out of a tub or shower with your strong side first.  · Repair loose toilet seats and consider installing a raised toilet seat to make getting on and off the toilet easier. · Keep your bathroom door unlocked while you are in the shower. Where can you learn more? Go to http://gosia-ailyn.info/. Enter [id number] [id number] in the search box to learn more about \"Preventing Falls: Care Instructions. \"  Current as of: March 16, 2018  Content Version: 11.8  © 3111-4065 [employer]. Care instructions adapted under license by [employer] (which disclaims liability or warranty for this information). If you have questions about a medical condition or this instruction, always ask your healthcare professional. [place] [address] any warranty or liability for your use of this information.     Pneumococcal 13-Valent Vaccine, Diphtheria Conjugate (By injection)   Pneumococcal 13-Valent Vaccine, Diphtheria Conjugate (GXE-xod-QHL-al 13-VAY-lent VAX-een, dif-THEER-ee-a UUB-[id number])  Prevents infections, such as pneumonia and meningitis. Brand Name(s): Prevnar 13   There may be other brand names for this medicine. When This Medicine Should Not Be Used: This vaccine is not right for everyone. You should not receive it if you had an allergic reaction to pneumococcal or diphtheria vaccine. How to Use This Medicine:   Injectable  · A nurse or other health provider will give you this medicine. This vaccine is usually given as a shot into a muscle in the thigh or upper arm. · The vaccine schedule is different for different people. ¨ Tell your doctor if your child was born prematurely. Children who were premature may need to follow a different schedule. ¨ Children younger than 10years of age: This vaccine is usually given as 3 or 4 separate shots over several months. Your child's doctor will tell you how many shots are needed and when to come back for the next one. ¨ Children older than 10years of age: This vaccine is given as a single shot. If your child recently received another pneumonia vaccine, this one should be given at least 8 weeks later. ¨ Adults older than 25years of age: This vaccine is given as a single dose. · It is very important for your child to receive all of the shots for the vaccine. · Missed dose: This vaccine must be given on a fixed schedule. If your child misses a dose, call your child's doctor for another appointment. Drugs and Foods to Avoid:   Ask your doctor or pharmacist before using any other medicine, including over-the-counter medicines, vitamins, and herbal products. · Some foods and medicines can affect how this vaccine works. Tell your doctor if you are receiving a treatment or medicine that causes a weak immune system. This includes radiation treatment, steroid medicine (including hydrocortisone, methylprednisolone, prednisolone, prednisone), or cancer medicine.   Warnings While Using This Medicine:   · Tell your doctor if you are pregnant or breastfeeding. · Tell your doctor if you have a weak immune system. You may not be fully protected by this vaccine. Possible Side Effects While Using This Medicine:   Call your doctor right away if you notice any of these side effects:  · Allergic reaction: Itching or hives, swelling in your face or hands, swelling or tingling in your mouth or throat, chest tightness, trouble breathing  · High fever  If you notice these less serious side effects, talk with your doctor:   · Crying, irritability, or fussiness  · Joint or muscle pain  · Mild skin rash  · Pain, burning, redness, or swelling where the shot was given  · Poor appetite  · Sleep changes  If you notice other side effects that you think are caused by this medicine, tell your doctor. Call your doctor for medical advice about side effects. You may report side effects to FDA at 4-260-FDA-1499  © 2017 [facility] Information is for End User's use only and may not be sold, redistributed or otherwise used for commercial purposes. The above information is an  only. It is not intended as medical advice for individual conditions or treatments. Talk to your doctor, nurse or pharmacist before following any medical regimen to see if it is safe and effective for you.

## 2019-06-05 NOTE — ACP (ADVANCE CARE PLANNING)
Advance Care Planning      Other Legally Authorized Decision Maker would be  Daughter Katrina Rock as SDM     For My patients who has currently great Decision Making Capacity:     Patient is on  presence of family member  stated that the pt wants to be not DNR at this time,  The pt likes to be a full code individual,  The patient states that there is a lot of will to live,  Pt was given the form,   will sign and bring us a copy on the later date.

## 2019-06-05 NOTE — PROGRESS NOTES
This is the Subsequent Medicare Annual Wellness Exam, performed 12 months or more after the Initial AWV or the last Subsequent AWV    I have reviewed the patient's medical history in detail and updated the computerized patient record. History   Present for CPE, last Complete Physical exam was few yrs ,  Up todate w/ all vaccination, last tetanus vaccine was in <5yrs    .      last mammog was nl and In 2017,    last pap smear normal and was in  Few yrs ago .      last colonoscopy was normal and was <8 yrs  Ago,   No past surgical hx,  last bone dexa scan was never     No family hx of breast cancer      no family hx of colon cancer, ++sexaully active and uses [employer],++hysically active,  compliant w/ meds, Rf needed for today for her meds. No fall not depressed    HIP  pain  The history is provided by the patient. This is a chronic problem. Episode onset:3yrs ago, pt's MBI is at 27.5 pt  is working at this time but is cumbersome for the patient, to do any mopping, heavy pushing and lifting, , pt is currently able to walk w/out any mobility device. and the patient states that the pain is worsening specially by going up and down the steps . The problem occurs constantly. The problem has changed and worsening since onset. The pain is present in the RT Hip area. is a dull pain and  is at a severity of 6/10. the pt has the AM stiffness, but denies numbness and tingling sensations  There has been no history of extremity trauma.  no incontinence of urine nor of stool, and no lower ext Weaknesses            Past Medical History:   Diagnosis Date    Contact dermatitis and other eczema, due to unspecified cause     Hot flashes, menopausal 4/21/2010    HTN (hypertension) 3/29/2010    Insomnia 3/29/2010    Knee pain, right 6/4/2015    Osteopenia 4/21/2010    Prediabetes 6/8/2012    Recurrent sinusitis 3/7/2016    Screening for osteoporosis 3/7/2016      Past Surgical History:   Procedure Laterality Date    ENDOSCOPY, COLON, DIAGNOSTIC      in 2009    HX BREAST BIOPSY Left 1970    benign:  no visible scar    HX HYSTERECTOMY       Current Outpatient Medications   Medication Sig Dispense Refill    simvastatin (ZOCOR) 10 mg tablet TAKE ONE TABLET BY MOUTH ONCE NIGHTLY 90 Tab 2    venlafaxine (EFFEXOR) 75 mg tablet TAKE ONE TABLET BY MOUTH DAILY 30 Tab 2    hydroCHLOROthiazide (HYDRODIURIL) 25 mg tablet TAKE ONE TABLET BY MOUTH DAILY 90 Tab 2    chlorpheniramine-HYDROcodone (TUSSIONEX) 10-8 mg/5 mL suspension Take 5 mL by mouth every twelve (12) hours as needed for Cough. Max Daily Amount: 10 mL. 90 mL 0    aspirin 81 mg chewable tablet Take 1 Tab by mouth daily. 30 Tab 11    albuterol (PROVENTIL HFA, VENTOLIN HFA, PROAIR HFA) 90 mcg/actuation inhaler Take 1 Puff by inhalation every four (4) hours as needed for Wheezing. 1 Inhaler 1    calcium-cholecalciferol, D3, (CALTRATE 600+D) tablet Take 1 Tab by mouth daily.  27 Tab 0     No Known Allergies  Family History   Problem Relation Age of Onset    Stroke Mother     Hypertension Sister     Hypertension Brother     Hypertension Sister     Hypertension Brother      Social History     Tobacco Use    Smoking status: Never Smoker    Smokeless tobacco: Never Used    Tobacco comment: quit 30 yrs ago   Substance Use Topics    Alcohol use: No     Comment: occation     Patient Active Problem List   Diagnosis Code    HTN (hypertension) I10    Insomnia G47.00    Hot flashes, menopausal N95.1    Osteopenia M85.80    Cellulitis L03.90    Candida vaginitis B37.3    Vitamin D deficiency E55.9    Hypercholesterolemia E78.00    Prediabetes R73.03    Rash, skin R21    S/P partial hysterectomy Z90.711    Need for varicella vaccine Z23    Knee pain, right M25.561    Screening for osteoporosis Z13.820    Recurrent sinusitis J32.9       Depression Risk Factor Screening:     3 most recent PHQ Screens 6/5/2019   Little interest or pleasure in doing things Not at all Feeling down, depressed, irritable, or hopeless Not at all   Total Score PHQ 2 0     Alcohol Risk Factor Screening: You do not drink alcohol or very rarely. Functional Ability and Level of Safety:   Hearing Loss  Hearing is good. Activities of Daily Living  The home contains: handrails, grab bars and rugs  Patient does total self care    Fall Risk  Fall Risk Assessment, last 12 mths 12/26/2018   Able to walk? Yes   Fall in past 12 months? No       Abuse Screen  Patient is not abused    Cognitive Screening   Evaluation of Cognitive Function:  Has your family/caregiver stated any concerns about your memory: no  Normal    Patient Care Team   Patient Care Team:  Gita Bahena MD as PCP - General (Family Practice)    Assessment/Plan   Education and counseling provided:  Are appropriate based on today's review and evaluation  End-of-Life planning (with patient's consent)  Pneumococcal Vaccine  Colorectal cancer screening tests    Diagnoses and all orders for this visit:    1. Medicare annual wellness visit, subsequent    2. Encounter for immunization  -     PNEUMOCOCCAL CONJ VACCINE 13 VALENT IM  -     varicella-zoster recombinant, PF, (SHINGRIX) 50 mcg/0.5 mL susr injection; 0.5 mL by IntraMUSCular route once for 1 dose. -     ADMIN PNEUMOCOCCAL VACCINE  -     TETANUS, DIPHTHERIA TOXOIDS AND ACELLULAR PERTUSSIS VACCINE (TDAP), IN INDIVIDS. >=7, IM    3. Screen for colon cancer  -     COLOGUARD TEST (FECAL DNA COLORECTAL CANCER SCREENING)  -     REFERRAL FOR COLONOSCOPY  -     COLLECTION VENOUS BLOOD,VENIPUNCTURE    4. Screening for glaucoma  -     REFERRAL TO OPTOMETRY  -     COLLECTION VENOUS BLOOD,VENIPUNCTURE    5. Advanced directives, counseling/discussion  -     ADVANCE CARE PLANNING FIRST 30 MINS  -     FULL CODE  -     COLLECTION VENOUS BLOOD,VENIPUNCTURE    6. Screening for alcoholism  -     DE ANNUAL ALCOHOL SCREEN 15 MIN  -     COLLECTION VENOUS BLOOD,VENIPUNCTURE    7.  Screening for depression  -     DEPRESSION SCREEN ANNUAL  -     COLLECTION VENOUS BLOOD,VENIPUNCTURE    8. Disorder of bone and cartilage  -     DEXA BONE DENSITY STUDY AXIAL; Future  -     COLLECTION VENOUS BLOOD,VENIPUNCTURE    9. Encounter for screening mammogram for malignant neoplasm of breast  -     JOSE MAMMO BI SCREENING INCL CAD; Future  -     COLLECTION VENOUS BLOOD,VENIPUNCTURE    10. Essential hypertension  -     ADVANCE CARE PLANNING FIRST 30 MINS  -     FULL CODE  -     [place] ANNUAL ALCOHOL SCREEN 15 MIN  -     DEPRESSION SCREEN ANNUAL  -     REFERRAL FOR COLONOSCOPY  -     DEXA BONE DENSITY STUDY AXIAL; Future  -     JOSE MAMMO BI SCREENING INCL CAD; Future  -     CBC W/O DIFF  -     METABOLIC PANEL, COMPREHENSIVE  -     LIPID PANEL  -     TSH 3RD GENERATION  -     VITAMIN D, 25 HYDROXY  -     VITAMIN B12 & FOLATE  -     COLLECTION VENOUS BLOOD,VENIPUNCTURE    11. Hypercholesterolemia  -     ADVANCE CARE PLANNING FIRST 30 MINS  -     FULL CODE  -     [place] ANNUAL ALCOHOL SCREEN 15 MIN  -     DEPRESSION SCREEN ANNUAL  -     REFERRAL FOR COLONOSCOPY  -     DEXA BONE DENSITY STUDY AXIAL; Future  -     JOSE MAMMO BI SCREENING INCL CAD; Future  -     CBC W/O DIFF  -     METABOLIC PANEL, COMPREHENSIVE  -     LIPID PANEL  -     TSH 3RD GENERATION  -     VITAMIN D, 25 HYDROXY  -     VITAMIN B12 & FOLATE  -     COLLECTION VENOUS BLOOD,VENIPUNCTURE    12. Vitamin D deficiency  -     ADVANCE CARE PLANNING FIRST 30 MINS  -     FULL CODE  -     [place] ANNUAL ALCOHOL SCREEN 15 MIN  -     DEPRESSION SCREEN ANNUAL  -     REFERRAL FOR COLONOSCOPY  -     DEXA BONE DENSITY STUDY AXIAL; Future  -     JOSE MAMMO BI SCREENING INCL CAD; Future  -     CBC W/O DIFF  -     METABOLIC PANEL, COMPREHENSIVE  -     LIPID PANEL  -     TSH 3RD GENERATION  -     VITAMIN D, 25 HYDROXY  -     VITAMIN B12 & FOLATE  -     COLLECTION VENOUS BLOOD,VENIPUNCTURE    13.  Osteopenia, unspecified location  -     ADVANCE CARE PLANNING FIRST 30 MINS  -     FULL CODE  -     [place] ANNUAL ALCOHOL SCREEN 15 MIN  -     DEPRESSION SCREEN ANNUAL  -     REFERRAL FOR COLONOSCOPY  -     DEXA BONE DENSITY STUDY AXIAL; Future  -     [facility] MAMMO BI SCREENING INCL CAD; Future  -     CBC W/O DIFF  -     METABOLIC PANEL, COMPREHENSIVE  -     LIPID PANEL  -     TSH 3RD GENERATION  -     VITAMIN D, 25 HYDROXY  -     VITAMIN B12 & FOLATE  -     COLLECTION VENOUS BLOOD,VENIPUNCTURE    Other orders  -     acetaminophen (TYLENOL 8 HOUR) 650 mg TbER; Take 1 Tab by mouth every six (6) hours as needed for Pain. [address] education and counseling provided:  Age appropriate evidence-based preventive care recommendations based on today's review and evaluation; including relevant cancer screening guidelines, and vaccination recommendations. An After Visit Summary was printed and given to the patient with information about these guidelines, and a personalized schedule for health maintenance items. When appropriate and with patient agreement, orders noted below were placed to complete missing health maintenance items.       Health Maintenance Due   Topic Date Due    DTaP/Tdap/Td series (1 - Tdap) 03/05/1970    Shingrix Vaccine Age 50> (1 of 2) 03/05/1999    GLAUCOMA SCREENING Q2Y  03/05/2014    Pneumococcal 65+ years (2 of 2 - PCV13) 11/07/2016    MEDICARE YEARLY EXAM  09/28/2018

## 2019-06-06 LAB
25(OH)D3+25(OH)D2 SERPL-MCNC: 22.4 NG/ML (ref 30–100)
ALBUMIN SERPL-MCNC: 4.4 G/DL (ref 3.5–4.8)
ALBUMIN/GLOB SERPL: 1.4 {RATIO} (ref 1.2–2.2)
ALP SERPL-CCNC: 70 IU/L (ref 39–117)
ALT SERPL-CCNC: 11 IU/L (ref 0–32)
AST SERPL-CCNC: 17 IU/L (ref 0–40)
BILIRUB SERPL-MCNC: 0.3 MG/DL (ref 0–1.2)
BUN SERPL-MCNC: 13 MG/DL (ref 8–27)
BUN/CREAT SERPL: 22 (ref 12–28)
CALCIUM SERPL-MCNC: 9.3 MG/DL (ref 8.7–10.3)
CHLORIDE SERPL-SCNC: 103 MMOL/L (ref 96–106)
CHOLEST SERPL-MCNC: 184 MG/DL (ref 100–199)
CO2 SERPL-SCNC: 27 MMOL/L (ref 20–29)
CREAT SERPL-MCNC: 0.59 MG/DL (ref 0.57–1)
ERYTHROCYTE [DISTWIDTH] IN BLOOD BY AUTOMATED COUNT: 16.4 % (ref 12.3–15.4)
FOLATE SERPL-MCNC: 13.3 NG/ML
GLOBULIN SER CALC-MCNC: 3.1 G/DL (ref 1.5–4.5)
GLUCOSE SERPL-MCNC: 90 MG/DL (ref 65–99)
HCT VFR BLD AUTO: 39.6 % (ref 34–46.6)
HDLC SERPL-MCNC: 50 MG/DL
HGB BLD-MCNC: 13.1 G/DL (ref 11.1–15.9)
LDLC SERPL CALC-MCNC: 110 MG/DL (ref 0–99)
MCH RBC QN AUTO: 25.6 PG (ref 26.6–33)
MCHC RBC AUTO-ENTMCNC: 33.1 G/DL (ref 31.5–35.7)
MCV RBC AUTO: 77 FL (ref 79–97)
PLATELET # BLD AUTO: 298 X10E3/UL (ref 150–450)
POTASSIUM SERPL-SCNC: 3.9 MMOL/L (ref 3.5–5.2)
PROT SERPL-MCNC: 7.5 G/DL (ref 6–8.5)
RBC # BLD AUTO: 5.12 X10E6/UL (ref 3.77–5.28)
SODIUM SERPL-SCNC: 144 MMOL/L (ref 134–144)
TRIGL SERPL-MCNC: 119 MG/DL (ref 0–149)
TSH SERPL DL<=0.005 MIU/L-ACNC: 1.83 UIU/ML (ref 0.45–4.5)
VIT B12 SERPL-MCNC: 391 PG/ML (ref 232–1245)
VLDLC SERPL CALC-MCNC: 24 MG/DL (ref 5–40)
WBC # BLD AUTO: 8.5 X10E3/UL (ref 3.4–10.8)

## 2019-07-25 ENCOUNTER — HOSPITAL ENCOUNTER (OUTPATIENT)
Dept: MAMMOGRAPHY | Age: 69
Discharge: HOME | End: 2019-07-25
Attending: FAMILY MEDICINE
Payer: MEDICARE

## 2019-07-25 ENCOUNTER — HOSPITAL ENCOUNTER (OUTPATIENT)
Dept: BONE DENSITY | Age: 69
Discharge: HOME | End: 2019-07-25
Attending: FAMILY MEDICINE
Payer: MEDICARE

## 2019-07-25 DIAGNOSIS — Z12.31 ENCOUNTER FOR SCREENING MAMMOGRAM FOR MALIGNANT NEOPLASM OF BREAST: ICD-10-CM

## 2019-07-25 DIAGNOSIS — E55.9 VITAMIN D DEFICIENCY: ICD-10-CM

## 2019-07-25 DIAGNOSIS — M94.9 DISORDER OF BONE AND CARTILAGE: ICD-10-CM

## 2019-07-25 DIAGNOSIS — M85.80 OSTEOPENIA, UNSPECIFIED LOCATION: ICD-10-CM

## 2019-07-25 DIAGNOSIS — M89.9 DISORDER OF BONE AND CARTILAGE: ICD-10-CM

## 2019-07-25 PROCEDURE — 77080 DXA BONE DENSITY AXIAL: CPT

## 2019-07-25 PROCEDURE — 77067 SCR MAMMO BI INCL CAD: CPT

## 2019-10-19 DIAGNOSIS — R73.03 PREDIABETES: ICD-10-CM

## 2019-10-19 DIAGNOSIS — G47.00 INSOMNIA, UNSPECIFIED TYPE: ICD-10-CM

## 2019-10-19 DIAGNOSIS — I10 ESSENTIAL HYPERTENSION: ICD-10-CM

## 2019-10-19 DIAGNOSIS — E78.00 HYPERCHOLESTEROLEMIA: ICD-10-CM

## 2019-10-19 DIAGNOSIS — N95.1 HOT FLASHES, MENOPAUSAL: ICD-10-CM

## 2019-10-21 RX ORDER — VENLAFAXINE 75 MG/1
TABLET ORAL
Qty: 30 TAB | Refills: 1 | Status: SHIPPED | OUTPATIENT
Start: 2019-10-21 | End: 2020-02-24

## 2020-01-03 DIAGNOSIS — R73.03 PREDIABETES: ICD-10-CM

## 2020-01-03 DIAGNOSIS — N95.1 HOT FLASHES, MENOPAUSAL: ICD-10-CM

## 2020-01-03 DIAGNOSIS — E78.00 HYPERCHOLESTEROLEMIA: ICD-10-CM

## 2020-01-03 DIAGNOSIS — G47.00 INSOMNIA, UNSPECIFIED TYPE: ICD-10-CM

## 2020-01-03 DIAGNOSIS — I10 ESSENTIAL HYPERTENSION: ICD-10-CM

## 2020-01-03 RX ORDER — HYDROCHLOROTHIAZIDE 25 MG/1
TABLET ORAL
Qty: 90 TAB | Refills: 1 | Status: SHIPPED | OUTPATIENT
Start: 2020-01-03 | End: 2020-06-29

## 2020-01-24 ENCOUNTER — HOSPITAL ENCOUNTER (OUTPATIENT)
Age: 70
Setting detail: OUTPATIENT SURGERY
Discharge: HOME | End: 2020-01-24
Attending: INTERNAL MEDICINE | Admitting: INTERNAL MEDICINE
Payer: MEDICARE

## 2020-01-24 ENCOUNTER — ANESTHESIA (OUTPATIENT)
Dept: ENDOSCOPY | Age: 70
End: 2020-01-24
Payer: MEDICARE

## 2020-01-24 ENCOUNTER — ANESTHESIA EVENT (OUTPATIENT)
Dept: ENDOSCOPY | Age: 70
End: 2020-01-24
Payer: MEDICARE

## 2020-01-24 VITALS
DIASTOLIC BLOOD PRESSURE: 74 MMHG | HEIGHT: 63 IN | TEMPERATURE: 97.2 F | RESPIRATION RATE: 14 BRPM | SYSTOLIC BLOOD PRESSURE: 140 MMHG | WEIGHT: 157 LBS | OXYGEN SATURATION: 96 % | HEART RATE: 74 BPM | BODY MASS INDEX: 27.82 KG/M2

## 2020-01-24 PROCEDURE — 76060000031 HC ANESTHESIA FIRST 0.5 HR: Performed by: INTERNAL MEDICINE

## 2020-01-24 PROCEDURE — 74011000250 HC RX REV CODE- 250: Performed by: NURSE ANESTHETIST, CERTIFIED REGISTERED

## 2020-01-24 PROCEDURE — 76040000019: Performed by: INTERNAL MEDICINE

## 2020-01-24 PROCEDURE — 77030019988 HC FCPS ENDOSC DISP BSC -B: Performed by: INTERNAL MEDICINE

## 2020-01-24 PROCEDURE — 74011250636 HC RX REV CODE- 250/636: Performed by: INTERNAL MEDICINE

## 2020-01-24 PROCEDURE — 74011250636 HC RX REV CODE- 250/636: Performed by: NURSE ANESTHETIST, CERTIFIED REGISTERED

## 2020-01-24 PROCEDURE — 88305 TISSUE EXAM BY PATHOLOGIST: CPT

## 2020-01-24 RX ORDER — MIDAZOLAM HYDROCHLORIDE 1 MG/ML
.25-5 INJECTION, SOLUTION INTRAMUSCULAR; INTRAVENOUS
Status: DISCONTINUED | OUTPATIENT
Start: 2020-01-24 | End: 2020-01-24 | Stop reason: HOSPADM

## 2020-01-24 RX ORDER — FLUMAZENIL 0.1 MG/ML
0.2 INJECTION INTRAVENOUS
Status: DISCONTINUED | OUTPATIENT
Start: 2020-01-24 | End: 2020-01-24 | Stop reason: HOSPADM

## 2020-01-24 RX ORDER — PROPOFOL 10 MG/ML
INJECTION, EMULSION INTRAVENOUS AS NEEDED
Status: DISCONTINUED | OUTPATIENT
Start: 2020-01-24 | End: 2020-01-24 | Stop reason: HOSPADM

## 2020-01-24 RX ORDER — NALOXONE HYDROCHLORIDE 0.4 MG/ML
0.4 INJECTION, SOLUTION INTRAMUSCULAR; INTRAVENOUS; SUBCUTANEOUS
Status: DISCONTINUED | OUTPATIENT
Start: 2020-01-24 | End: 2020-01-24 | Stop reason: HOSPADM

## 2020-01-24 RX ORDER — LIDOCAINE HYDROCHLORIDE 20 MG/ML
INJECTION, SOLUTION EPIDURAL; INFILTRATION; INTRACAUDAL; PERINEURAL AS NEEDED
Status: DISCONTINUED | OUTPATIENT
Start: 2020-01-24 | End: 2020-01-24 | Stop reason: HOSPADM

## 2020-01-24 RX ORDER — DEXTROMETHORPHAN/PSEUDOEPHED 2.5-7.5/.8
1.2 DROPS ORAL
Status: DISCONTINUED | OUTPATIENT
Start: 2020-01-24 | End: 2020-01-24 | Stop reason: HOSPADM

## 2020-01-24 RX ORDER — SODIUM CHLORIDE 0.9 % (FLUSH) 0.9 %
5-40 SYRINGE (ML) INJECTION EVERY 8 HOURS
Status: DISCONTINUED | OUTPATIENT
Start: 2020-01-24 | End: 2020-01-24 | Stop reason: HOSPADM

## 2020-01-24 RX ORDER — SODIUM CHLORIDE 0.9 % (FLUSH) 0.9 %
5-40 SYRINGE (ML) INJECTION AS NEEDED
Status: DISCONTINUED | OUTPATIENT
Start: 2020-01-24 | End: 2020-01-24 | Stop reason: HOSPADM

## 2020-01-24 RX ORDER — EPINEPHRINE 0.1 MG/ML
1 INJECTION INTRACARDIAC; INTRAVENOUS
Status: DISCONTINUED | OUTPATIENT
Start: 2020-01-24 | End: 2020-01-24 | Stop reason: HOSPADM

## 2020-01-24 RX ORDER — SODIUM CHLORIDE 9 MG/ML
75 INJECTION, SOLUTION INTRAVENOUS CONTINUOUS
Status: DISCONTINUED | OUTPATIENT
Start: 2020-01-24 | End: 2020-01-24 | Stop reason: HOSPADM

## 2020-01-24 RX ORDER — ATROPINE SULFATE 0.1 MG/ML
0.5 INJECTION INTRAVENOUS
Status: DISCONTINUED | OUTPATIENT
Start: 2020-01-24 | End: 2020-01-24 | Stop reason: HOSPADM

## 2020-01-24 RX ORDER — FENTANYL CITRATE 50 UG/ML
50 INJECTION, SOLUTION INTRAMUSCULAR; INTRAVENOUS
Status: DISCONTINUED | OUTPATIENT
Start: 2020-01-24 | End: 2020-01-24 | Stop reason: HOSPADM

## 2020-01-24 RX ADMIN — PROPOFOL 100 MG: 10 INJECTION, EMULSION INTRAVENOUS at 12:03

## 2020-01-24 RX ADMIN — PROPOFOL 50 MG: 10 INJECTION, EMULSION INTRAVENOUS at 12:12

## 2020-01-24 RX ADMIN — LIDOCAINE HYDROCHLORIDE 100 MG: 20 INJECTION, SOLUTION EPIDURAL; INFILTRATION; INTRACAUDAL; PERINEURAL at 12:02

## 2020-01-24 RX ADMIN — PROPOFOL 50 MG: 10 INJECTION, EMULSION INTRAVENOUS at 12:06

## 2020-01-24 RX ADMIN — PROPOFOL 50 MG: 10 INJECTION, EMULSION INTRAVENOUS at 12:16

## 2020-01-24 RX ADMIN — PROPOFOL 50 MG: 10 INJECTION, EMULSION INTRAVENOUS at 12:09

## 2020-01-24 RX ADMIN — SODIUM CHLORIDE: 900 INJECTION, SOLUTION INTRAVENOUS at 11:56

## 2020-01-24 NOTE — PROCEDURES
NAME:  Dion Horton   :   1950   MRN:   [id number]     Date/Time:  2020 12:08 PM    Esophagogastroduodenoscopy (EGD) Procedure Note    Procedure: Esophagogastroduodenoscopy with biopsy    Indication:  GERD  Pre-operative Diagnosis: see indication above  Post-operative Diagnosis: see findings below  :  Manjit Kline MD  Referring Provider:   --Mortimer Mouse, MD    Exam:  Airway: clear, no airway problems anticipated  Heart: RRR, without gallops or rubs  Lungs: clear bilaterally without wheezes, crackles, or rhonchi  Abdomen: soft, nontender, nondistended, bowel sounds present  Mental Status: awake, alert and oriented to person, place and time     Anethesia/Sedation:  MAC anesthesia Propofol  Procedure Details   After informed consent was obtained for the procedure, with all risks and benefits of procedure explained the patient was taken to the endoscopy suite and placed in the left lateral decubitus position. Following sequential administration of sedation as per above, the [id number] gastroscope was inserted into the mouth and advanced under direct vision to second portion of the duodenum. A careful inspection was made as the gastroscope was withdrawn, including a retroflexed view of the proximal stomach; findings and interventions are described below. Findings:  1. Normal proximal and mid esophagus  2. Normal Z-line  3. Small, sliding hiatal hernia  4. Mild, patchy, non-erosive gastropathy in body and antrum. Biopsied  5. Stomach otherwise normal, including retroflexion  6. Normal duodenal bulb and 2nd portion of the duodenum    Therapies:  1. Biopsies    Specimens: 1. Gastric    EBL:  None. Complications:   None; patient tolerated the procedure well. Impression:    1. Normal proximal and mid esophagus  2. Normal Z-line  3. Small, sliding hiatal hernia  4. Mild, patchy, non-erosive gastropathy in body and antrum. Biopsied  5.  Stomach otherwise normal, including retroflexion  6. Normal duodenal bulb and 2nd portion of the duodenum    Recommendations:  1.  Follow up pathology    Discharge disposition:  For colonoscopy    Sara Cross MD

## 2020-01-24 NOTE — ANESTHESIA PREPROCEDURE EVALUATION
Relevant Problems   No relevant active problems       Anesthetic History   No history of anesthetic complications            Review of Systems / Medical History  Patient summary reviewed, nursing notes reviewed and pertinent labs reviewed    Pulmonary  Within defined limits                 Neuro/Psych   Within defined limits           Cardiovascular  Within defined limits  Hypertension              Exercise tolerance: >4 METS     GI/Hepatic/Renal  Within defined limits              Endo/Other  Within defined limits           Other Findings              Physical Exam    Airway  Mallampati: II  TM Distance: 4 - 6 cm  Neck ROM: normal range of motion   Mouth opening: Normal     Cardiovascular  Regular rate and rhythm,  S1 and S2 normal,  no murmur, click, rub, or gallop             Dental  No notable dental hx       Pulmonary  Breath sounds clear to auscultation               Abdominal  GI exam deferred       Other Findings            Anesthetic Plan    ASA: 2  Anesthesia type: general and total IV anesthesia          Induction: Intravenous  Anesthetic plan and risks discussed with: Patient      Propofol MAC

## 2020-01-24 NOTE — H&P
Gastroenterology Outpatient History and Physical    Patient: Polly Juan    Physician: James Maloney MD    Chief Complaint: GERD/+Cologuard  History of Present Illness: No other complaints    History:  Past Medical History:   Diagnosis Date    Contact dermatitis and other eczema, due to unspecified cause     Hot flashes, menopausal 4/21/2010    HTN (hypertension) 3/29/2010    Insomnia 3/29/2010    Knee pain, right 6/4/2015    Osteopenia 4/21/2010    Prediabetes 6/8/2012    Recurrent sinusitis 3/7/2016    Screening for osteoporosis 3/7/2016      Past Surgical History:   Procedure Laterality Date    ENDOSCOPY, COLON, DIAGNOSTIC      in 2009    HX BREAST BIOPSY Left 1970    benign:  no visible scar    HX HYSTERECTOMY        Social History     Socioeconomic History    Marital status:      Spouse name: Not on file    Number of children: Not on file    Years of education: Not on file    Highest education level: Not on file   Tobacco Use    Smoking status: Never Smoker    Smokeless tobacco: Never Used    Tobacco comment: quit 30 yrs ago   Substance and Sexual Activity    Alcohol use: No     Comment: occation    Drug use: No    Sexual activity: Yes     Partners: Male      Family History   Problem Relation Age of Onset    Stroke Mother     Hypertension Sister     Hypertension Brother     Hypertension Sister     Hypertension Brother       Patient Active Problem List   Diagnosis Code    HTN (hypertension) I10    Insomnia G47.00    Hot flashes, menopausal N95.1    Osteopenia M85.80    Cellulitis L03.90    Candida vaginitis B37.3    Vitamin D deficiency E55.9    Hypercholesterolemia E78.00    Prediabetes R73.03    Rash, skin R21    S/P partial hysterectomy Z90.711    Knee pain, right M25.561    Recurrent sinusitis J32.9       Allergies: No Known Allergies  Medications:   Prior to Admission medications    Medication Sig Start Date End Date Taking?  Authorizing Provider hydroCHLOROthiazide (HYDRODIURIL) 25 mg tablet TAKE ONE TABLET BY MOUTH DAILY 1/3/20  Yes Gita Bahena MD   venlafaxine (EFFEXOR) 75 mg tablet TAKE ONE TABLET BY MOUTH DAILY 10/21/19  Yes Gita Bahena MD   acetaminophen (TYLENOL 8 HOUR) 650 mg TbER Take 1 Tab by mouth every six (6) hours as needed for Pain. 6/5/19  Yes Gita Bahena MD   simvastatin (ZOCOR) 10 mg tablet TAKE ONE TABLET BY MOUTH ONCE NIGHTLY 5/16/19  Yes Gita Bahena MD   chlorpheniramine-HYDROcodone (TUSSIONEX) 10-8 mg/5 mL suspension Take 5 mL by mouth every twelve (12) hours as needed for Cough. Max Daily Amount: 10 mL. 12/26/18  Yes Gita Bahena MD   aspirin 81 mg chewable tablet Take 1 Tab by mouth daily. 9/11/13  Yes Gita Bahena MD   albuterol (PROVENTIL HFA, VENTOLIN HFA, PROAIR HFA) 90 mcg/actuation inhaler Take 1 Puff by inhalation every four (4) hours as needed for Wheezing. 12/26/18   Gita Bahena MD   calcium-cholecalciferol, D3, (CALTRATE 600+D) tablet Take 1 Tab by mouth daily. 11/1/17   Man Banuelos NP     Physical Exam:   Vital Signs: Blood pressure 158/86, pulse 88, temperature 98 °F (36.7 °C), resp. rate 22, height 5' 3\" (1.6 m), weight 71.2 kg (157 lb), SpO2 100 %, not currently breastfeeding.   General: well developed, well nourished   HEENT: unremarkable   Heart: regular rhythm no mumur    Lungs: clear   Abdominal:  benign   Neurological: unremarkable   Extremities: no edema     Findings/Diagnosis: AP/+Cologuard  Plan of Care/Planned Procedure: EGD/Colon with conscious/deep sedation    Signed:  Orlin Velazquez MD 1/24/2020

## 2020-01-24 NOTE — ROUTINE PROCESS
Sophie Windham 1950 
[id number] Situation: 
Verbal report received from: Sarah Haddadjohn Procedure: Procedure(s): ESOPHAGOGASTRODUODENOSCOPY (EGD) COLONOSCOPY 
ESOPHAGOGASTRODUODENAL (EGD) BIOPSY Background: 
 
Preoperative diagnosis: ACID REFLUX, HEME POSITIVE STOOL Postoperative diagnosis: hiatal hernia, gastritis, diverticulosis, hemorrhoids, poor prep :  Dr. Marilyn Love Assistant(s): Endoscopy Technician-1: Beauty Goodell Endoscopy RN-1: Fabiana Baez RN Specimens:  
ID Type Source Tests Collected by Time Destination 1 : gastric bx Preservative Gastric  Pao Prince MD 1/24/2020 1204 Pathology H. Pylori  no Assessment: 
Intra-procedure medications Anesthesia gave intra-procedure sedation and medications, see anesthesia flow sheet yes Intravenous fluids: NS@ Merly Glaze Vital signs stable Abdominal assessment: round and soft Recommendation: 
Discharge patient per MD order. Return to floor Family or Friend Permission to share finding with family or friend yes

## 2020-01-24 NOTE — DISCHARGE INSTRUCTIONS
Chioma Amaya  [id number]  1950    COLON DISCHARGE INSTRUCTIONS  Discomfort:  Redness at IV site- apply warm compress to area; if redness or soreness persist- contact your physician  There may be a slight amount of blood passed from the rectum  Gaseous discomfort- walking, belching will help relieve any discomfort  You may not operate a vehicle for 12 hours  You may not engage in an occupation involving machinery or appliances for rest of today  You may not drink alcoholic beverages for at least 12 hours  Avoid making any critical decisions for at least 24 hour  DIET:   Regular diet. - however -  remember your colon is empty and a heavy meal will produce gas. Avoid these foods:  vegetables, fried / greasy foods, carbonated drinks for today  MEDICATION:  Per Medication Reconciliation       ACTIVITY:  You may not resume your normal daily activities until tomorrow AM; it is recommended that you spend the remainder of the day resting -  avoid any strenuous activity. CALL M.D. ANY SIGN OF:   Increasing pain, nausea, vomiting  Abdominal distension (swelling)  New increased bleeding (oral or rectal)  Fever (chills)  Pain in chest area  Bloody discharge from nose or mouth  Shortness of breath    You may  take any Advil, Aspirin, Ibuprofen, Motrin, Aleve, or Goodys for 10 days, ONLY  Tylenol as needed for pain. IMPRESSION:  EGD:  Impression:    1. Normal proximal and mid esophagus  2. Normal Z-line  3. Small, sliding hiatal hernia  4. Mild, patchy, non-erosive gastropathy in body and antrum. Biopsied  5. Stomach otherwise normal, including retroflexion  6. Normal duodenal bulb and 2nd portion of the duodenum     Recommendations:  1. Follow up pathology      Colon:  Impression:    1. Moderate sigmoid diverticulosis  2. Medium sized internal hemorrhoids seen on retroflexion  3. Otherwise normal colonoscopy through to the cecum.  Note the bowel preparation was very suboptimal and flat lesions and lesions <15 mm could have been missed    Recommendations:   1.  Repeat colonoscopy in 6-12 months with extended bowel preparation      Follow-up Instructions:   Call Dr. Alla Shearer for the results of procedure / biopsy in 7-10 days  Telephone #[phone]      Belem Stern MD

## 2020-01-24 NOTE — ANESTHESIA POSTPROCEDURE EVALUATION
Procedure(s):  ESOPHAGOGASTRODUODENOSCOPY (EGD)  COLONOSCOPY  ESOPHAGOGASTRODUODENAL (EGD) BIOPSY. general, total IV anesthesia    Anesthesia Post Evaluation        Patient location during evaluation: PACU  Note status: Adequate. Level of consciousness: responsive to verbal stimuli and sleepy but conscious  Pain management: satisfactory to patient  Airway patency: patent  Anesthetic complications: no  Cardiovascular status: acceptable  Respiratory status: acceptable  Hydration status: acceptable  Comments: +Post-Anesthesia Evaluation and Assessment    Patient: No Millard MRN: [id number]  SSN: [id number]   YOB: 1950  Age: 78 y.o. Sex: female      Cardiovascular Function/Vital Signs    /74   Pulse 74   Temp 36.2 °C (97.2 °F)   Resp 14   Ht 5' 3\" (1.6 m)   Wt 71.2 kg (157 lb)   SpO2 96%   Breastfeeding No   BMI 27.81 kg/m²     Patient is status post Procedure(s):  ESOPHAGOGASTRODUODENOSCOPY (EGD)  COLONOSCOPY  ESOPHAGOGASTRODUODENAL (EGD) BIOPSY. Nausea/Vomiting: Controlled. Postoperative hydration reviewed and adequate. Pain:  Pain Scale 1: Visual (01/24/20 1224)  Pain Intensity 1: 0 (01/24/20 1224)   Managed. Neurological Status: At baseline. Mental Status and Level of Consciousness: Arousable. Pulmonary Status:   O2 Device: Room air (01/24/20 1234)   Adequate oxygenation and airway patent. Complications related to anesthesia: None    Post-anesthesia assessment completed. No concerns. Signed By: Isaura Lyon MD    1/24/2020  Post anesthesia nausea and vomiting:  controlled      Vitals Value Taken Time   /81 1/24/2020 12:49 PM   Temp 36.2 °C (97.2 °F) 1/24/2020 12:34 PM   Pulse 0 1/24/2020 12:51 PM   Resp 0 1/24/2020 12:51 PM   SpO2 0 % 1/24/2020 12:50 PM   Vitals shown include unvalidated device data.

## 2020-01-24 NOTE — PROGRESS NOTES
Endoscope was pre-cleaned at bedside immediately following procedure by Arianna MORGAN Anesthesia reports 300mg Propofol, 100mg Lidocaine and 600mL NS given during procedure. Received report from anesthesia staff on vital signs and status of patient.

## 2020-01-24 NOTE — PROCEDURES
NAME:  No Millard   :   1950   MRN:   [id number]     Date/Time:  2020 12:21 PM    Colonoscopy Operative Report    Procedure Type:   Colonoscopy --diagnostic     Indications:     Occult blood in stool (+Cologuard)  Pre-operative Diagnosis: see indication above  Post-operative Diagnosis:  See findings below  :  Chelsie Duque MD  Referring Provider: --Chey Buckley MD    Exam:  Airway: clear, no airway problems anticipated  Heart: RRR, without gallops or rubs  Lungs: clear bilaterally without wheezes, crackles, or rhonchi  Abdomen: soft, nontender, nondistended, bowel sounds present  Mental Status: awake, alert and oriented to person, place and time    Sedation:  MAC anesthesia Propofol  Procedure Details:  After informed consent was obtained with all risks and benefits of procedure explained and preoperative exam completed, the patient was taken to the endoscopy suite and placed in the left lateral decubitus position. Upon sequential sedation as per above, a digital rectal exam was performed demonstrating internal hemorrhoids. The Olympus videocolonoscope  was inserted in the rectum and carefully advanced to the cecum, which was identified by the ileocecal valve and appendiceal orifice. The quality of preparation was poor. The colonoscope was slowly withdrawn with careful evaluation between folds. Retroflexion in the rectum was completed demonstrating internal hemorrhoids. Findings:   1. Moderate sigmoid diverticulosis  2. Medium sized internal hemorrhoids seen on retroflexion  3. Otherwise normal colonoscopy through to the cecum. Note the bowel preparation was very suboptimal and flat lesions and lesions <15 mm could have been missed    Specimen Removed:  None  Complications: None. EBL:  None. Impression:    1. Moderate sigmoid diverticulosis  2. Medium sized internal hemorrhoids seen on retroflexion  3. Otherwise normal colonoscopy through to the cecum.  Note the bowel preparation was very suboptimal and flat lesions and lesions <15 mm could have been missed    Recommendations:   1. Repeat colonoscopy in 6-12 months with extended bowel preparation    Discharge Disposition:  Home in the company of a  when able to ambulate.       Emi Vinson MD

## 2020-01-28 RX ORDER — ACETAMINOPHEN 650 MG/1
TABLET, FILM COATED, EXTENDED RELEASE ORAL
Qty: 40 TAB | Refills: 1 | Status: SHIPPED | OUTPATIENT
Start: 2020-01-28 | End: 2020-09-02

## 2020-02-24 DIAGNOSIS — I10 ESSENTIAL HYPERTENSION: ICD-10-CM

## 2020-02-24 DIAGNOSIS — G47.00 INSOMNIA, UNSPECIFIED TYPE: ICD-10-CM

## 2020-02-24 DIAGNOSIS — R73.03 PREDIABETES: ICD-10-CM

## 2020-02-24 DIAGNOSIS — N95.1 HOT FLASHES, MENOPAUSAL: ICD-10-CM

## 2020-02-24 DIAGNOSIS — E78.00 HYPERCHOLESTEROLEMIA: ICD-10-CM

## 2020-02-24 RX ORDER — VENLAFAXINE 75 MG/1
TABLET ORAL
Qty: 30 TAB | Refills: 0 | Status: SHIPPED | OUTPATIENT
Start: 2020-02-24 | End: 2020-04-16

## 2020-04-01 DIAGNOSIS — I10 ESSENTIAL HYPERTENSION: ICD-10-CM

## 2020-04-01 DIAGNOSIS — R73.03 PREDIABETES: ICD-10-CM

## 2020-04-01 DIAGNOSIS — N95.1 HOT FLASHES, MENOPAUSAL: ICD-10-CM

## 2020-04-01 DIAGNOSIS — G47.00 INSOMNIA, UNSPECIFIED TYPE: ICD-10-CM

## 2020-04-01 DIAGNOSIS — E78.00 HYPERCHOLESTEROLEMIA: ICD-10-CM

## 2020-04-02 RX ORDER — SIMVASTATIN 10 MG/1
TABLET, FILM COATED ORAL
Qty: 90 TAB | Refills: 1 | Status: SHIPPED | OUTPATIENT
Start: 2020-04-02 | End: 2020-10-29

## 2020-04-15 DIAGNOSIS — I10 ESSENTIAL HYPERTENSION: ICD-10-CM

## 2020-04-15 DIAGNOSIS — R73.03 PREDIABETES: ICD-10-CM

## 2020-04-15 DIAGNOSIS — G47.00 INSOMNIA, UNSPECIFIED TYPE: ICD-10-CM

## 2020-04-15 DIAGNOSIS — N95.1 HOT FLASHES, MENOPAUSAL: ICD-10-CM

## 2020-04-15 DIAGNOSIS — E78.00 HYPERCHOLESTEROLEMIA: ICD-10-CM

## 2020-04-16 RX ORDER — VENLAFAXINE 75 MG/1
TABLET ORAL
Qty: 30 TAB | Refills: 0 | Status: SHIPPED | OUTPATIENT
Start: 2020-04-16 | End: 2020-06-22

## 2020-06-05 ENCOUNTER — OFFICE VISIT (OUTPATIENT)
Dept: FAMILY MEDICINE CLINIC | Age: 70
End: 2020-06-05

## 2020-06-05 VITALS
DIASTOLIC BLOOD PRESSURE: 85 MMHG | TEMPERATURE: 98 F | HEIGHT: 63 IN | OXYGEN SATURATION: 99 % | HEART RATE: 68 BPM | RESPIRATION RATE: 16 BRPM | BODY MASS INDEX: 27.81 KG/M2 | SYSTOLIC BLOOD PRESSURE: 155 MMHG

## 2020-06-05 DIAGNOSIS — Z13.39 SCREENING FOR ALCOHOLISM: ICD-10-CM

## 2020-06-05 DIAGNOSIS — Z71.89 ADVANCED DIRECTIVES, COUNSELING/DISCUSSION: ICD-10-CM

## 2020-06-05 DIAGNOSIS — Z78.0 POSTMENOPAUSAL STATE: ICD-10-CM

## 2020-06-05 DIAGNOSIS — Z00.00 MEDICARE ANNUAL WELLNESS VISIT, SUBSEQUENT: Primary | ICD-10-CM

## 2020-06-05 DIAGNOSIS — Z13.31 SCREENING FOR DEPRESSION: ICD-10-CM

## 2020-06-05 DIAGNOSIS — Z12.31 ENCOUNTER FOR SCREENING MAMMOGRAM FOR MALIGNANT NEOPLASM OF BREAST: ICD-10-CM

## 2020-06-05 RX ORDER — ERGOCALCIFEROL 1.25 MG/1
50000 CAPSULE ORAL
Qty: 4 CAP | Refills: 11 | Status: SHIPPED | OUTPATIENT
Start: 2020-06-05 | End: 2022-07-06

## 2020-06-05 RX ORDER — VITAMIN B COMPLEX
1 CAPSULE ORAL DAILY
Qty: 30 CAP | Refills: 4 | Status: SHIPPED | OUTPATIENT
Start: 2020-06-05 | End: 2022-07-06

## 2020-06-05 NOTE — PROGRESS NOTES
Chief Complaint   Patient presents with   [facility] Annual Wellness Visit     1. Have you been to the ER, urgent care clinic since your last visit? Hospitalized since your last visit? No    2. Have you seen or consulted any other health care providers outside of the [address] [place] [address] since your last visit? Include any pap smears or colon screening. No    Verified patient with two type of identifiers.  Denies c/o at present

## 2020-06-05 NOTE — ACP (ADVANCE CARE PLANNING)
Advance Care Planning (ACP) Physician       Date of ACP Conversation: 4/24/2020    Conversation Conducted with:   Patient with Decision Making Capacity     Other Legally Authorized Decision Maker would be Tete Rainey Spouse as SDM     For My patients who has currently great Decision Making Capacity:     Patient is on presence of no family member,, stated that the pt wants to be not DNR at this time,  The pt likes to be a full code individual,  The patient states that there is a lot of will to live,  Pt was given the form,   will sign and bring us a copy on the later date.       Conversation Outcomes / Follow-Up Plan:   Completed new Advance Directive      Length of ACP Conversation in minutes:  16 minutes

## 2020-06-05 NOTE — PATIENT INSTRUCTIONS
Well Visit, Over 72: Care Instructions Your Care Instructions Physical exams can help you stay healthy. Your doctor has checked your overall health and may have suggested ways to take good care of yourself. He or she also may have recommended tests. At home, you can help prevent illness with healthy eating, regular exercise, and other steps. Follow-up care is a key part of your treatment and safety. Be sure to make and go to all appointments, and call your doctor if you are having problems. It's also a good idea to know your test results and keep a list of the medicines you take. How can you care for yourself at home? · Reach and stay at a healthy weight. This will lower your risk for many problems, such as obesity, diabetes, heart disease, and high blood pressure. · Get at least 30 minutes of exercise on most days of the week. Walking is a good choice. You also may want to do other activities, such as running, swimming, cycling, or playing tennis or team sports. · Do not smoke. Smoking can make health problems worse. If you need help quitting, talk to your doctor about stop-smoking programs and medicines. These can increase your chances of quitting for good. · Protect your skin from too much sun. When you're outdoors from 10 a.m. to 4 p.m., stay in the shade or cover up with clothing and a hat with a wide brim. Wear sunglasses that block UV rays. Even when it's cloudy, put broad-spectrum sunscreen (SPF 30 or higher) on any exposed skin. · See a dentist one or two times a year for checkups and to have your teeth cleaned. · Wear a seat belt in the car. Follow your doctor's advice about when to have certain tests. These tests can spot problems early. For men and women · Cholesterol. Your doctor will tell you how often to have this done based on your overall health and other things that can increase your risk for heart attack and stroke. · Blood pressure. Have your blood pressure checked during a routine doctor visit. Your doctor will tell you how often to check your blood pressure based on your age, your blood pressure results, and other factors. · Diabetes. Ask your doctor whether you should have tests for diabetes. · Vision. Experts recommend that you have yearly exams for glaucoma and other age-related eye problems. · Hearing. Tell your doctor if you notice any change in your hearing. You can have tests to find out how well you hear. · Colon cancer tests. Keep having colon cancer tests as your doctor recommends. You can have one of several types of tests. · Heart attack and stroke risk. At least every 4 to 6 years, you should have your risk for heart attack and stroke assessed. Your doctor uses factors such as your age, blood pressure, cholesterol, and whether you smoke or have diabetes to show what your risk for a heart attack or stroke is over the next 10 years. · Osteoporosis. Talk to your doctor about whether you should have a bone density test to find out whether you have thinning bones. Ask your doctor if you need to take a calcium plus vitamin D supplement. You may be able to get enough calcium and vitamin D through your diet. For women · Pap test and pelvic exam. You may no longer need a Pap test. Talk with your doctor about whether to stop or continue to have Pap tests. · Breast exam and mammogram. Ask how often you should have a mammogram, which is an X-ray of your breasts. A mammogram can spot breast cancer before it can be felt and when it is easiest to treat. · Thyroid disease. Talk to your doctor about whether to have your thyroid checked as part of a regular physical exam. Women have an increased chance of a thyroid problem. For men · Prostate exam. Talk to your doctor about whether you should have a blood test (called a PSA test) for prostate cancer.  Experts recommend that you discuss the benefits and risks of the test with your doctor before you decide whether to have this test. Some experts say that men ages 79 and older no longer need testing. · Abdominal aortic aneurysm. Ask your doctor whether you should have a test to check for an aneurysm. You may need a test if you ever smoked or if your parent, brother, sister, or child has had an aneurysm. When should you call for help? Watch closely for changes in your health, and be sure to contact your doctor if you have any problems or symptoms that concern you. Where can you learn more? Go to http://gosiaSAFCellailyn.info/ Enter W571 in the search box to learn more about \"Well Visit, Over 65: Care Instructions. \" Current as of: August 22, 2019               Content Version: 12.5 © 5117-9650 [employer]. Care instructions adapted under license by [employer] (which disclaims liability or warranty for this information). If you have questions about a medical condition or this instruction, always ask your healthcare professional. [place] [address] any warranty or liability for your use of this information. Medicare Wellness Visit, Female The best way to live healthy is to have a lifestyle where you eat a well-balanced diet, exercise regularly, limit alcohol use, and quit all forms of tobacco/nicotine, if applicable. Regular preventive services are another way to keep healthy. Preventive services (vaccines, screening tests, monitoring & exams) can help personalize your care plan, which helps you manage your own care. Screening tests can find health problems at the earliest stages, when they are easiest to treat. [facility] follows the current, evidence-based guidelines published by the [facility] (USPSTF) when recommending preventive services for our patients.  Because we follow these guidelines, sometimes recommendations change over time as research supports it. (For example, mammograms used to be recommended annually. Even though Medicare will still pay for an annual mammogram, the newer guidelines recommend a mammogram every two years for women of average risk). Of course, you and your doctor may decide to screen more often for some diseases, based on your risk and your co-morbidities (chronic disease you are already diagnosed with). Preventive services for you include: - Medicare offers their members a free annual wellness visit, which is time for you and your primary care provider to discuss and plan for your preventive service needs. Take advantage of this benefit every year! 
-All adults over the age of 72 should receive the recommended pneumonia vaccines. Current USPSTF guidelines recommend a series of two vaccines for the best pneumonia protection.  
-All adults should have a flu vaccine yearly and a tetanus vaccine every 10 years.  
-All adults age 48 and older should receive the shingles vaccines (series of two vaccines). -All adults age 38-68 who are overweight should have a diabetes screening test once every three years.  
-All adults born between [birth year] and 1965 should be screened once for Hepatitis C. 
-Other screening tests and preventive services for persons with diabetes include: an eye exam to screen for diabetic retinopathy, a kidney function test, a foot exam, and stricter control over your cholesterol.  
-Cardiovascular screening for adults with routine risk involves an electrocardiogram (ECG) at intervals determined by your doctor.  
-Colorectal cancer screenings should be done for adults age 54-65 with no increased risk factors for colorectal cancer. There are a number of acceptable methods of screening for this type of cancer. Each test has its own benefits and drawbacks.  Discuss with your doctor what is most appropriate for you during your annual wellness visit. The different tests include: colonoscopy (considered the best screening method), a fecal occult blood test, a fecal DNA test, and sigmoidoscopy. 
 
-A bone mass density test is recommended when a woman turns 65 to screen for osteoporosis. This test is only recommended one time, as a screening. Some providers will use this same test as a disease monitoring tool if you already have osteoporosis. -Breast cancer screenings are recommended every other year for women of normal risk, age 54-69. 
-Cervical cancer screenings for women over age 72 are only recommended with certain risk factors. Here is a list of your current Health Maintenance items (your personalized list of preventive services) with a due date: 
Health Maintenance Due Topic Date Due  
 DTaP/Tdap/Td  (1 - Tdap) 03/05/1970  Shingles Vaccine (1 of 2) 03/05/1999  Glaucoma Screening   03/05/2014  Hemoglobin A1C    04/02/2019 Anel Walker Annual Well Visit  06/05/2020  Cholesterol Test   06/05/2020

## 2020-06-05 NOTE — PROGRESS NOTES
This is the Subsequent Medicare Annual Wellness Exam, performed 12 months or more after the Initial AWV or the last Subsequent AWV    I have reviewed the patient's medical history in detail and updated the computerized patient record. History       Present for CPE, last Complete Physical exam was 2017 Up todate w/ all vaccination, last tetanus vaccine was offered in 2019 bu pt opted     last mammog was nl and  In 1yrs ago,   last pap smear normal and was in one yr   .      last colonoscopy was >5yrs ago  No past surgical hx,  last bone dexa scan wasin 2019 with osteopenia on meds,   No family hx of breast cancer      no family hx of colon cancer,  Not sexaully active  +++ physically active,  compliant w/ meds, +++Rf needed for today for her meds.    Patient has good supportive care at home, and feels safe no physical mental abuse,no fall not depressed not anxious sleeping well    pt at this visit, is physically functional with gait nl and nicely independent and walks w/out mobility device and, in addition mentaly is functional,  very Alert and oriented , BMI for the pt's age the almost nl BMI r/w'd  46.6 % Final MCV 77     VITAMIN D, 25-HYDROXY 22.4     Vitamin B12 391     Patient Active Problem List   Diagnosis Code    HTN (hypertension) I10    Insomnia G47.00    Hot flashes, menopausal N95.1    Osteopenia M85.80    Cellulitis L03.90    Candida vaginitis B37.3    Vitamin D deficiency E55.9    Hypercholesterolemia E78.00    Prediabetes R73.03    Rash, skin R21    S/P partial hysterectomy Z90.711    Knee pain, right M25.561    Recurrent sinusitis J32.9     Past Medical History:   Diagnosis Date    Contact dermatitis and other eczema, due to unspecified cause     Hot flashes, menopausal 4/21/2010    HTN (hypertension) 3/29/2010    Insomnia 3/29/2010    Knee pain, right 6/4/2015    Osteopenia 4/21/2010    Prediabetes 6/8/2012    Recurrent sinusitis 3/7/2016    Screening for osteoporosis 3/7/2016      Past Surgical History:   Procedure Laterality Date    COLONOSCOPY N/A 1/24/2020    COLONOSCOPY performed by Evon Carroll MD at \[facility]"" ENDOSCOPY    ENDOSCOPY, COLON, DIAGNOSTIC      in 2009    HX BREAST BIOPSY Left 1970    benign:  no visible scar    HX HYSTERECTOMY       Current Outpatient Medications   Medication Sig Dispense Refill    venlafaxine (EFFEXOR) 75 mg tablet TAKE ONE TABLET BY MOUTH DAILY 30 Tab 0    simvastatin (ZOCOR) 10 mg tablet TAKE ONE TABLET BY MOUTH ONCE NIGHTLY 90 Tab 1    MAPAP ARTHRITIS PAIN 650 mg TbER TAKE ONE TABLET BY MOUTH EVERY 6 HOURS AS NEEDED FOR PAIN 40 Tab 1    hydroCHLOROthiazide (HYDRODIURIL) 25 mg tablet TAKE ONE TABLET BY MOUTH DAILY 90 Tab 1    albuterol (PROVENTIL HFA, VENTOLIN HFA, PROAIR HFA) 90 mcg/actuation inhaler Take 1 Puff by inhalation every four (4) hours as needed for Wheezing. 1 Inhaler 1    calcium-cholecalciferol, D3, (CALTRATE 600+D) tablet Take 1 Tab by mouth daily. 30 Tab 0    aspirin 81 mg chewable tablet Take 1 Tab by mouth daily.  27 Tab 11     No Known Allergies    Family History   Problem Relation Age of Onset   [address] [facility] Stroke Mother     Hypertension Sister     Hypertension Brother     Hypertension Sister     Hypertension Brother      Social History     Tobacco Use    Smoking status: Never Smoker    Smokeless tobacco: Never Used    Tobacco comment: quit 30 yrs ago   Substance Use Topics    Alcohol use: No     Comment: occation       Depression Risk Factor Screening:     3 most recent PHQ Screens 6/5/2020   Little interest or pleasure in doing things Not at all   Feeling down, depressed, irritable, or hopeless Not at all   Total Score PHQ 2 0       Alcohol Risk Factor Screening:   Do you average 1 drink per night or more than 7 drinks a week:  No    On any one occasion in the past three months have you have had more than 3 drinks containing alcohol:  No      Functional Ability and Level of Safety:   Hearing: Hearing is good.    Activities of Daily Living: The home contains: handrails, grab bars and rugs  Patient does total self care    Ambulation: with no difficulty    Fall Risk:  Fall Risk Assessment, last 12 mths 6/5/2020   Able to walk? -   Fall in past 12 months? No       Abuse Screen:  Patient is not abused    Cognitive Screening   Has your family/caregiver stated any concerns about your memory: no  Cognitive Screening: Normal - MMSE (Mini Mental Status Exam)    Patient Care Team   Patient Care Team:  Evelyn Armstrong MD as PCP - General (Family Practice)  Evelyn Armstrong MD as PCP - [facility] Empaneled Provider    Assessment/Plan   Education and counseling provided:  Are appropriate based on today's review and evaluation  End-of-Life planning (with patient's consent)  Colorectal cancer screening tests    Diagnoses and all orders for this visit:    1. Medicare annual wellness visit, subsequent  -     ergocalciferol (ERGOCALCIFEROL) 1,250 mcg (50,000 unit) capsule; Take 1 Cap by mouth every seven (7) days. -     vitamin B complex capsule; Take 1 Cap by mouth daily. 2. Advanced directives, counseling/discussion  -     ADVANCE CARE PLANNING FIRST 30 MINS  -     FULL CODE  -     ergocalciferol (ERGOCALCIFEROL) 1,250 mcg (50,000 unit) capsule; Take 1 Cap by mouth every seven (7) days. -     vitamin B complex capsule; Take 1 Cap by mouth daily. 3. Screening for alcoholism  -     DC ANNUAL ALCOHOL SCREEN 15 MIN  -     ergocalciferol (ERGOCALCIFEROL) 1,250 mcg (50,000 unit) capsule; Take 1 Cap by mouth every seven (7) days. -     vitamin B complex capsule; Take 1 Cap by mouth daily. 4. Screening for depression  -     DEPRESSION SCREEN ANNUAL  -     ergocalciferol (ERGOCALCIFEROL) 1,250 mcg (50,000 unit) capsule; Take 1 Cap by mouth every seven (7) days. -     vitamin B complex capsule; Take 1 Cap by mouth daily.     5. Encounter for screening mammogram for malignant neoplasm of breast  -     JOSE MAMMO BI SCREENING INCL CAD; Future  -     ergocalciferol (ERGOCALCIFEROL) 1,250 mcg (50,000 unit) capsule; Take 1 Cap by mouth every seven (7) days. -     vitamin B complex capsule; Take 1 Cap by mouth daily. 6. Postmenopausal state  -     ergocalciferol (ERGOCALCIFEROL) 1,250 mcg (50,000 unit) capsule; Take 1 Cap by mouth every seven (7) days. -     vitamin B complex capsule; Take 1 Cap by mouth daily. AWV education and counseling provided:    Age appropriate evidence-based preventive care recommendations based on today's review and evaluation; including relevant cancer screening guidelines, and vaccination recommendations. Primary secondary and tertiary preventive measures were discussed with the patient with information about these guidelines, and a personalized schedule for health maintenance items. When appropriate and with patient agreement, orders noted were placed to complete missing health maintenance items.       Health Maintenance Due   Topic Date Due    DTaP/Tdap/Td series (1 - Tdap) 03/05/1970    Shingrix Vaccine Age 50> (1 of 2) 03/05/1999    GLAUCOMA SCREENING Q2Y  03/05/2014    A1C test (Diabetic or Prediabetic)  04/02/2019    Medicare Yearly Exam  06/05/2020    Lipid Screen  06/05/2020

## 2020-06-22 DIAGNOSIS — I10 ESSENTIAL HYPERTENSION: ICD-10-CM

## 2020-06-22 DIAGNOSIS — N95.1 HOT FLASHES, MENOPAUSAL: ICD-10-CM

## 2020-06-22 DIAGNOSIS — E78.00 HYPERCHOLESTEROLEMIA: ICD-10-CM

## 2020-06-22 DIAGNOSIS — G47.00 INSOMNIA, UNSPECIFIED TYPE: ICD-10-CM

## 2020-06-22 DIAGNOSIS — R73.03 PREDIABETES: ICD-10-CM

## 2020-06-22 RX ORDER — VENLAFAXINE 75 MG/1
TABLET ORAL
Qty: 30 TAB | Refills: 0 | Status: SHIPPED | OUTPATIENT
Start: 2020-06-22 | End: 2020-08-21 | Stop reason: SDUPTHER

## 2020-06-28 DIAGNOSIS — E78.00 HYPERCHOLESTEROLEMIA: ICD-10-CM

## 2020-06-28 DIAGNOSIS — N95.1 HOT FLASHES, MENOPAUSAL: ICD-10-CM

## 2020-06-28 DIAGNOSIS — G47.00 INSOMNIA, UNSPECIFIED TYPE: ICD-10-CM

## 2020-06-28 DIAGNOSIS — I10 ESSENTIAL HYPERTENSION: ICD-10-CM

## 2020-06-28 DIAGNOSIS — R73.03 PREDIABETES: ICD-10-CM

## 2020-06-29 RX ORDER — HYDROCHLOROTHIAZIDE 25 MG/1
TABLET ORAL
Qty: 90 TAB | Refills: 0 | Status: SHIPPED | OUTPATIENT
Start: 2020-06-29 | End: 2020-09-28

## 2020-08-03 ENCOUNTER — HOSPITAL ENCOUNTER (OUTPATIENT)
Dept: MAMMOGRAPHY | Age: 70
Discharge: HOME | End: 2020-08-03
Attending: FAMILY MEDICINE
Payer: MEDICARE

## 2020-08-03 DIAGNOSIS — Z12.31 ENCOUNTER FOR SCREENING MAMMOGRAM FOR MALIGNANT NEOPLASM OF BREAST: ICD-10-CM

## 2020-08-03 PROCEDURE — 77067 SCR MAMMO BI INCL CAD: CPT

## 2020-08-20 DIAGNOSIS — N95.1 HOT FLASHES, MENOPAUSAL: ICD-10-CM

## 2020-08-20 DIAGNOSIS — G47.00 INSOMNIA, UNSPECIFIED TYPE: ICD-10-CM

## 2020-08-20 DIAGNOSIS — I10 ESSENTIAL HYPERTENSION: ICD-10-CM

## 2020-08-20 DIAGNOSIS — E78.00 HYPERCHOLESTEROLEMIA: ICD-10-CM

## 2020-08-20 DIAGNOSIS — R73.03 PREDIABETES: ICD-10-CM

## 2020-08-21 RX ORDER — VENLAFAXINE 75 MG/1
TABLET ORAL
Qty: 30 TAB | Refills: 0 | Status: SHIPPED | OUTPATIENT
Start: 2020-08-21 | End: 2020-10-16

## 2020-09-02 RX ORDER — ACETAMINOPHEN 650 MG/1
TABLET, FILM COATED, EXTENDED RELEASE ORAL
Qty: 40 TAB | Refills: 0 | Status: SHIPPED | OUTPATIENT
Start: 2020-09-02 | End: 2020-12-29

## 2020-09-24 DIAGNOSIS — N95.1 HOT FLASHES, MENOPAUSAL: ICD-10-CM

## 2020-09-24 DIAGNOSIS — G47.00 INSOMNIA, UNSPECIFIED TYPE: ICD-10-CM

## 2020-09-24 DIAGNOSIS — E78.00 HYPERCHOLESTEROLEMIA: ICD-10-CM

## 2020-09-24 DIAGNOSIS — R73.03 PREDIABETES: ICD-10-CM

## 2020-09-24 DIAGNOSIS — I10 ESSENTIAL HYPERTENSION: ICD-10-CM

## 2020-09-28 RX ORDER — HYDROCHLOROTHIAZIDE 25 MG/1
TABLET ORAL
Qty: 90 TAB | Refills: 0 | Status: SHIPPED | OUTPATIENT
Start: 2020-09-28 | End: 2020-12-29

## 2020-10-16 DIAGNOSIS — R73.03 PREDIABETES: ICD-10-CM

## 2020-10-16 DIAGNOSIS — N95.1 HOT FLASHES, MENOPAUSAL: ICD-10-CM

## 2020-10-16 DIAGNOSIS — E78.00 HYPERCHOLESTEROLEMIA: ICD-10-CM

## 2020-10-16 DIAGNOSIS — G47.00 INSOMNIA, UNSPECIFIED TYPE: ICD-10-CM

## 2020-10-16 DIAGNOSIS — I10 ESSENTIAL HYPERTENSION: ICD-10-CM

## 2020-10-16 RX ORDER — VENLAFAXINE 75 MG/1
TABLET ORAL
Qty: 30 TAB | Refills: 0 | Status: SHIPPED | OUTPATIENT
Start: 2020-10-16 | End: 2020-12-15

## 2020-10-26 DIAGNOSIS — N95.1 HOT FLASHES, MENOPAUSAL: ICD-10-CM

## 2020-10-26 DIAGNOSIS — I10 ESSENTIAL HYPERTENSION: ICD-10-CM

## 2020-10-26 DIAGNOSIS — G47.00 INSOMNIA, UNSPECIFIED TYPE: ICD-10-CM

## 2020-10-26 DIAGNOSIS — E78.00 HYPERCHOLESTEROLEMIA: ICD-10-CM

## 2020-10-26 DIAGNOSIS — R73.03 PREDIABETES: ICD-10-CM

## 2020-10-29 RX ORDER — SIMVASTATIN 10 MG/1
TABLET, FILM COATED ORAL
Qty: 90 TAB | Refills: 0 | Status: SHIPPED | OUTPATIENT
Start: 2020-10-29 | End: 2021-02-03

## 2020-12-15 DIAGNOSIS — N95.1 HOT FLASHES, MENOPAUSAL: ICD-10-CM

## 2020-12-15 DIAGNOSIS — I10 ESSENTIAL HYPERTENSION: ICD-10-CM

## 2020-12-15 DIAGNOSIS — R73.03 PREDIABETES: ICD-10-CM

## 2020-12-15 DIAGNOSIS — E78.00 HYPERCHOLESTEROLEMIA: ICD-10-CM

## 2020-12-15 DIAGNOSIS — G47.00 INSOMNIA, UNSPECIFIED TYPE: ICD-10-CM

## 2020-12-15 RX ORDER — VENLAFAXINE 75 MG/1
TABLET ORAL
Qty: 30 TAB | Refills: 0 | Status: SHIPPED | OUTPATIENT
Start: 2020-12-15 | End: 2021-02-11

## 2020-12-26 DIAGNOSIS — I10 ESSENTIAL HYPERTENSION: ICD-10-CM

## 2020-12-26 DIAGNOSIS — N95.1 HOT FLASHES, MENOPAUSAL: ICD-10-CM

## 2020-12-26 DIAGNOSIS — E78.00 HYPERCHOLESTEROLEMIA: ICD-10-CM

## 2020-12-26 DIAGNOSIS — G47.00 INSOMNIA, UNSPECIFIED TYPE: ICD-10-CM

## 2020-12-26 DIAGNOSIS — R73.03 PREDIABETES: ICD-10-CM

## 2020-12-29 RX ORDER — HYDROCHLOROTHIAZIDE 25 MG/1
TABLET ORAL
Qty: 90 TAB | Refills: 0 | Status: SHIPPED | OUTPATIENT
Start: 2020-12-29 | End: 2021-03-30

## 2020-12-29 RX ORDER — ACETAMINOPHEN 650 MG/1
TABLET, FILM COATED, EXTENDED RELEASE ORAL
Qty: 40 TAB | Refills: 0 | Status: SHIPPED | OUTPATIENT
Start: 2020-12-29 | End: 2021-04-09

## 2021-01-17 ENCOUNTER — HOSPITAL ENCOUNTER (OUTPATIENT)
Dept: PREADMISSION TESTING | Age: 71
Discharge: HOME | End: 2021-01-17
Payer: MEDICARE

## 2021-01-17 PROCEDURE — 87635 SARS-COV-2 COVID-19 AMP PRB: CPT

## 2021-01-18 LAB
HEALTH STATUS, XMCV2T: NORMAL
SARS-COV-2, COV2NT: NOT DETECTED
SOURCE, COVRS: NORMAL
SPECIMEN SOURCE, FCOV2M: NORMAL
SPECIMEN TYPE, XMCV1T: NORMAL

## 2021-01-21 ENCOUNTER — ANESTHESIA EVENT (OUTPATIENT)
Dept: ENDOSCOPY | Age: 71
End: 2021-01-21
Payer: MEDICARE

## 2021-01-21 ENCOUNTER — ANESTHESIA (OUTPATIENT)
Dept: ENDOSCOPY | Age: 71
End: 2021-01-21
Payer: MEDICARE

## 2021-01-21 ENCOUNTER — HOSPITAL ENCOUNTER (OUTPATIENT)
Age: 71
Setting detail: OUTPATIENT SURGERY
Discharge: HOME | End: 2021-01-21
Attending: INTERNAL MEDICINE | Admitting: INTERNAL MEDICINE
Payer: MEDICARE

## 2021-01-21 VITALS
OXYGEN SATURATION: 97 % | DIASTOLIC BLOOD PRESSURE: 73 MMHG | HEART RATE: 67 BPM | TEMPERATURE: 98 F | RESPIRATION RATE: 12 BRPM | SYSTOLIC BLOOD PRESSURE: 120 MMHG | WEIGHT: 155 LBS | HEIGHT: 63 IN | BODY MASS INDEX: 27.46 KG/M2

## 2021-01-21 PROCEDURE — 74011250636 HC RX REV CODE- 250/636: Performed by: INTERNAL MEDICINE

## 2021-01-21 PROCEDURE — 76040000019: Performed by: INTERNAL MEDICINE

## 2021-01-21 PROCEDURE — 76060000031 HC ANESTHESIA FIRST 0.5 HR: Performed by: INTERNAL MEDICINE

## 2021-01-21 PROCEDURE — 2709999900 HC NON-CHARGEABLE SUPPLY: Performed by: INTERNAL MEDICINE

## 2021-01-21 PROCEDURE — 74011000250 HC RX REV CODE- 250: Performed by: NURSE ANESTHETIST, CERTIFIED REGISTERED

## 2021-01-21 PROCEDURE — 74011250636 HC RX REV CODE- 250/636: Performed by: NURSE ANESTHETIST, CERTIFIED REGISTERED

## 2021-01-21 RX ORDER — EPINEPHRINE 0.1 MG/ML
1 INJECTION INTRACARDIAC; INTRAVENOUS
Status: DISCONTINUED | OUTPATIENT
Start: 2021-01-21 | End: 2021-01-21 | Stop reason: HOSPADM

## 2021-01-21 RX ORDER — FLUMAZENIL 0.1 MG/ML
0.2 INJECTION INTRAVENOUS
Status: DISCONTINUED | OUTPATIENT
Start: 2021-01-21 | End: 2021-01-21 | Stop reason: HOSPADM

## 2021-01-21 RX ORDER — LIDOCAINE HYDROCHLORIDE 20 MG/ML
INJECTION, SOLUTION EPIDURAL; INFILTRATION; INTRACAUDAL; PERINEURAL AS NEEDED
Status: DISCONTINUED | OUTPATIENT
Start: 2021-01-21 | End: 2021-01-21 | Stop reason: HOSPADM

## 2021-01-21 RX ORDER — ATROPINE SULFATE 0.1 MG/ML
0.5 INJECTION INTRAVENOUS
Status: DISCONTINUED | OUTPATIENT
Start: 2021-01-21 | End: 2021-01-21 | Stop reason: HOSPADM

## 2021-01-21 RX ORDER — SODIUM CHLORIDE 9 MG/ML
75 INJECTION, SOLUTION INTRAVENOUS CONTINUOUS
Status: DISCONTINUED | OUTPATIENT
Start: 2021-01-21 | End: 2021-01-21 | Stop reason: HOSPADM

## 2021-01-21 RX ORDER — NALOXONE HYDROCHLORIDE 0.4 MG/ML
0.4 INJECTION, SOLUTION INTRAMUSCULAR; INTRAVENOUS; SUBCUTANEOUS
Status: DISCONTINUED | OUTPATIENT
Start: 2021-01-21 | End: 2021-01-21 | Stop reason: HOSPADM

## 2021-01-21 RX ORDER — DEXTROMETHORPHAN/PSEUDOEPHED 2.5-7.5/.8
1.2 DROPS ORAL
Status: DISCONTINUED | OUTPATIENT
Start: 2021-01-21 | End: 2021-01-21 | Stop reason: HOSPADM

## 2021-01-21 RX ORDER — SODIUM CHLORIDE 0.9 % (FLUSH) 0.9 %
5-40 SYRINGE (ML) INJECTION AS NEEDED
Status: DISCONTINUED | OUTPATIENT
Start: 2021-01-21 | End: 2021-01-21 | Stop reason: HOSPADM

## 2021-01-21 RX ORDER — SODIUM CHLORIDE 0.9 % (FLUSH) 0.9 %
5-40 SYRINGE (ML) INJECTION EVERY 8 HOURS
Status: DISCONTINUED | OUTPATIENT
Start: 2021-01-21 | End: 2021-01-21 | Stop reason: HOSPADM

## 2021-01-21 RX ORDER — PROPOFOL 10 MG/ML
INJECTION, EMULSION INTRAVENOUS AS NEEDED
Status: DISCONTINUED | OUTPATIENT
Start: 2021-01-21 | End: 2021-01-21 | Stop reason: HOSPADM

## 2021-01-21 RX ADMIN — SODIUM CHLORIDE: 900 INJECTION, SOLUTION INTRAVENOUS at 10:10

## 2021-01-21 RX ADMIN — PROPOFOL 50 MG: 10 INJECTION, EMULSION INTRAVENOUS at 10:23

## 2021-01-21 RX ADMIN — LIDOCAINE HYDROCHLORIDE 40 MG: 20 INJECTION, SOLUTION EPIDURAL; INFILTRATION; INTRACAUDAL; PERINEURAL at 10:23

## 2021-01-21 RX ADMIN — PROPOFOL 50 MG: 10 INJECTION, EMULSION INTRAVENOUS at 10:27

## 2021-01-21 RX ADMIN — PROPOFOL 50 MG: 10 INJECTION, EMULSION INTRAVENOUS at 10:26

## 2021-01-21 NOTE — DISCHARGE INSTRUCTIONS
[place]   [id number]  1950    COLON DISCHARGE INSTRUCTIONS  Discomfort:  Redness at IV site- apply warm compress to area; if redness or soreness persist- contact your physician  There may be a slight amount of blood passed from the rectum  Gaseous discomfort- walking, belching will help relieve any discomfort  You may not operate a vehicle for 12 hours  You may not engage in an occupation involving machinery or appliances for rest of today  You may not drink alcoholic beverages for at least 12 hours  Avoid making any critical decisions for at least 24 hour  DIET:   Regular diet. - however -  remember your colon is empty and a heavy meal will produce gas. Avoid these foods:  vegetables, fried / greasy foods, carbonated drinks for today  MEDICATION:  Per Medication Reconciliation       ACTIVITY:  You may not resume your normal daily activities until tomorrow AM; it is recommended that you spend the remainder of the day resting -  avoid any strenuous activity. CALL M.D. ANY SIGN OF:   Increasing pain, nausea, vomiting  Abdominal distension (swelling)  New increased bleeding (oral or rectal)  Fever (chills)  Pain in chest area  Bloody discharge from nose or mouth  Shortness of breath    You may  take any Advil, Aspirin, Ibuprofen, Motrin, Aleve, or Goodys for 10 days, ONLY  Tylenol as needed for pain. IMPRESSION:  Impression:   1. Poor prep which prevented adequate visualization and safe advancement of the colonoscope  2. Medium sized internal hemorrhoids seen on retroflexion    Recommendations:   1.  Repeat colonoscopy with extended bowel preparation    Follow-up Instructions:  Telephone # [phone]      Yuri So MD OT: Infant seen for developmental intervention due to elevated alk phos levels.  Tolerates BRY to all available joints with min s/s stress.  Noted to have mouth agape throughout intervention, unsure if attempts at increased respirations, positioning or elsewise but will continue to monitor.  Briefly interested in NNS, facilitated for 2 minutes in short bursts before infant fatigued.  Massage well tolerated with nice increased depth of breath.

## 2021-01-21 NOTE — PROGRESS NOTES
Endoscope was pre-cleaned at bedside immediately following procedure by Morena Miller. See Anesthesia report. Received report from anesthesia staff on vital signs and status of patient.

## 2021-01-21 NOTE — H&P
Gastroenterology Outpatient History and Physical    Patient: Sahara Phillips    Physician: Maikel Bowen MD    Chief Complaint: +Cologuard  History of Present Illness: No other GI complaints    History:  Past Medical History:   Diagnosis Date    Contact dermatitis and other eczema, due to unspecified cause     Hot flashes, menopausal 4/21/2010    HTN (hypertension) 3/29/2010    Insomnia 3/29/2010    Knee pain, right 6/4/2015    Osteopenia 4/21/2010    Prediabetes 6/8/2012    Recurrent sinusitis 3/7/2016    Screening for osteoporosis 3/7/2016      Past Surgical History:   Procedure Laterality Date    COLONOSCOPY N/A 1/24/2020    [facility]el [facility] at [facility] ENDOSCOPY    ENDOSCOPY, COLON, DIAGNOSTIC      in 2009    HX BREAST BIOPSY Left 1970    benign:  no visible scar    HX HYSTERECTOMY        Social History     Socioeconomic History    Marital status:      Spouse name: Not on file    Number of children: Not on file    Years of education: Not on file    Highest education level: Not on file   Tobacco Use    Smoking status: Former Smoker    Smokeless tobacco: Never Used    Tobacco comment: quit 30 yrs ago   Substance and Sexual Activity    Alcohol use: Yes     Comment: rare    Drug use: No    Sexual activity: Yes     Partners: Male      Family History   Problem Relation Age of Onset    Stroke Mother     Hypertension Sister     Hypertension Brother     Hypertension Sister     Hypertension Brother       Patient Active Problem List   Diagnosis Code    HTN (hypertension) I10    Insomnia G47.00    Hot flashes, menopausal N95.1    Osteopenia M85.80    Cellulitis L03.90    Candida vaginitis B37.3    Vitamin D deficiency E55.9    Hypercholesterolemia E78.00    Prediabetes R73.03    Rash, skin R21    S/P partial hysterectomy Z90.711    Knee pain, right M25.561    Recurrent sinusitis J32.9       Allergies: No Known Allergies  Medications:   Prior to Admission medications    Medication Sig Start Date End Date Taking? Authorizing Provider   hydroCHLOROthiazide (HYDRODIURIL) 25 mg tablet TAKE ONE TABLET BY MOUTH DAILY 12/29/20  Yes Silvia Steve MD   Mapap Arthritis Pain 650 mg TbER TAKE ONE TABLET BY MOUTH EVERY 6 HOURS AS NEEDED FOR PAIN 12/29/20  Yes Silvia Steve MD   venlafaxine (EFFEXOR) 75 mg tablet TAKE ONE TABLET BY MOUTH DAILY 12/15/20  Yes Silvia Steve MD   simvastatin (ZOCOR) 10 mg tablet TAKE ONE TABLET BY MOUTH ONCE NIGHTLY 10/29/20  Yes Silvia Steve MD   ergocalciferol (ERGOCALCIFEROL) 1,250 mcg (50,000 unit) capsule Take 1 Cap by mouth every seven (7) days. 6/5/20  Yes Silvia Steve MD   vitamin B complex capsule Take 1 Cap by mouth daily. 6/5/20  Yes Silvia Steve MD   albuterol (PROVENTIL HFA, VENTOLIN HFA, PROAIR HFA) 90 mcg/actuation inhaler Take 1 Puff by inhalation every four (4) hours as needed for Wheezing. 12/26/18  Yes Silvia Steve MD   calcium-cholecalciferol, D3, (CALTRATE 600+D) tablet Take 1 Tab by mouth daily. 11/1/17  Yes Jewels Banuelos, NP   aspirin 81 mg chewable tablet Take 1 Tab by mouth daily. 9/11/13  Yes Silvia Steve MD     Physical Exam:   Vital Signs: There were no vitals taken for this visit.   General: well developed, well nourished   HEENT: unremarkable   Heart: regular rhythm no mumur    Lungs: clear   Abdominal:  benign   Neurological: unremarkable   Extremities: no edema     Findings/Diagnosis: +Cologuard  Plan of Care/Planned Procedure: Colonoscopy with conscious/deep sedation    Signed:  Anson Anne MD 1/21/2021

## 2021-01-21 NOTE — ROUTINE PROCESS
[place] 1950 
[id number] Situation: 
Verbal report received from: [facility] RN Procedure: Procedure(s): 
COLONOSCOPY Background: 
 
Preoperative diagnosis: SCREENING Postoperative diagnosis: hemorrhoids, poor prep :  Dr. Colin Altman Assistant(s): Endoscopy Technician-1: Dick Gill Endoscopy RN-1: Shari Her RN Specimens: * No specimens in log * H. Pylori  no Assessment: Anesthesia gave intra-procedure sedation and medications, see anesthesia flow sheet yes Intravenous fluids: NS@ Exie Nose Vital signs stable Abdominal assessment: round and soft Recommendation: 
Discharge patient per MD order. Family or Friend Permission to share finding with family or friend yes

## 2021-01-21 NOTE — PROCEDURES
NAME:  Miguel Angel Steinberg   :   1950   MRN:   [id number]     Date/Time:  2021 10:30 AM    Colonoscopy Operative Report    Procedure Type:   Colonoscopy --diagnostic     Indications:     Occult blood in stool  Pre-operative Diagnosis: see indication above  Post-operative Diagnosis:  See findings below  :  Daylin Boyd MD  Referring Provider: --Fide Sharma MD    Exam:  Airway: clear, no airway problems anticipated  Heart: RRR, without gallops or rubs  Lungs: clear bilaterally without wheezes, crackles, or rhonchi  Abdomen: soft, nontender, nondistended, bowel sounds present  Mental Status: awake, alert and oriented to person, place and time    Sedation:  MAC anesthesia Propofol  Procedure Details:  After informed consent was obtained with all risks and benefits of procedure explained and preoperative exam completed, the patient was taken to the endoscopy suite and placed in the left lateral decubitus position. Upon sequential sedation as per above, a digital rectal exam was performed demonstrating internal hemorrhoids. The Olympus videocolonoscope  was inserted in the rectum and carefully advanced to the sigmoid colon. The quality of preparation was poor. The colonoscope was slowly withdrawn with careful evaluation between folds. Retroflexion in the rectum was completed demonstrating internal hemorrhoids. Findings:   1. Poor prep which prevented adequate visualization and safe advancement of the colonoscope  2. Medium sized internal hemorrhoids seen on retroflexion    Specimen Removed:  None  Complications: None. EBL:  None. Impression:   1. Poor prep which prevented adequate visualization and safe advancement of the colonoscope  2. Medium sized internal hemorrhoids seen on retroflexion    Recommendations:   1. Repeat colonoscopy with extended bowel preparation    Discharge Disposition:  Home in the company of a  when able to ambulate.       Brian Ocampo MD

## 2021-01-21 NOTE — ANESTHESIA POSTPROCEDURE EVALUATION
Procedure(s):  COLONOSCOPY.    total IV anesthesia    Anesthesia Post Evaluation        Patient location during evaluation: PACU  Note status: Adequate. Level of consciousness: responsive to verbal stimuli and sleepy but conscious  Pain management: satisfactory to patient  Airway patency: patent  Anesthetic complications: no  Cardiovascular status: acceptable  Respiratory status: acceptable  Hydration status: acceptable  Comments: +Post-Anesthesia Evaluation and Assessment    Patient: Saima Ramirez MRN: [id number]  SSN: [id number]   YOB: 1950  Age: 69 y.o. Sex: female      Cardiovascular Function/Vital Signs    BP (!) 140/56   Pulse 73   Temp 36.6 °C (97.8 °F)   Resp 11   Ht 5' 3\" (1.6 m)   Wt 70.3 kg (155 lb)   SpO2 100%   Breastfeeding No   BMI 27.46 kg/m²     Patient is status post Procedure(s):  COLONOSCOPY. Nausea/Vomiting: Controlled. Postoperative hydration reviewed and adequate. Pain:  Pain Scale 1: Numeric (0 - 10) (01/21/21 1012)  Pain Intensity 1: 0 (01/21/21 1012)   Managed. Neurological Status: At baseline. Mental Status and Level of Consciousness: Arousable. Pulmonary Status:   O2 Device: Room air (01/21/21 1029)   Adequate oxygenation and airway patent. Complications related to anesthesia: None    Post-anesthesia assessment completed. No concerns.     Signed By: Janna Martino MD    1/21/2021  Post anesthesia nausea and vomiting:  controlled      INITIAL Post-op Vital signs:   Vitals Value Taken Time   /56 01/21/21 1029   Temp     Pulse 73 01/21/21 1029   Resp 11 01/21/21 1029   SpO2 100 % 01/21/21 1029

## 2021-01-21 NOTE — ANESTHESIA PREPROCEDURE EVALUATION
Relevant Problems   CARDIOVASCULAR   (+) HTN (hypertension)       Anesthetic History   No history of anesthetic complications            Review of Systems / Medical History  Patient summary reviewed, nursing notes reviewed and pertinent labs reviewed    Pulmonary          Smoker      Comments: Former Smoker   Neuro/Psych   Within defined limits           Cardiovascular    Hypertension          Hyperlipidemia    Exercise tolerance: >4 METS     GI/Hepatic/Renal  Within defined limits              Endo/Other  Within defined limits           Other Findings   Comments:  Insomnia   Osteopenia  Contact dermatitis and other eczema  Recurrent sinusitis  Vitamin D deficiency           Physical Exam    Airway  Mallampati: II  TM Distance: 4 - 6 cm  Neck ROM: normal range of motion   Mouth opening: Normal     Cardiovascular  Regular rate and rhythm,  S1 and S2 normal,  no murmur, click, rub, or gallop             Dental  No notable dental hx    Comments: A few missing teeth, no loose   Pulmonary  Breath sounds clear to auscultation               Abdominal  GI exam deferred       Other Findings            Anesthetic Plan    ASA: 2  Anesthesia type: total IV anesthesia          Induction: Intravenous  Anesthetic plan and risks discussed with: Patient

## 2021-01-31 DIAGNOSIS — G47.00 INSOMNIA, UNSPECIFIED TYPE: ICD-10-CM

## 2021-01-31 DIAGNOSIS — R73.03 PREDIABETES: ICD-10-CM

## 2021-01-31 DIAGNOSIS — I10 ESSENTIAL HYPERTENSION: ICD-10-CM

## 2021-01-31 DIAGNOSIS — N95.1 HOT FLASHES, MENOPAUSAL: ICD-10-CM

## 2021-01-31 DIAGNOSIS — E78.00 HYPERCHOLESTEROLEMIA: ICD-10-CM

## 2021-02-03 RX ORDER — SIMVASTATIN 10 MG/1
TABLET, FILM COATED ORAL
Qty: 90 TAB | Refills: 0 | Status: SHIPPED | OUTPATIENT
Start: 2021-02-03 | End: 2021-05-11

## 2021-02-10 DIAGNOSIS — N95.1 HOT FLASHES, MENOPAUSAL: ICD-10-CM

## 2021-02-10 DIAGNOSIS — I10 ESSENTIAL HYPERTENSION: ICD-10-CM

## 2021-02-10 DIAGNOSIS — G47.00 INSOMNIA, UNSPECIFIED TYPE: ICD-10-CM

## 2021-02-10 DIAGNOSIS — R73.03 PREDIABETES: ICD-10-CM

## 2021-02-10 DIAGNOSIS — E78.00 HYPERCHOLESTEROLEMIA: ICD-10-CM

## 2021-02-11 RX ORDER — VENLAFAXINE 75 MG/1
TABLET ORAL
Qty: 30 TAB | Refills: 0 | Status: SHIPPED | OUTPATIENT
Start: 2021-02-11 | End: 2021-04-09

## 2021-03-28 DIAGNOSIS — G47.00 INSOMNIA, UNSPECIFIED TYPE: ICD-10-CM

## 2021-03-28 DIAGNOSIS — E78.00 HYPERCHOLESTEROLEMIA: ICD-10-CM

## 2021-03-28 DIAGNOSIS — I10 ESSENTIAL HYPERTENSION: ICD-10-CM

## 2021-03-28 DIAGNOSIS — N95.1 HOT FLASHES, MENOPAUSAL: ICD-10-CM

## 2021-03-28 DIAGNOSIS — R73.03 PREDIABETES: ICD-10-CM

## 2021-03-30 RX ORDER — HYDROCHLOROTHIAZIDE 25 MG/1
TABLET ORAL
Qty: 90 TAB | Refills: 0 | Status: SHIPPED | OUTPATIENT
Start: 2021-03-30 | End: 2021-06-29

## 2021-04-09 DIAGNOSIS — I10 ESSENTIAL HYPERTENSION: ICD-10-CM

## 2021-04-09 DIAGNOSIS — E78.00 HYPERCHOLESTEROLEMIA: ICD-10-CM

## 2021-04-09 DIAGNOSIS — G47.00 INSOMNIA, UNSPECIFIED TYPE: ICD-10-CM

## 2021-04-09 DIAGNOSIS — R73.03 PREDIABETES: ICD-10-CM

## 2021-04-09 DIAGNOSIS — N95.1 HOT FLASHES, MENOPAUSAL: ICD-10-CM

## 2021-04-09 RX ORDER — ACETAMINOPHEN 650 MG/1
TABLET, FILM COATED, EXTENDED RELEASE ORAL
Qty: 40 TAB | Refills: 0 | Status: SHIPPED | OUTPATIENT
Start: 2021-04-09 | End: 2021-07-02

## 2021-04-09 RX ORDER — VENLAFAXINE 75 MG/1
TABLET ORAL
Qty: 30 TAB | Refills: 0 | Status: SHIPPED | OUTPATIENT
Start: 2021-04-09 | End: 2021-06-04 | Stop reason: SDUPTHER

## 2021-05-10 DIAGNOSIS — E78.00 HYPERCHOLESTEROLEMIA: ICD-10-CM

## 2021-05-10 DIAGNOSIS — G47.00 INSOMNIA, UNSPECIFIED TYPE: ICD-10-CM

## 2021-05-10 DIAGNOSIS — N95.1 HOT FLASHES, MENOPAUSAL: ICD-10-CM

## 2021-05-10 DIAGNOSIS — I10 ESSENTIAL HYPERTENSION: ICD-10-CM

## 2021-05-10 DIAGNOSIS — R73.03 PREDIABETES: ICD-10-CM

## 2021-05-11 RX ORDER — SIMVASTATIN 10 MG/1
TABLET, FILM COATED ORAL
Qty: 90 TAB | Refills: 0 | Status: SHIPPED | OUTPATIENT
Start: 2021-05-11 | End: 2021-08-16

## 2021-05-18 ENCOUNTER — HOSPITAL ENCOUNTER (EMERGENCY)
Age: 71
Discharge: HOME | End: 2021-05-18
Attending: EMERGENCY MEDICINE
Payer: MEDICARE

## 2021-05-18 VITALS
OXYGEN SATURATION: 98 % | SYSTOLIC BLOOD PRESSURE: 130 MMHG | RESPIRATION RATE: 18 BRPM | HEIGHT: 63 IN | BODY MASS INDEX: 27.46 KG/M2 | HEART RATE: 80 BPM | WEIGHT: 155 LBS | DIASTOLIC BLOOD PRESSURE: 65 MMHG | TEMPERATURE: 97.9 F

## 2021-05-18 DIAGNOSIS — M62.838 CERVICAL PARASPINAL MUSCLE SPASM: Primary | ICD-10-CM

## 2021-05-18 DIAGNOSIS — M54.12 CERVICAL RADICULOPATHY: ICD-10-CM

## 2021-05-18 PROCEDURE — 99283 EMERGENCY DEPT VISIT LOW MDM: CPT

## 2021-05-18 PROCEDURE — 74011250637 HC RX REV CODE- 250/637: Performed by: EMERGENCY MEDICINE

## 2021-05-18 RX ORDER — METHOCARBAMOL 750 MG/1
750 TABLET, FILM COATED ORAL 3 TIMES DAILY
Qty: 15 TAB | Refills: 0 | Status: SHIPPED | OUTPATIENT
Start: 2021-05-18 | End: 2022-01-03 | Stop reason: ALTCHOICE

## 2021-05-18 RX ORDER — IBUPROFEN 800 MG/1
800 TABLET ORAL
Qty: 30 TAB | Refills: 0 | Status: SHIPPED | OUTPATIENT
Start: 2021-05-18 | End: 2022-01-03 | Stop reason: ALTCHOICE

## 2021-05-18 RX ORDER — IBUPROFEN 400 MG/1
800 TABLET ORAL
Status: COMPLETED | OUTPATIENT
Start: 2021-05-18 | End: 2021-05-18

## 2021-05-18 RX ORDER — METHOCARBAMOL 500 MG/1
500 TABLET, FILM COATED ORAL ONCE
Status: COMPLETED | OUTPATIENT
Start: 2021-05-18 | End: 2021-05-18

## 2021-05-18 RX ADMIN — IBUPROFEN 800 MG: 400 TABLET, FILM COATED ORAL at 11:59

## 2021-05-18 RX ADMIN — METHOCARBAMOL 500 MG: 500 TABLET ORAL at 11:59

## 2021-05-18 NOTE — ED TRIAGE NOTES
Pt presents to ED with c/o atraumatic right arm pain x1 week. Pt denies taking medication for pain today.

## 2021-05-18 NOTE — ED NOTES
. Pt is alert and oriented x 4, RR even and unlabored, skin is warm and dry. Assessment completed and pt updated on plan of care. Call bell in reach. Emergency Department Nursing Plan of Care       The Nursing Plan of Care is developed from the Nursing assessment and Emergency Department Attending provider initial evaluation. The plan of care may be reviewed in the ED Provider note.     The Plan of Care was developed with the following considerations:   Patient / Family readiness to learn indicated by:verbalized understanding  Persons(s) to be included in education: patient  Barriers to Learning/Limitations:No    Signed     Urban Crespo RN    5/18/2021   11:57 AM

## 2021-05-18 NOTE — ED PROVIDER NOTES
EMERGENCY DEPARTMENT HISTORY AND PHYSICAL EXAM      Date: 5/18/2021  Patient Name: Shanell Ramos    History of Presenting Illness     Chief Complaint   Patient presents with    Arm Pain     right arm, atraumatic pain x1 week     History Provided By: Patient    HPI: Shanell Ramos, 66 y.o. female with past medical history significant for hypertension and arthritis who presents via private vehicle to the ED with cc of atraumatic right shoulder and arm pain for the past week. Patient states the pain starts in the right shoulder and lateral neck and radiates down the right arm to the fingers. She endorses occasional numbness as well. She has tried using Tylenol, aspirin, BenGay, and Aspercreme with transient improvement in her symptoms. She is right-hand dominant. She also states that she buried her daughter this past weekend and believes that that stress may be contributing to some of her symptoms. Her pain is worse with movement of the right shoulder and arm and nothing makes the pain better. PMHx: Hypertension, osteoarthritis, hyperlipidemia  Social Hx: Former smoker, rare alcohol use, denies illegal drug use    PCP: Heather Tellez MD    There are no other complaints, changes, or physical findings at this time. No current facility-administered medications on file prior to encounter. Current Outpatient Medications on File Prior to Encounter   Medication Sig Dispense Refill    simvastatin (ZOCOR) 10 mg tablet TAKE ONE TABLET BY MOUTH ONCE NIGHTLY 90 Tab 0    venlafaxine (EFFEXOR) 75 mg tablet TAKE ONE TABLET BY MOUTH DAILY 30 Tab 0    hydroCHLOROthiazide (HYDRODIURIL) 25 mg tablet TAKE ONE TABLET BY MOUTH DAILY 90 Tab 0    ergocalciferol (ERGOCALCIFEROL) 1,250 mcg (50,000 unit) capsule Take 1 Cap by mouth every seven (7) days. 4 Cap 11    vitamin B complex capsule Take 1 Cap by mouth daily.  30 Cap 4    albuterol (PROVENTIL HFA, VENTOLIN HFA, PROAIR HFA) 90 mcg/actuation inhaler Take 1 Puff by inhalation every four (4) hours as needed for Wheezing. 1 Inhaler 1    calcium-cholecalciferol, D3, (CALTRATE 600+D) tablet Take 1 Tab by mouth daily. 30 Tab 0    aspirin 81 mg chewable tablet Take 1 Tab by mouth daily. 30 Tab 11    Mapap Arthritis Pain 650 mg TbER TAKE ONE TABLET BY MOUTH EVERY 6 HOURS AS NEEDED FOR PAIN 40 Tab 0     Past History     Past Medical History:  Past Medical History:   Diagnosis Date    Contact dermatitis and other eczema, due to unspecified cause     Hot flashes, menopausal 4/21/2010    HTN (hypertension) 3/29/2010    Insomnia 3/29/2010    Knee pain, right 6/4/2015    Osteopenia 4/21/2010    Prediabetes 6/8/2012    Recurrent sinusitis 3/7/2016    Screening for osteoporosis 3/7/2016     Past Surgical History:  Past Surgical History:   Procedure Laterality Date    COLONOSCOPY N/A 1/24/2020    COL[facility] at [facility] ENDOSCOPY    COLONOSCOPY N/A 1/21/2021    COLONOSCOPY performed by [facility] MD at [facility] ENDOSCOPY    ENDOSCOPY, COLON, DIAGNOSTIC      in 2009    HX BREAST BIOPSY Left 1970    benign:  no visible scar    HX HYSTERECTOMY       Family History:  Family History   Problem Relation Age of Onset    Stroke Mother     Hypertension Sister     Hypertension Brother     Hypertension Sister     Hypertension Brother      Social History:  Social History     Tobacco Use    Smoking status: Former Smoker    Smokeless tobacco: Never Used    Tobacco comment: quit 30 yrs ago   Substance Use Topics    Alcohol use: Yes     Comment: rare    Drug use: No     Allergies:  No Known Allergies  Review of Systems   Review of Systems   Constitutional: Negative for chills and fever. HENT: Negative for congestion, rhinorrhea, sneezing and sore throat. Eyes: Negative for redness and visual disturbance. Respiratory: Negative for shortness of breath. Cardiovascular: Negative for leg swelling.    Gastrointestinal: Negative for abdominal pain, nausea and vomiting. Genitourinary: Negative for difficulty urinating and frequency. Musculoskeletal: Positive for arthralgias and myalgias. Negative for back pain and neck stiffness. Skin: Negative for rash. Neurological: Positive for numbness. Negative for dizziness, syncope, weakness and headaches. Hematological: Negative for adenopathy. All other systems reviewed and are negative. Physical Exam   Physical Exam  Vitals signs and nursing note reviewed. Constitutional:       Appearance: Normal appearance. She is well-developed. HENT:      Head: Normocephalic and atraumatic. Eyes:      Conjunctiva/sclera: Conjunctivae normal.   Neck:      Musculoskeletal: Full passive range of motion without pain, normal range of motion and neck supple. Cardiovascular:      Rate and Rhythm: Normal rate and regular rhythm. Pulses: Normal pulses. Heart sounds: Normal heart sounds, S1 normal and S2 normal. No murmur. Pulmonary:      Effort: Pulmonary effort is normal. No respiratory distress. Breath sounds: Normal breath sounds. No wheezing. Abdominal:      General: Bowel sounds are normal. There is no distension. Palpations: Abdomen is soft. Tenderness: There is no abdominal tenderness. There is no rebound. Musculoskeletal: Normal range of motion. Right shoulder: She exhibits tenderness, pain and spasm. She exhibits no bony tenderness. Arms:    Skin:     General: Skin is warm and dry. Findings: No rash. Neurological:      Mental Status: She is alert and oriented to person, place, and time. Psychiatric:         Speech: Speech normal.         Behavior: Behavior normal.         Thought Content: Thought content normal.         Judgment: Judgment normal.       Diagnostic Study Results   Labs -   No results found for this or any previous visit (from the past 12 hour(s)). Radiologic Studies -   No orders to display     No results found.   Medical Decision Making   I am the first provider for this patient. I reviewed the vital signs, available nursing notes, past medical history, past surgical history, family history and social history. Vital Signs-Reviewed the patient's vital signs. Patient Vitals for the past 24 hrs:   Temp Pulse Resp BP SpO2   05/18/21 1140 97.9 °F (36.6 °C) 86 18 (!) 143/85 97 %     Pulse Oximetry Analysis - 97% on RA (normal)    Records Reviewed: Nursing Notes    Provider Notes (Medical Decision Making):   62-year-old female presents with atraumatic right shoulder and arm pain for the past week. She has spasm of her right rhomboid and reproducible tenderness when palpating the muscles of the right lateral neck and shoulder. She has no palpable bony deformities. Given the lack of trauma and lack of deformities, will defer imaging at this time. Will treat with NSAIDs and a muscle relaxer and have her follow-up with primary care. ED Course:   Initial assessment performed. The patients presenting problems have been discussed, and they are in agreement with the care plan formulated and outlined with them. I have encouraged them to ask questions as they arise throughout their visit. Progress Note:   Updated pt on all returned results and findings. Discussed the importance of proper follow up as referred below along with return precautions. Pt in agreement with the care plan and expresses agreement with and understanding of all items discussed. Disposition:  Discharge Note:  The pt is ready for discharge. The pt's signs, symptoms, diagnosis, and discharge instructions have been discussed and pt has conveyed their understanding. The pt is to follow up as recommended or return to ER should their symptoms worsen. Plan has been discussed and pt is in agreement. PLAN:  1.    Current Discharge Medication List      START taking these medications    Details   ibuprofen (MOTRIN) 800 mg tablet Take 1 Tab by mouth every eight (8) hours as needed for Pain. Qty: 30 Tab, Refills: 0  Start date: 5/18/2021      methocarbamoL (ROBAXIN) 750 mg tablet Take 1 Tab by mouth three (3) times daily. Qty: 15 Tab, Refills: 0  Start date: 5/18/2021           2. Follow-up Information     Follow up With Specialties Details Why Contact Info    Sanjay Montano MD Family Medicine Schedule an appointment as soon as possible for a visit  As needed [address] Dony De Leon DO Orthopedic Surgery Schedule an appointment as soon as possible for a visit  As needed [address] [facility]  [phone]      [facility] EMERGENCY DEPT Emergency Medicine  As needed, If symptoms worsen Martin Meyer  [phone]        Return to ED if worse     Diagnosis     Clinical Impression:   1. Cervical paraspinal muscle spasm    2. Cervical radiculopathy            Please note that this dictation was completed with Dragon, computer voice recognition software. Quite often unanticipated grammatical, syntax, homophones, and other interpretive errors are inadvertently transcribed by the computer software. Please disregard these errors. Additionally, please excuse any errors that have escaped final proofreading.

## 2021-05-18 NOTE — ED NOTES
Discharge instructions were given to the patient by Lenka Dimas RN. The patient left the Emergency Department ambulatory, alert and oriented and in no acute distress with 2 prescriptions. The patient was encouraged to call or return to the ED for worsening issues or problems and was encouraged to schedule a follow up appointment for continuing care. The patient verbalized understanding of discharge instructions and prescriptions, all questions were answered. The patient has no further concerns at this time.

## 2021-06-04 DIAGNOSIS — I10 ESSENTIAL HYPERTENSION: ICD-10-CM

## 2021-06-04 DIAGNOSIS — R73.03 PREDIABETES: ICD-10-CM

## 2021-06-04 DIAGNOSIS — E78.00 HYPERCHOLESTEROLEMIA: ICD-10-CM

## 2021-06-04 DIAGNOSIS — N95.1 HOT FLASHES, MENOPAUSAL: ICD-10-CM

## 2021-06-04 DIAGNOSIS — G47.00 INSOMNIA, UNSPECIFIED TYPE: ICD-10-CM

## 2021-06-04 RX ORDER — VENLAFAXINE 75 MG/1
TABLET ORAL
Qty: 30 TABLET | Refills: 0 | Status: SHIPPED | OUTPATIENT
Start: 2021-06-04 | End: 2021-08-09

## 2021-06-14 ENCOUNTER — TRANSCRIBE ORDER (OUTPATIENT)
Dept: SCHEDULING | Age: 71
End: 2021-06-14

## 2021-06-14 DIAGNOSIS — Z12.31 VISIT FOR SCREENING MAMMOGRAM: Primary | ICD-10-CM

## 2021-06-24 DIAGNOSIS — R73.03 PREDIABETES: ICD-10-CM

## 2021-06-24 DIAGNOSIS — E78.00 HYPERCHOLESTEROLEMIA: ICD-10-CM

## 2021-06-24 DIAGNOSIS — I10 ESSENTIAL HYPERTENSION: ICD-10-CM

## 2021-06-24 DIAGNOSIS — G47.00 INSOMNIA, UNSPECIFIED TYPE: ICD-10-CM

## 2021-06-24 DIAGNOSIS — N95.1 HOT FLASHES, MENOPAUSAL: ICD-10-CM

## 2021-06-29 RX ORDER — HYDROCHLOROTHIAZIDE 25 MG/1
TABLET ORAL
Qty: 90 TABLET | Refills: 0 | Status: SHIPPED | OUTPATIENT
Start: 2021-06-29 | End: 2021-09-20

## 2021-07-02 RX ORDER — ACETAMINOPHEN 650 MG/1
TABLET, FILM COATED, EXTENDED RELEASE ORAL
Qty: 40 TABLET | Refills: 0 | Status: SHIPPED | OUTPATIENT
Start: 2021-07-02 | End: 2021-09-20

## 2021-08-04 ENCOUNTER — HOSPITAL ENCOUNTER (OUTPATIENT)
Dept: MAMMOGRAPHY | Age: 71
Discharge: HOME | End: 2021-08-04
Attending: FAMILY MEDICINE
Payer: MEDICARE

## 2021-08-04 DIAGNOSIS — Z12.31 VISIT FOR SCREENING MAMMOGRAM: ICD-10-CM

## 2021-08-04 PROCEDURE — 77067 SCR MAMMO BI INCL CAD: CPT

## 2021-08-07 DIAGNOSIS — I10 ESSENTIAL HYPERTENSION: ICD-10-CM

## 2021-08-07 DIAGNOSIS — N95.1 HOT FLASHES, MENOPAUSAL: ICD-10-CM

## 2021-08-07 DIAGNOSIS — E78.00 HYPERCHOLESTEROLEMIA: ICD-10-CM

## 2021-08-07 DIAGNOSIS — G47.00 INSOMNIA, UNSPECIFIED TYPE: ICD-10-CM

## 2021-08-07 DIAGNOSIS — R73.03 PREDIABETES: ICD-10-CM

## 2021-08-09 RX ORDER — VENLAFAXINE 75 MG/1
TABLET ORAL
Qty: 30 TABLET | Refills: 0 | Status: SHIPPED | OUTPATIENT
Start: 2021-08-09 | End: 2021-09-20

## 2021-08-16 DIAGNOSIS — R73.03 PREDIABETES: ICD-10-CM

## 2021-08-16 DIAGNOSIS — E78.00 HYPERCHOLESTEROLEMIA: ICD-10-CM

## 2021-08-16 DIAGNOSIS — N95.1 HOT FLASHES, MENOPAUSAL: ICD-10-CM

## 2021-08-16 DIAGNOSIS — G47.00 INSOMNIA, UNSPECIFIED TYPE: ICD-10-CM

## 2021-08-16 DIAGNOSIS — I10 ESSENTIAL HYPERTENSION: ICD-10-CM

## 2021-08-16 RX ORDER — SIMVASTATIN 10 MG/1
TABLET, FILM COATED ORAL
Qty: 90 TABLET | Refills: 0 | Status: SHIPPED | OUTPATIENT
Start: 2021-08-16 | End: 2021-11-26

## 2021-09-20 DIAGNOSIS — N95.1 HOT FLASHES, MENOPAUSAL: ICD-10-CM

## 2021-09-20 DIAGNOSIS — R73.03 PREDIABETES: ICD-10-CM

## 2021-09-20 DIAGNOSIS — G47.00 INSOMNIA, UNSPECIFIED TYPE: ICD-10-CM

## 2021-09-20 DIAGNOSIS — E78.00 HYPERCHOLESTEROLEMIA: ICD-10-CM

## 2021-09-20 DIAGNOSIS — I10 ESSENTIAL HYPERTENSION: ICD-10-CM

## 2021-09-20 RX ORDER — ACETAMINOPHEN 650 MG/1
TABLET, FILM COATED, EXTENDED RELEASE ORAL
Qty: 40 TABLET | Refills: 0 | Status: SHIPPED | OUTPATIENT
Start: 2021-09-20 | End: 2021-11-18

## 2021-09-20 RX ORDER — VENLAFAXINE 75 MG/1
TABLET ORAL
Qty: 30 TABLET | Refills: 0 | Status: SHIPPED | OUTPATIENT
Start: 2021-09-20 | End: 2021-11-26

## 2021-09-20 RX ORDER — HYDROCHLOROTHIAZIDE 25 MG/1
TABLET ORAL
Qty: 90 TABLET | Refills: 0 | Status: SHIPPED | OUTPATIENT
Start: 2021-09-20 | End: 2021-12-22

## 2021-11-18 RX ORDER — GUAIFENESIN 100 MG/5ML
LIQUID (ML) ORAL
Qty: 40 TABLET | Refills: 0 | Status: SHIPPED | OUTPATIENT
Start: 2021-11-18 | End: 2022-03-04 | Stop reason: SDUPTHER

## 2021-11-26 DIAGNOSIS — R73.03 PREDIABETES: ICD-10-CM

## 2021-11-26 DIAGNOSIS — I10 ESSENTIAL HYPERTENSION: ICD-10-CM

## 2021-11-26 DIAGNOSIS — G47.00 INSOMNIA, UNSPECIFIED TYPE: ICD-10-CM

## 2021-11-26 DIAGNOSIS — E78.00 HYPERCHOLESTEROLEMIA: ICD-10-CM

## 2021-11-26 DIAGNOSIS — N95.1 HOT FLASHES, MENOPAUSAL: ICD-10-CM

## 2021-11-26 RX ORDER — SIMVASTATIN 10 MG/1
TABLET, FILM COATED ORAL
Qty: 90 TABLET | Refills: 0 | Status: SHIPPED | OUTPATIENT
Start: 2021-11-26 | End: 2022-03-04 | Stop reason: SDUPTHER

## 2021-11-26 RX ORDER — VENLAFAXINE 75 MG/1
TABLET ORAL
Qty: 30 TABLET | Refills: 0 | Status: SHIPPED | OUTPATIENT
Start: 2021-11-26 | End: 2022-02-10 | Stop reason: SDUPTHER

## 2021-12-20 ENCOUNTER — HOSPITAL ENCOUNTER (EMERGENCY)
Age: 71
Discharge: HOME | End: 2021-12-20
Attending: EMERGENCY MEDICINE
Payer: MEDICARE

## 2021-12-20 VITALS
DIASTOLIC BLOOD PRESSURE: 69 MMHG | WEIGHT: 157 LBS | TEMPERATURE: 99.1 F | HEART RATE: 83 BPM | SYSTOLIC BLOOD PRESSURE: 132 MMHG | RESPIRATION RATE: 19 BRPM | BODY MASS INDEX: 27.82 KG/M2 | HEIGHT: 63 IN | OXYGEN SATURATION: 100 %

## 2021-12-20 DIAGNOSIS — G56.01 CARPAL TUNNEL SYNDROME OF RIGHT WRIST: Primary | ICD-10-CM

## 2021-12-20 PROCEDURE — 99283 EMERGENCY DEPT VISIT LOW MDM: CPT

## 2021-12-20 PROCEDURE — 74011250637 HC RX REV CODE- 250/637: Performed by: EMERGENCY MEDICINE

## 2021-12-20 PROCEDURE — 99281 EMR DPT VST MAYX REQ PHY/QHP: CPT

## 2021-12-20 RX ORDER — NAPROXEN 500 MG/1
500 TABLET ORAL
Qty: 20 TABLET | Refills: 0 | Status: SHIPPED | OUTPATIENT
Start: 2021-12-20 | End: 2022-01-03 | Stop reason: ALTCHOICE

## 2021-12-20 RX ORDER — NAPROXEN 250 MG/1
500 TABLET ORAL
Status: COMPLETED | OUTPATIENT
Start: 2021-12-20 | End: 2021-12-20

## 2021-12-20 RX ADMIN — NAPROXEN 500 MG: 250 TABLET ORAL at 08:45

## 2021-12-20 NOTE — ED NOTES
Pt c/o right wrist pain x 2 days with pain into her hand and fingers; +PMS; pain with any movement; slight swelling noted.

## 2021-12-20 NOTE — ED TRIAGE NOTES
Per pt reports right hand and right arm pain x 2 days, +right 2nd finger pain x 3 weeks. Denies recent injury.

## 2021-12-20 NOTE — ED NOTES
Pt dcd to home and verbalized understanding of dc instructions, meds, FU and application of wrist brace.

## 2021-12-20 NOTE — ED PROVIDER NOTES
EMERGENCY DEPARTMENT HISTORY AND PHYSICAL EXAM      Date: 12/20/2021  Patient Name: Arabella John    History of Presenting Illness     Chief Complaint   Patient presents with    Arm Pain    Hand Pain     History Provided By: Patient    HPI: Arabella John, 66 y.o. female with past medical history significant for hypertension and osteopenia who presents via private vehicle to the ED with cc of atraumatic right forearm and hand pain for the past 2 days. Patient describes her pain as a throbbing/sharp pain on that is worse with movement and use and better with rest.  She denies any known injury. Her pain radiates from the palm of her hand and wrist up the right forearm. She complains of intermittent numbness and tingling to her right first through third fingers. She has tried taking Tylenol and ibuprofen intermittently for her pain with minimal improvement. PMHx: Hypertension osteopenia  Social Hx: Former smoker, rare alcohol use, denies illegal drug use    PCP: Oksana Gilbert MD    There are no other complaints, changes, or physical findings at this time. No current facility-administered medications on file prior to encounter. Current Outpatient Medications on File Prior to Encounter   Medication Sig Dispense Refill    simvastatin (ZOCOR) 10 mg tablet TAKE ONE TABLET BY MOUTH ONCE NIGHTLY 90 Tablet 0    venlafaxine (EFFEXOR) 75 mg tablet TAKE ONE TABLET BY MOUTH DAILY 30 Tablet 0    Arthritis Pain Relief 650 mg TbER TAKE ONE TABLET BY MOUTH EVERY 6 HOURS AS NEEDED FOR PAIN 40 Tablet 0    hydroCHLOROthiazide (HYDRODIURIL) 25 mg tablet TAKE ONE TABLET BY MOUTH DAILY 90 Tablet 0    ibuprofen (MOTRIN) 800 mg tablet Take 1 Tab by mouth every eight (8) hours as needed for Pain. 30 Tab 0    methocarbamoL (ROBAXIN) 750 mg tablet Take 1 Tab by mouth three (3) times daily. 15 Tab 0    ergocalciferol (ERGOCALCIFEROL) 1,250 mcg (50,000 unit) capsule Take 1 Cap by mouth every seven (7) days.  4 Cap 11  vitamin B complex capsule Take 1 Cap by mouth daily. 30 Cap 4    albuterol (PROVENTIL HFA, VENTOLIN HFA, PROAIR HFA) 90 mcg/actuation inhaler Take 1 Puff by inhalation every four (4) hours as needed for Wheezing. 1 Inhaler 1    calcium-cholecalciferol, D3, (CALTRATE 600+D) tablet Take 1 Tab by mouth daily. 30 Tab 0    aspirin 81 mg chewable tablet Take 1 Tab by mouth daily. 27 Tab 11     Past History     Past Medical History:  Past Medical History:   Diagnosis Date    Contact dermatitis and other eczema, due to unspecified cause     Hot flashes, menopausal 4/21/2010    HTN (hypertension) 3/29/2010    Insomnia 3/29/2010    Knee pain, right 6/4/2015    Osteopenia 4/21/2010    Prediabetes 6/8/2012    Recurrent sinusitis 3/7/2016    Screening for osteoporosis 3/7/2016     Past Surgical History:  Past Surgical History:   Procedure Laterality Date    COLONOSCOPY N/A 1/24/2020    COL[facility]ms [facility] at [facility] ENDOSCOPY    COLONOSCOPY N/A 1/21/2021    COLONOSCOPY performed by [facility]ms MD at [facility] ENDOSCOPY    ENDOSCOPY, COLON, DIAGNOSTIC      in 2009    HX BREAST BIOPSY Left 1970    benign:  no visible scar    HX HYSTERECTOMY      40's     Family History:  Family History   Problem Relation Age of Onset    Stroke Mother     Hypertension Sister     Hypertension Brother     Hypertension Sister     Hypertension Brother      Social History:  Social History     Tobacco Use    Smoking status: Former Smoker    Smokeless tobacco: Never Used    Tobacco comment: quit 30 yrs ago   Vaping Use    Vaping Use: Never used   Substance Use Topics    Alcohol use: Yes     Comment: 2 beer per week    Drug use: No     Allergies:  No Known Allergies  Review of Systems   Review of Systems   Constitutional: Negative for chills and fever. HENT: Negative for congestion, rhinorrhea, sneezing and sore throat. Eyes: Negative for redness and visual disturbance.    Respiratory: Negative for shortness of breath. Cardiovascular: Negative for leg swelling. Gastrointestinal: Negative for abdominal pain, nausea and vomiting. Genitourinary: Negative for difficulty urinating and frequency. Musculoskeletal: Positive for arthralgias and myalgias. Negative for back pain and neck stiffness. Skin: Negative for rash. Neurological: Negative for dizziness, syncope, weakness and headaches. Hematological: Negative for adenopathy. All other systems reviewed and are negative. Physical Exam   Physical Exam  Vitals and nursing note reviewed. Constitutional:       Appearance: Normal appearance. She is well-developed. HENT:      Head: Normocephalic and atraumatic. Eyes:      Conjunctiva/sclera: Conjunctivae normal.   Cardiovascular:      Rate and Rhythm: Normal rate and regular rhythm. Pulses: Normal pulses. Heart sounds: Normal heart sounds, S1 normal and S2 normal.   Pulmonary:      Effort: Pulmonary effort is normal. No respiratory distress. Breath sounds: Normal breath sounds. No wheezing. Abdominal:      General: Bowel sounds are normal. There is no distension. Palpations: Abdomen is soft. Tenderness: There is no abdominal tenderness. There is no rebound. Musculoskeletal:      Right forearm: Tenderness present. No swelling, deformity or bony tenderness. Left forearm: Normal.      Right wrist: Swelling (Mild), tenderness (Tenderness with palpation of median nerve) and bony tenderness present. No deformity. Decreased range of motion. Left wrist: Normal.      Cervical back: Full passive range of motion without pain, normal range of motion and neck supple. Skin:     General: Skin is warm and dry. Findings: No rash. Neurological:      Mental Status: She is alert and oriented to person, place, and time. Psychiatric:         Speech: Speech normal.         Behavior: Behavior normal.         Thought Content:  Thought content normal.         Judgment: Judgment normal.       Diagnostic Study Results   Labs -   No results found for this or any previous visit (from the past 12 hour(s)). Radiologic Studies -   No orders to display     No results found. Medical Decision Making   I am the first provider for this patient. I reviewed the vital signs, available nursing notes, past medical history, past surgical history, family history and social history. Vital Signs-Reviewed the patient's vital signs. Patient Vitals for the past 24 hrs:   Temp Pulse Resp BP SpO2   12/20/21 0801 99.1 °F (37.3 °C) 83 19 132/69 98 %     Pulse Oximetry Analysis - 100% on RA (normal)    Records Reviewed: Nursing Notes and Old Medical Records    Provider Notes (Medical Decision Making):   69-year-old female presents with atraumatic right wrist and forearm pain as well as numbness and tingling in the right hand and fingers for the past 3 days. She has reproducible tenderness over the right median nerve and symptoms consistent with carpal tunnel syndrome. Will treat with a brace and NSAIDs and have her follow-up with outpatient primary care and/or orthopedic surgery as needed. ED Course:   Initial assessment performed. The patients presenting problems have been discussed, and they are in agreement with the care plan formulated and outlined with them. I have encouraged them to ask questions as they arise throughout their visit. Progress Note:   Updated pt on all returned results and findings. Discussed the importance of proper follow up as referred below along with return precautions. Pt in agreement with the care plan and expresses agreement with and understanding of all items discussed. Disposition:  Discharge Note:  The pt is ready for discharge. The pt's signs, symptoms, diagnosis, and discharge instructions have been discussed and pt has conveyed their understanding. The pt is to follow up as recommended or return to ER should their symptoms worsen.  Plan has been discussed and pt is in agreement. PLAN:  1. Current Discharge Medication List      START taking these medications    Details   naproxen (NAPROSYN) 500 mg tablet Take 1 Tablet by mouth every twelve (12) hours as needed for Pain. Qty: 20 Tablet, Refills: 0  Start date: 12/20/2021           2. Follow-up Information     Follow up With Specialties Details Why Contact Info    Kirby Cui MD Family Medicine Schedule an appointment as soon as possible for a visit   [address] [phone]      Kenan Singh DO Orthopedic Surgery Schedule an appointment as soon as possible for a visit   [address] [facility]  [phone]      [facility] EMERGENCY DEPT Emergency Medicine  As needed, If symptoms worsen [place]  [phone]        Return to ED if worse     Diagnosis     Clinical Impression:   1. Carpal tunnel syndrome of right wrist            Please note that this dictation was completed with Dragon, computer voice recognition software. Quite often unanticipated grammatical, syntax, homophones, and other interpretive errors are inadvertently transcribed by the computer software. Please disregard these errors. Additionally, please excuse any errors that have escaped final proofreading.

## 2021-12-21 DIAGNOSIS — I10 ESSENTIAL HYPERTENSION: ICD-10-CM

## 2021-12-21 DIAGNOSIS — N95.1 HOT FLASHES, MENOPAUSAL: ICD-10-CM

## 2021-12-21 DIAGNOSIS — R73.03 PREDIABETES: ICD-10-CM

## 2021-12-21 DIAGNOSIS — E78.00 HYPERCHOLESTEROLEMIA: ICD-10-CM

## 2021-12-21 DIAGNOSIS — G47.00 INSOMNIA, UNSPECIFIED TYPE: ICD-10-CM

## 2021-12-22 RX ORDER — HYDROCHLOROTHIAZIDE 25 MG/1
TABLET ORAL
Qty: 90 TABLET | Refills: 0 | Status: SHIPPED | OUTPATIENT
Start: 2021-12-22 | End: 2022-03-28 | Stop reason: SDUPTHER

## 2022-01-03 ENCOUNTER — OFFICE VISIT (OUTPATIENT)
Dept: FAMILY MEDICINE CLINIC | Age: 72
End: 2022-01-03
Payer: MEDICARE

## 2022-01-03 VITALS
WEIGHT: 153.6 LBS | RESPIRATION RATE: 16 BRPM | DIASTOLIC BLOOD PRESSURE: 81 MMHG | SYSTOLIC BLOOD PRESSURE: 138 MMHG | OXYGEN SATURATION: 95 % | BODY MASS INDEX: 27.21 KG/M2 | HEART RATE: 85 BPM

## 2022-01-03 DIAGNOSIS — R93.7 ABNORMAL BONE DENSITY SCREENING: ICD-10-CM

## 2022-01-03 DIAGNOSIS — M25.561 ACUTE PAIN OF RIGHT KNEE: ICD-10-CM

## 2022-01-03 DIAGNOSIS — M25.531 RIGHT WRIST PAIN: ICD-10-CM

## 2022-01-03 DIAGNOSIS — Z12.11 SCREEN FOR COLON CANCER: ICD-10-CM

## 2022-01-03 DIAGNOSIS — E78.00 HYPERCHOLESTEROLEMIA: ICD-10-CM

## 2022-01-03 DIAGNOSIS — Z00.00 MEDICARE ANNUAL WELLNESS VISIT, SUBSEQUENT: Primary | ICD-10-CM

## 2022-01-03 DIAGNOSIS — Z23 ENCOUNTER FOR IMMUNIZATION: ICD-10-CM

## 2022-01-03 DIAGNOSIS — Z71.89 ADVANCED DIRECTIVES, COUNSELING/DISCUSSION: ICD-10-CM

## 2022-01-03 DIAGNOSIS — M85.80 OSTEOPENIA, UNSPECIFIED LOCATION: ICD-10-CM

## 2022-01-03 PROCEDURE — 1101F PT FALLS ASSESS-DOCD LE1/YR: CPT | Performed by: FAMILY MEDICINE

## 2022-01-03 PROCEDURE — G0439 PPPS, SUBSEQ VISIT: HCPCS | Performed by: FAMILY MEDICINE

## 2022-01-03 PROCEDURE — G8510 SCR DEP NEG, NO PLAN REQD: HCPCS | Performed by: FAMILY MEDICINE

## 2022-01-03 PROCEDURE — G8754 DIAS BP LESS 90: HCPCS | Performed by: FAMILY MEDICINE

## 2022-01-03 PROCEDURE — 1090F PRES/ABSN URINE INCON ASSESS: CPT | Performed by: FAMILY MEDICINE

## 2022-01-03 PROCEDURE — 3017F COLORECTAL CA SCREEN DOC REV: CPT | Performed by: FAMILY MEDICINE

## 2022-01-03 PROCEDURE — G8399 PT W/DXA RESULTS DOCUMENT: HCPCS | Performed by: FAMILY MEDICINE

## 2022-01-03 PROCEDURE — 99497 ADVNCD CARE PLAN 30 MIN: CPT | Performed by: FAMILY MEDICINE

## 2022-01-03 PROCEDURE — 99213 OFFICE O/P EST LOW 20 MIN: CPT | Performed by: FAMILY MEDICINE

## 2022-01-03 PROCEDURE — G8752 SYS BP LESS 140: HCPCS | Performed by: FAMILY MEDICINE

## 2022-01-03 PROCEDURE — G8419 CALC BMI OUT NRM PARAM NOF/U: HCPCS | Performed by: FAMILY MEDICINE

## 2022-01-03 PROCEDURE — G9899 SCRN MAM PERF RSLTS DOC: HCPCS | Performed by: FAMILY MEDICINE

## 2022-01-03 PROCEDURE — G8536 NO DOC ELDER MAL SCRN: HCPCS | Performed by: FAMILY MEDICINE

## 2022-01-03 PROCEDURE — G8427 DOCREV CUR MEDS BY ELIG CLIN: HCPCS | Performed by: FAMILY MEDICINE

## 2022-01-03 RX ORDER — METHYLPREDNISOLONE 4 MG/1
TABLET ORAL
Qty: 1 DOSE PACK | Refills: 0 | Status: SHIPPED | OUTPATIENT
Start: 2022-01-03 | End: 2022-01-20 | Stop reason: ALTCHOICE

## 2022-01-03 RX ORDER — ZOSTER VACCINE RECOMBINANT, ADJUVANTED 50 MCG/0.5
KIT INTRAMUSCULAR
Qty: 0.5 ML | Refills: 1 | OUTPATIENT
Start: 2022-01-03 | End: 2022-07-06

## 2022-01-03 RX ORDER — TRAMADOL HYDROCHLORIDE 50 MG/1
50 TABLET ORAL
Qty: 42 TABLET | Refills: 0 | Status: SHIPPED | OUTPATIENT
Start: 2022-01-03 | End: 2022-01-17

## 2022-01-03 NOTE — PROGRESS NOTES
Chief Complaint   Patient presents with   [facility] Annual Wellness Visit     1. Have you been to the ER, urgent care clinic since your last visit? Hospitalized since your last visit? Yes When: 12/20/21 Where: [facility] Reason for visit: right wrist pain    2. Have you seen or consulted any other health care providers outside of the [address] [place] [address] since your last visit? Include any pap smears or colon screening. No    Call placed to pt. Verified patient with two type of identifiers. C/o right wrist pain and knee pain,  Some swelling to knee x 3 weeks, denies injury, unable to bear weight to right knee,  Taking otc ibuprofen with no relief. Dilip Mclaughlin
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
## 2022-01-03 NOTE — ACP (ADVANCE CARE PLANNING)
Advance Care Planning           Advance Care Planning (ACP) Physician         Conversation Conducted with:   Patient with Decision Making Capacity     Other Legally Authorized Decision Maker would be Spouse as SDM      Patient is on presence of no family member,, stated that the pt wants to be not DNR at this time,  The pt likes to be a full code individual,  The patient states that there is a lot of will to live,  Pt was given the form,   will sign and bring us a copy on the later date.       Conversation Outcomes / Follow-Up Plan:   Completed new Advance Directive      Length of ACP Conversation in minutes:  16 minutes             Bart Londono MD

## 2022-01-03 NOTE — PATIENT INSTRUCTIONS

## 2022-01-04 LAB
ALBUMIN SERPL-MCNC: 3.9 G/DL (ref 3.5–5)
ALBUMIN/GLOB SERPL: 0.9 {RATIO} (ref 1.1–2.2)
ALP SERPL-CCNC: 84 U/L (ref 45–117)
ALT SERPL-CCNC: 13 U/L (ref 12–78)
AMORPH CRY URNS QL MICRO: ABNORMAL
ANION GAP SERPL CALC-SCNC: 6 MMOL/L (ref 5–15)
APPEARANCE UR: ABNORMAL
AST SERPL-CCNC: 6 U/L (ref 15–37)
BACTERIA URNS QL MICRO: ABNORMAL /HPF
BASOPHILS # BLD: 0 K/UL (ref 0–0.1)
BASOPHILS NFR BLD: 0 % (ref 0–1)
BILIRUB SERPL-MCNC: 0.3 MG/DL (ref 0.2–1)
BILIRUB UR QL: NEGATIVE
BUN SERPL-MCNC: 16 MG/DL (ref 6–20)
BUN/CREAT SERPL: 19 (ref 12–20)
CALCIUM SERPL-MCNC: 9.7 MG/DL (ref 8.5–10.1)
CHLORIDE SERPL-SCNC: 101 MMOL/L (ref 97–108)
CHOLEST SERPL-MCNC: 148 MG/DL
CO2 SERPL-SCNC: 31 MMOL/L (ref 21–32)
COLOR UR: ABNORMAL
COMMENT, HOLDF: NORMAL
CREAT SERPL-MCNC: 0.86 MG/DL (ref 0.55–1.02)
DIFFERENTIAL METHOD BLD: ABNORMAL
EOSINOPHIL # BLD: 0 K/UL (ref 0–0.4)
EOSINOPHIL NFR BLD: 0 % (ref 0–7)
EPITH CASTS URNS QL MICRO: ABNORMAL /LPF
ERYTHROCYTE [DISTWIDTH] IN BLOOD BY AUTOMATED COUNT: 14.7 % (ref 11.5–14.5)
FERRITIN SERPL-MCNC: 48 NG/ML (ref 26–388)
GLOBULIN SER CALC-MCNC: 4.2 G/DL (ref 2–4)
GLUCOSE SERPL-MCNC: 91 MG/DL (ref 65–100)
GLUCOSE UR STRIP.AUTO-MCNC: NEGATIVE MG/DL
HCT VFR BLD AUTO: 41.6 % (ref 35–47)
HDLC SERPL-MCNC: 53 MG/DL
HDLC SERPL: 2.8 {RATIO} (ref 0–5)
HGB BLD-MCNC: 12.4 G/DL (ref 11.5–16)
HGB UR QL STRIP: ABNORMAL
IMM GRANULOCYTES # BLD AUTO: 0.1 K/UL (ref 0–0.04)
IMM GRANULOCYTES NFR BLD AUTO: 0 % (ref 0–0.5)
KETONES UR QL STRIP.AUTO: ABNORMAL MG/DL
LDLC SERPL CALC-MCNC: 80.8 MG/DL (ref 0–100)
LEUKOCYTE ESTERASE UR QL STRIP.AUTO: ABNORMAL
LYMPHOCYTES # BLD: 2.2 K/UL (ref 0.8–3.5)
LYMPHOCYTES NFR BLD: 13 % (ref 12–49)
MCH RBC QN AUTO: 24.9 PG (ref 26–34)
MCHC RBC AUTO-ENTMCNC: 29.8 G/DL (ref 30–36.5)
MCV RBC AUTO: 83.7 FL (ref 80–99)
MONOCYTES # BLD: 0.9 K/UL (ref 0–1)
MONOCYTES NFR BLD: 5 % (ref 5–13)
NEUTS SEG # BLD: 13.9 K/UL (ref 1.8–8)
NEUTS SEG NFR BLD: 82 % (ref 32–75)
NITRITE UR QL STRIP.AUTO: POSITIVE
NRBC # BLD: 0 K/UL (ref 0–0.01)
NRBC BLD-RTO: 0 PER 100 WBC
PH UR STRIP: 5.5 [PH] (ref 5–8)
PLATELET # BLD AUTO: 517 K/UL (ref 150–400)
PMV BLD AUTO: 9.8 FL (ref 8.9–12.9)
POTASSIUM SERPL-SCNC: 4 MMOL/L (ref 3.5–5.1)
PROT SERPL-MCNC: 8.1 G/DL (ref 6.4–8.2)
PROT UR STRIP-MCNC: ABNORMAL MG/DL
RBC # BLD AUTO: 4.97 M/UL (ref 3.8–5.2)
RBC #/AREA URNS HPF: ABNORMAL /HPF (ref 0–5)
SAMPLES BEING HELD,HOLD: NORMAL
SODIUM SERPL-SCNC: 138 MMOL/L (ref 136–145)
SP GR UR REFRACTOMETRY: 1.03 (ref 1–1.03)
T4 FREE SERPL-MCNC: 0.9 NG/DL (ref 0.8–1.5)
TRIGL SERPL-MCNC: 71 MG/DL (ref ?–150)
TSH SERPL DL<=0.05 MIU/L-ACNC: 1.3 UIU/ML (ref 0.36–3.74)
UROBILINOGEN UR QL STRIP.AUTO: 0.2 EU/DL (ref 0.2–1)
VLDLC SERPL CALC-MCNC: 14.2 MG/DL
WBC # BLD AUTO: 17 K/UL (ref 3.6–11)
WBC URNS QL MICRO: ABNORMAL /HPF (ref 0–4)

## 2022-01-20 ENCOUNTER — VIRTUAL VISIT (OUTPATIENT)
Dept: FAMILY MEDICINE CLINIC | Age: 72
End: 2022-01-20
Payer: MEDICARE

## 2022-01-20 DIAGNOSIS — M81.0 AGE-RELATED OSTEOPOROSIS WITHOUT CURRENT PATHOLOGICAL FRACTURE: ICD-10-CM

## 2022-01-20 DIAGNOSIS — M94.9 DISORDER OF BONE AND CARTILAGE: ICD-10-CM

## 2022-01-20 DIAGNOSIS — Z71.89 ADVICE GIVEN ABOUT COVID-19 VIRUS INFECTION: ICD-10-CM

## 2022-01-20 DIAGNOSIS — D50.8 OTHER IRON DEFICIENCY ANEMIA: ICD-10-CM

## 2022-01-20 DIAGNOSIS — D72.9 WHITE BLOOD CELL ABNORMALITY: ICD-10-CM

## 2022-01-20 DIAGNOSIS — M25.562 CHRONIC PAIN OF BOTH KNEES: Primary | ICD-10-CM

## 2022-01-20 DIAGNOSIS — M89.9 DISORDER OF BONE AND CARTILAGE: ICD-10-CM

## 2022-01-20 DIAGNOSIS — M25.561 CHRONIC PAIN OF BOTH KNEES: Primary | ICD-10-CM

## 2022-01-20 DIAGNOSIS — N30.00 ACUTE CYSTITIS WITHOUT HEMATURIA: ICD-10-CM

## 2022-01-20 DIAGNOSIS — G89.29 CHRONIC PAIN OF BOTH KNEES: Primary | ICD-10-CM

## 2022-01-20 PROCEDURE — G8756 NO BP MEASURE DOC: HCPCS | Performed by: FAMILY MEDICINE

## 2022-01-20 PROCEDURE — 1090F PRES/ABSN URINE INCON ASSESS: CPT | Performed by: FAMILY MEDICINE

## 2022-01-20 PROCEDURE — G8399 PT W/DXA RESULTS DOCUMENT: HCPCS | Performed by: FAMILY MEDICINE

## 2022-01-20 PROCEDURE — 1101F PT FALLS ASSESS-DOCD LE1/YR: CPT | Performed by: FAMILY MEDICINE

## 2022-01-20 PROCEDURE — G8427 DOCREV CUR MEDS BY ELIG CLIN: HCPCS | Performed by: FAMILY MEDICINE

## 2022-01-20 PROCEDURE — 99214 OFFICE O/P EST MOD 30 MIN: CPT | Performed by: FAMILY MEDICINE

## 2022-01-20 PROCEDURE — G8432 DEP SCR NOT DOC, RNG: HCPCS | Performed by: FAMILY MEDICINE

## 2022-01-20 PROCEDURE — 3017F COLORECTAL CA SCREEN DOC REV: CPT | Performed by: FAMILY MEDICINE

## 2022-01-20 PROCEDURE — G9899 SCRN MAM PERF RSLTS DOC: HCPCS | Performed by: FAMILY MEDICINE

## 2022-01-20 RX ORDER — FERROUS SULFATE 325(65) MG
325 TABLET, DELAYED RELEASE (ENTERIC COATED) ORAL
Qty: 90 TABLET | Refills: 3 | Status: SHIPPED | OUTPATIENT
Start: 2022-01-20 | End: 2022-06-24 | Stop reason: SDUPTHER

## 2022-01-20 RX ORDER — PREDNISONE 10 MG/1
10 TABLET ORAL
Qty: 48 TABLET | Refills: 0 | Status: SHIPPED | OUTPATIENT
Start: 2022-01-20 | End: 2022-03-18

## 2022-01-20 RX ORDER — NITROFURANTOIN (MACROCRYSTALS) 100 MG/1
100 CAPSULE ORAL
Qty: 10 CAPSULE | Refills: 0 | Status: SHIPPED | OUTPATIENT
Start: 2022-01-20 | End: 2022-01-30

## 2022-01-20 NOTE — PROGRESS NOTES
Chief Complaint   Patient presents with    Results     1. Have you been to the ER, urgent care clinic since your last visit? Hospitalized since your last visit? No    2. Have you seen or consulted any other health care providers outside of the [address] [place] [address] since your last visit? Include any pap smears or colon screening. No    Call placed to pt. Verified patient with two type of identifiers. Requesting to discuss last lab results, C/o right knee stiffness and pain,  Pain med is not helping.
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
## 2022-01-27 ENCOUNTER — HOSPITAL ENCOUNTER (OUTPATIENT)
Dept: BONE DENSITY | Age: 72
Discharge: HOME | End: 2022-01-27
Attending: FAMILY MEDICINE
Payer: MEDICARE

## 2022-01-27 DIAGNOSIS — M81.0 AGE-RELATED OSTEOPOROSIS WITHOUT CURRENT PATHOLOGICAL FRACTURE: ICD-10-CM

## 2022-01-27 DIAGNOSIS — M89.9 DISORDER OF BONE AND CARTILAGE: ICD-10-CM

## 2022-01-27 DIAGNOSIS — M94.9 DISORDER OF BONE AND CARTILAGE: ICD-10-CM

## 2022-01-27 PROCEDURE — 77080 DXA BONE DENSITY AXIAL: CPT

## 2022-02-10 DIAGNOSIS — G47.00 INSOMNIA, UNSPECIFIED TYPE: ICD-10-CM

## 2022-02-10 DIAGNOSIS — I10 ESSENTIAL HYPERTENSION: ICD-10-CM

## 2022-02-10 DIAGNOSIS — E78.00 HYPERCHOLESTEROLEMIA: ICD-10-CM

## 2022-02-10 DIAGNOSIS — N95.1 HOT FLASHES, MENOPAUSAL: ICD-10-CM

## 2022-02-10 DIAGNOSIS — R73.03 PREDIABETES: ICD-10-CM

## 2022-02-10 RX ORDER — VENLAFAXINE 75 MG/1
75 TABLET ORAL DAILY
Qty: 30 TABLET | Refills: 0 | Status: SHIPPED | OUTPATIENT
Start: 2022-02-10 | End: 2022-04-07

## 2022-02-10 NOTE — TELEPHONE ENCOUNTER
Last visit:1/20/22  Next visit: not scheduled  Previous refill 11/26/21(30+0R)    Requested Prescriptions     Pending Prescriptions Disp Refills    venlafaxine (EFFEXOR) 75 mg tablet 30 Tablet 0     Sig: Take 1 Tablet by mouth daily.

## 2022-03-04 DIAGNOSIS — R73.03 PREDIABETES: ICD-10-CM

## 2022-03-04 DIAGNOSIS — N95.1 HOT FLASHES, MENOPAUSAL: ICD-10-CM

## 2022-03-04 DIAGNOSIS — G47.00 INSOMNIA, UNSPECIFIED TYPE: ICD-10-CM

## 2022-03-04 DIAGNOSIS — E78.00 HYPERCHOLESTEROLEMIA: ICD-10-CM

## 2022-03-04 DIAGNOSIS — I10 ESSENTIAL HYPERTENSION: ICD-10-CM

## 2022-03-04 RX ORDER — SIMVASTATIN 10 MG/1
10 TABLET, FILM COATED ORAL
Qty: 90 TABLET | Refills: 3 | Status: SHIPPED | OUTPATIENT
Start: 2022-03-04

## 2022-03-04 RX ORDER — DEXTROMETHORPHAN HYDROBROMIDE, GUAIFENESIN 5; 100 MG/5ML; MG/5ML
650 LIQUID ORAL EVERY 8 HOURS
Qty: 40 TABLET | Refills: 5 | OUTPATIENT
Start: 2022-03-04 | End: 2022-07-06

## 2022-03-04 NOTE — TELEPHONE ENCOUNTER
Pt would like refills     States her pharmacy faxed a request     Re: Simvastatin  Acetaminophen 650 mg for arthirits      She also wants extra refills so she doesn't have to request all the time    Best number to reach her is [phone]

## 2022-03-16 DIAGNOSIS — N30.00 ACUTE CYSTITIS WITHOUT HEMATURIA: ICD-10-CM

## 2022-03-16 DIAGNOSIS — D50.8 OTHER IRON DEFICIENCY ANEMIA: ICD-10-CM

## 2022-03-16 DIAGNOSIS — M25.561 CHRONIC PAIN OF BOTH KNEES: ICD-10-CM

## 2022-03-16 DIAGNOSIS — D72.9 WHITE BLOOD CELL ABNORMALITY: ICD-10-CM

## 2022-03-16 DIAGNOSIS — M25.562 CHRONIC PAIN OF BOTH KNEES: ICD-10-CM

## 2022-03-16 DIAGNOSIS — G89.29 CHRONIC PAIN OF BOTH KNEES: ICD-10-CM

## 2022-03-18 RX ORDER — PREDNISONE 10 MG/1
TABLET ORAL
Qty: 48 TABLET | Refills: 0 | OUTPATIENT
Start: 2022-03-18 | End: 2022-07-06

## 2022-03-28 DIAGNOSIS — R73.03 PREDIABETES: ICD-10-CM

## 2022-03-28 DIAGNOSIS — E78.00 HYPERCHOLESTEROLEMIA: ICD-10-CM

## 2022-03-28 DIAGNOSIS — N95.1 HOT FLASHES, MENOPAUSAL: ICD-10-CM

## 2022-03-28 DIAGNOSIS — G47.00 INSOMNIA, UNSPECIFIED TYPE: ICD-10-CM

## 2022-03-28 DIAGNOSIS — I10 ESSENTIAL HYPERTENSION: ICD-10-CM

## 2022-03-28 NOTE — TELEPHONE ENCOUNTER
Last visit:1/20/22  Next visit:not scheduled  Previous refill 12/22/21(90+0R)    Requested Prescriptions     Pending Prescriptions Disp Refills    hydroCHLOROthiazide (HYDRODIURIL) 25 mg tablet 90 Tablet 0     Sig: Take 1 Tablet by mouth daily.

## 2022-03-30 RX ORDER — HYDROCHLOROTHIAZIDE 25 MG/1
25 TABLET ORAL DAILY
Qty: 90 TABLET | Refills: 0 | Status: SHIPPED | OUTPATIENT
Start: 2022-03-30 | End: 2022-06-02 | Stop reason: SDUPTHER

## 2022-04-03 DIAGNOSIS — E78.00 HYPERCHOLESTEROLEMIA: ICD-10-CM

## 2022-04-03 DIAGNOSIS — I10 ESSENTIAL HYPERTENSION: ICD-10-CM

## 2022-04-03 DIAGNOSIS — G47.00 INSOMNIA, UNSPECIFIED TYPE: ICD-10-CM

## 2022-04-03 DIAGNOSIS — R73.03 PREDIABETES: ICD-10-CM

## 2022-04-03 DIAGNOSIS — N95.1 HOT FLASHES, MENOPAUSAL: ICD-10-CM

## 2022-04-07 RX ORDER — VENLAFAXINE 75 MG/1
TABLET ORAL
Qty: 30 TABLET | Refills: 0 | Status: SHIPPED | OUTPATIENT
Start: 2022-04-07 | End: 2022-06-02 | Stop reason: SDUPTHER

## 2022-05-18 ENCOUNTER — NURSE TRIAGE (OUTPATIENT)
Dept: OTHER | Facility: CLINIC | Age: 72
End: 2022-05-18

## 2022-05-18 NOTE — TELEPHONE ENCOUNTER
Received call from [address] at [facility] with Red Flag Complaint. Subjective: Caller states \"Headache\"     Current Symptoms: Worse headache of life, headache,  from neck go up to  back of head, shoulders and neck are stiff, still able to touch chin to chest but with pain,  throat is a little sore yesterday but better now, eyes red when first wake up in morning    Onset: 1 week ago; gradual, worsening    Associated Symptoms: NA    Pain Severity: 10/10; aching; constant    Temperature: Denies    What has been tried: Sinus medicine- no improvement    LMP: NA Pregnant: NA    Recommended disposition: Go to ED now (or to Office with PCP Approval)    Care advice provided, patient verbalizes understanding; denies any other questions or concerns; instructed to call back for any new or worsening symptoms. Writer provided warm transfer to Bob Rendon at [facility] for Second Level Triage    Attention Provider: Thank you for allowing me to participate in the care of your patient. The patient was connected to triage in response to information provided to the ECC. Please do not respond through this encounter as the response is not directed to a shared pool.         Reason for Disposition   SEVERE headache, states 'worst headache' of life    Protocols used: HEADACHE-ADULT-[place]

## 2022-06-02 ENCOUNTER — OFFICE VISIT (OUTPATIENT)
Dept: FAMILY MEDICINE CLINIC | Age: 72
End: 2022-06-02
Payer: MEDICARE

## 2022-06-02 VITALS
OXYGEN SATURATION: 95 % | SYSTOLIC BLOOD PRESSURE: 127 MMHG | HEART RATE: 84 BPM | TEMPERATURE: 98.6 F | HEIGHT: 63 IN | BODY MASS INDEX: 27.94 KG/M2 | RESPIRATION RATE: 15 BRPM | WEIGHT: 157.7 LBS | DIASTOLIC BLOOD PRESSURE: 84 MMHG

## 2022-06-02 DIAGNOSIS — R73.03 PREDIABETES: ICD-10-CM

## 2022-06-02 DIAGNOSIS — I10 ESSENTIAL HYPERTENSION: Primary | ICD-10-CM

## 2022-06-02 DIAGNOSIS — M47.26 OSTEOARTHRITIS OF SPINE WITH RADICULOPATHY, LUMBAR REGION: ICD-10-CM

## 2022-06-02 DIAGNOSIS — M54.40 ACUTE RIGHT-SIDED LOW BACK PAIN WITH SCIATICA, SCIATICA LATERALITY UNSPECIFIED: ICD-10-CM

## 2022-06-02 DIAGNOSIS — E78.00 HYPERCHOLESTEROLEMIA: ICD-10-CM

## 2022-06-02 DIAGNOSIS — N95.1 HOT FLASHES, MENOPAUSAL: ICD-10-CM

## 2022-06-02 DIAGNOSIS — N30.00 ACUTE CYSTITIS WITHOUT HEMATURIA: ICD-10-CM

## 2022-06-02 PROCEDURE — 1123F ACP DISCUSS/DSCN MKR DOCD: CPT | Performed by: FAMILY MEDICINE

## 2022-06-02 PROCEDURE — G8536 NO DOC ELDER MAL SCRN: HCPCS | Performed by: FAMILY MEDICINE

## 2022-06-02 PROCEDURE — G8399 PT W/DXA RESULTS DOCUMENT: HCPCS | Performed by: FAMILY MEDICINE

## 2022-06-02 PROCEDURE — G8417 CALC BMI ABV UP PARAM F/U: HCPCS | Performed by: FAMILY MEDICINE

## 2022-06-02 PROCEDURE — G8752 SYS BP LESS 140: HCPCS | Performed by: FAMILY MEDICINE

## 2022-06-02 PROCEDURE — G8427 DOCREV CUR MEDS BY ELIG CLIN: HCPCS | Performed by: FAMILY MEDICINE

## 2022-06-02 PROCEDURE — G8510 SCR DEP NEG, NO PLAN REQD: HCPCS | Performed by: FAMILY MEDICINE

## 2022-06-02 PROCEDURE — 3017F COLORECTAL CA SCREEN DOC REV: CPT | Performed by: FAMILY MEDICINE

## 2022-06-02 PROCEDURE — G9899 SCRN MAM PERF RSLTS DOC: HCPCS | Performed by: FAMILY MEDICINE

## 2022-06-02 PROCEDURE — G8754 DIAS BP LESS 90: HCPCS | Performed by: FAMILY MEDICINE

## 2022-06-02 PROCEDURE — 99214 OFFICE O/P EST MOD 30 MIN: CPT | Performed by: FAMILY MEDICINE

## 2022-06-02 PROCEDURE — 1101F PT FALLS ASSESS-DOCD LE1/YR: CPT | Performed by: FAMILY MEDICINE

## 2022-06-02 PROCEDURE — 1090F PRES/ABSN URINE INCON ASSESS: CPT | Performed by: FAMILY MEDICINE

## 2022-06-02 RX ORDER — METHYLPREDNISOLONE 4 MG/1
TABLET ORAL
Qty: 1 DOSE PACK | Refills: 0 | OUTPATIENT
Start: 2022-06-02 | End: 2022-07-06

## 2022-06-02 RX ORDER — HYDROCHLOROTHIAZIDE 25 MG/1
25 TABLET ORAL DAILY
Qty: 90 TABLET | Refills: 0 | Status: SHIPPED | OUTPATIENT
Start: 2022-06-02 | End: 2022-09-22 | Stop reason: SDUPTHER

## 2022-06-02 RX ORDER — PREDNISONE 10 MG/1
TABLET ORAL
Qty: 48 TABLET | Refills: 0 | Status: CANCELLED | OUTPATIENT
Start: 2022-06-02

## 2022-06-02 RX ORDER — VENLAFAXINE 75 MG/1
75 TABLET ORAL DAILY
Qty: 90 TABLET | Refills: 3 | Status: SHIPPED | OUTPATIENT
Start: 2022-06-02

## 2022-06-02 RX ORDER — TRAMADOL HYDROCHLORIDE 50 MG/1
50 TABLET ORAL
Qty: 42 TABLET | Refills: 0 | Status: SHIPPED | OUTPATIENT
Start: 2022-06-02 | End: 2022-06-16

## 2022-06-02 NOTE — PROGRESS NOTES
HISTORY OF PRESENT ILLNESS  Rere Agrawal is a 67 y.o. female, present with the Back pain for which went to ,  Has; been givne high dose pain meds and has tried all available otc none has been able to make the pt comfortable,     This is a chronic problem. Episode onset: few yrs ago, ++ obese, th pt is currently working, the pt is able to wash dishes and hang clothes, the pain does worsen by going up and down the steps . The problem occurs constantly. The problem has changed and worsening since onset. The pain is present in the lower back. The quality of the pain is described as dull. The pain is at a severity of 8/10. Associated symptoms include limited range of motion. Pertinent negatives include no numbness, ++ stiffness, no tingling b/l, no itching, + back pain and no neck pain. The symptoms are aggravated by movement and palpation. There has been no history of extremity trauma, the patient has no incontinence of urine nor of stool,     Urinary Frequency   current history is provided by the patient, stating that this is a new but recurrent problem. The current episode started more than 1 week ago. postmenop  +++ hot flashes   has tried otc not helping     + vaginal dryness, + dyspareunia,   no fhx of dvt no hx of breast cancer, no cigs no etoh abuse       Current Outpatient Medications   Medication Sig Dispense Refill    venlafaxine (EFFEXOR) 75 mg tablet TAKE ONE TABLET BY MOUTH DAILY 30 Tablet 0    hydroCHLOROthiazide (HYDRODIURIL) 25 mg tablet Take 1 Tablet by mouth daily. 90 Tablet 0    predniSONE (DELTASONE) 10 mg tablet TAKE ONE TABLET BY MOUTH DAILY WITH BREAKFAST 48 Tablet 0    simvastatin (ZOCOR) 10 mg tablet Take 1 Tablet by mouth nightly. 90 Tablet 3    ferrous sulfate (IRON) 325 mg (65 mg iron) EC tablet Take 1 Tablet by mouth three (3) times daily (with meals). 90 Tablet 3    aspirin 81 mg chewable tablet Take 1 Tab by mouth daily.  30 Tab 11    acetaminophen (Arthritis Pain Relief) 650 mg TbER Take 1 Tablet by mouth every eight (8) hours. (Patient not taking: Reported on 6/2/2022) 40 Tablet 5    varicella-zoster recombinant, PF, (Shingrix, PF,) 50 mcg/0.5 mL susr injection 0.5mL by IntraMUSCular route once now and then repeat in 2-6 months (Patient not taking: Reported on 1/20/2022) 0.5 mL 1    ergocalciferol (ERGOCALCIFEROL) 1,250 mcg (50,000 unit) capsule Take 1 Cap by mouth every seven (7) days. (Patient not taking: Reported on 1/3/2022) 4 Cap 11    vitamin B complex capsule Take 1 Cap by mouth daily. (Patient not taking: Reported on 6/2/2022) 30 Cap 4    albuterol (PROVENTIL HFA, VENTOLIN HFA, PROAIR HFA) 90 mcg/actuation inhaler Take 1 Puff by inhalation every four (4) hours as needed for Wheezing. (Patient not taking: Reported on 1/3/2022) 1 Inhaler 1    calcium-cholecalciferol, D3, (CALTRATE 600+D) tablet Take 1 Tab by mouth daily.  (Patient not taking: Reported on 6/2/2022) 30 Tab 0     No Known Allergies  Past Medical History:   Diagnosis Date    Contact dermatitis and other eczema, due to unspecified cause     Hot flashes, menopausal 4/21/2010    HTN (hypertension) 3/29/2010    Insomnia 3/29/2010    Knee pain, right 6/4/2015    Osteopenia 4/21/2010    Prediabetes 6/8/2012    Recurrent sinusitis 3/7/2016    Screening for osteoporosis 3/7/2016     Past Surgical History:   Procedure Laterality Date    COLONOSCOPY N/A 1/24/2020    COLONOSCOPY performed by Odette Lopez MD at [facility] ENDOSCOPY    COLONOSCOPY N/A 1/21/2021    COLONOSCOPY performed by Odette Lopez MD at [facility] ENDOSCOPY    ENDOSCOPY, COLON, DIAGNOSTIC      in 2009    HX BREAST BIOPSY Left 1970    benign:  no visible scar    HX HYSTERECTOMY      40's     Family History   Problem Relation Age of Onset    Stroke Mother     Hypertension Sister     Hypertension Brother     Hypertension Sister     Hypertension Brother      Social History     Tobacco Use    Smoking status: Former Smoker    Smokeless tobacco: Never Used    Tobacco comment: quit 30 yrs ago   Substance Use Topics    Alcohol use: Yes     Comment: 2 beer per week      Lab Results   Component Value Date/Time    Hemoglobin A1c 5.9 (H) 09/27/2017 12:44 PM    Hemoglobin A1c 6.5 (H) 06/17/2014 11:25 AM    Hemoglobin A1c 5.7 (H) 07/03/2013 08:26 AM    Glucose 91 01/04/2022 02:33 PM    LDL, calculated 80.8 01/04/2022 02:33 PM    Creatinine 0.86 01/04/2022 02:33 PM      Lab Results   Component Value Date/Time    Cholesterol, total 148 01/04/2022 02:33 PM    HDL Cholesterol 53 01/04/2022 02:33 PM    LDL, calculated 80.8 01/04/2022 02:33 PM    Triglyceride 71 01/04/2022 02:33 PM    CHOL/HDL Ratio 2.8 01/04/2022 02:33 PM        Review of Systems   Constitutional: Negative for chills and fever. HENT: Negative for congestion and nosebleeds. Eyes: Negative for blurred vision and pain. Respiratory: Negative for cough, shortness of breath and wheezing. Cardiovascular: Negative for chest pain and leg swelling. Gastrointestinal: Negative for constipation, diarrhea, nausea and vomiting. Genitourinary: Negative for dysuria and frequency. Musculoskeletal: Positive for back pain and joint pain. Negative for myalgias. Skin: Negative for itching and rash. Neurological: Negative for dizziness, loss of consciousness and headaches. Psychiatric/Behavioral: Negative for depression. The patient is not nervous/anxious and does not have insomnia. Physical Exam  Vitals and nursing note reviewed. Constitutional:       Appearance: She is well-developed. HENT:      Head: Normocephalic and atraumatic. Eyes:      Conjunctiva/sclera: Conjunctivae normal.      Pupils: Pupils are equal, round, and reactive to light. Neck:      Thyroid: No thyromegaly. Vascular: No JVD. Cardiovascular:      Rate and Rhythm: Normal rate and regular rhythm. Heart sounds: Normal heart sounds. No murmur heard. No friction rub. No gallop.     Pulmonary: Effort: Pulmonary effort is normal. No respiratory distress. Breath sounds: Normal breath sounds. No stridor. No wheezing or rales. Abdominal:      General: Bowel sounds are normal. There is no distension. Palpations: Abdomen is soft. There is no mass. Tenderness: There is no abdominal tenderness. Musculoskeletal:         General: No tenderness. Normal range of motion. Lymphadenopathy:      Cervical: No cervical adenopathy. Skin:     Findings: No erythema or rash. Neurological:      Mental Status: She is alert and oriented to person, place, and time. Cranial Nerves: No cranial nerve deficit. Deep Tendon Reflexes: Reflexes are normal and symmetric. Psychiatric:         Behavior: Behavior normal.         ASSESSMENT and PLAN  Diagnoses and all orders for this visit:    1. Essential hypertension  -     hydroCHLOROthiazide (HYDRODIURIL) 25 mg tablet; Take 1 Tablet by mouth daily. -     venlafaxine (EFFEXOR) 75 mg tablet; Take 1 Tablet by mouth daily.  -     AMB POC URINALYSIS DIP STICK AUTO W/ MICRO  -     traMADoL (ULTRAM) 50 mg tablet; Take 1 Tablet by mouth every six (6) hours as needed for Pain for up to 14 days. Max Daily Amount: 200 mg. Indications: pain    2. Hot flashes, menopausal  -     hydroCHLOROthiazide (HYDRODIURIL) 25 mg tablet; Take 1 Tablet by mouth daily. -     venlafaxine (EFFEXOR) 75 mg tablet; Take 1 Tablet by mouth daily.  -     AMB POC URINALYSIS DIP STICK AUTO W/ MICRO  -     traMADoL (ULTRAM) 50 mg tablet; Take 1 Tablet by mouth every six (6) hours as needed for Pain for up to 14 days. Max Daily Amount: 200 mg. Indications: pain    3. Hypercholesterolemia  -     hydroCHLOROthiazide (HYDRODIURIL) 25 mg tablet; Take 1 Tablet by mouth daily. -     venlafaxine (EFFEXOR) 75 mg tablet; Take 1 Tablet by mouth daily.  -     AMB POC URINALYSIS DIP STICK AUTO W/ MICRO  -     traMADoL (ULTRAM) 50 mg tablet;  Take 1 Tablet by mouth every six (6) hours as needed for Pain for up to 14 days. Max Daily Amount: 200 mg. Indications: pain    4. Prediabetes  -     hydroCHLOROthiazide (HYDRODIURIL) 25 mg tablet; Take 1 Tablet by mouth daily. -     venlafaxine (EFFEXOR) 75 mg tablet; Take 1 Tablet by mouth daily.  -     AMB POC URINALYSIS DIP STICK AUTO W/ MICRO  -     traMADoL (ULTRAM) 50 mg tablet; Take 1 Tablet by mouth every six (6) hours as needed for Pain for up to 14 days. Max Daily Amount: 200 mg. Indications: pain    5. Acute cystitis without hematuria  -     AMB POC URINALYSIS DIP STICK AUTO W/ MICRO  -     traMADoL (ULTRAM) 50 mg tablet; Take 1 Tablet by mouth every six (6) hours as needed for Pain for up to 14 days. Max Daily Amount: 200 mg. Indications: pain    6. Acute right-sided low back pain with sciatica, sciatica laterality unspecified  -     AMB POC URINALYSIS DIP STICK AUTO W/ MICRO  -     XR SPINE LUMB 2 OR 3 V; Future  -     methylPREDNISolone (MEDROL DOSEPACK) 4 mg tablet; As directed for 6 days one package  -     traMADoL (ULTRAM) 50 mg tablet; Take 1 Tablet by mouth every six (6) hours as needed for Pain for up to 14 days. Max Daily Amount: 200 mg. Indications: pain  -     REFERRAL TO ORTHOPEDICS    7. Osteoarthritis of spine with radiculopathy, lumbar region  -     methylPREDNISolone (MEDROL DOSEPACK) 4 mg tablet;  As directed for 6 days one package  -     REFERRAL TO ORTHOPEDICS            With risk-benefit analysis, cs med was prescribed and was told to be considering available nonpharmacologic,   Patient was told that the medication is not safe was told not to operate any machinery while taking the medication meanwhile emphasized that the pt should take the medication as needed not on a regular basis avoid alcohol intake and avoid other medication that could potentiate its effect, regardless patient was told any other medication given by any other doctor the pt need to call primary care for further advice`  Signed informed consent,   signed cs treatment agreement,   patient acknowledged understanding and agreed with today's recommendation

## 2022-06-02 NOTE — PROGRESS NOTES
Chief Complaint   Patient presents with    Neck Pain     left leg pain, hurts when she walks        1. \"Have you been to the ER, urgent care clinic since your last visit? Hospitalized since your last visit? \" Yes When: 05/2022 Where: [facility] Reason for visit: Pain in the back of head     2. \"Have you seen or consulted any other health care providers outside of the [address] [place] [address] since your last visit? \" Yes Where: [facility]      3. For patients aged 39-70: Has the patient had a colonoscopy / FIT/ Cologuard? Yes - no Care Gap present      If the patient is female:    4. For patients aged 41-77: Has the patient had a mammogram within the past 2 years? Yes - no Care Gap present      5. For patients aged 21-65: Has the patient had a pap smear?  No    Health Maintenance Due   Topic Date Due    COVID-19 Vaccine (1) Never done    DTaP/Tdap/Td series (1 - Tdap) Never done    Shingrix Vaccine Age 50> (1 of 2) Never done    A1C test (Diabetic or Prediabetic)  04/02/2019

## 2022-06-03 LAB
APPEARANCE UR: ABNORMAL
BACTERIA URNS QL MICRO: ABNORMAL /HPF
BILIRUB UR QL: NEGATIVE
COLOR UR: ABNORMAL
EPITH CASTS URNS QL MICRO: ABNORMAL /LPF
GLUCOSE UR STRIP.AUTO-MCNC: NEGATIVE MG/DL
HGB UR QL STRIP: ABNORMAL
KETONES UR QL STRIP.AUTO: NEGATIVE MG/DL
LEUKOCYTE ESTERASE UR QL STRIP.AUTO: ABNORMAL
NITRITE UR QL STRIP.AUTO: NEGATIVE
PH UR STRIP: 6 [PH] (ref 5–8)
PROT UR STRIP-MCNC: NEGATIVE MG/DL
RBC #/AREA URNS HPF: ABNORMAL /HPF (ref 0–5)
SP GR UR REFRACTOMETRY: 1.02 (ref 1–1.03)
UROBILINOGEN UR QL STRIP.AUTO: 0.2 EU/DL (ref 0.2–1)
WBC URNS QL MICRO: ABNORMAL /HPF (ref 0–4)

## 2022-06-09 ENCOUNTER — TRANSCRIBE ORDER (OUTPATIENT)
Dept: SCHEDULING | Age: 72
End: 2022-06-09

## 2022-06-09 DIAGNOSIS — Z12.31 SCREENING MAMMOGRAM FOR HIGH-RISK PATIENT: Primary | ICD-10-CM

## 2022-06-24 DIAGNOSIS — M25.561 CHRONIC PAIN OF BOTH KNEES: ICD-10-CM

## 2022-06-24 DIAGNOSIS — D72.9 WHITE BLOOD CELL ABNORMALITY: ICD-10-CM

## 2022-06-24 DIAGNOSIS — M25.562 CHRONIC PAIN OF BOTH KNEES: ICD-10-CM

## 2022-06-24 DIAGNOSIS — D50.8 OTHER IRON DEFICIENCY ANEMIA: ICD-10-CM

## 2022-06-24 DIAGNOSIS — G89.29 CHRONIC PAIN OF BOTH KNEES: ICD-10-CM

## 2022-06-24 DIAGNOSIS — N30.00 ACUTE CYSTITIS WITHOUT HEMATURIA: ICD-10-CM

## 2022-06-24 NOTE — TELEPHONE ENCOUNTER
Last visit:6/02/22  Next visit: not scheduled  Previous refill 1/20/22(90+3R)    Requested Prescriptions     Pending Prescriptions Disp Refills    ferrous sulfate (IRON) 325 mg (65 mg iron) EC tablet 90 Tablet 3     Sig: Take 1 Tablet by mouth three (3) times daily (with meals).      For Pharmacy Admin Tracking Only     Intervention Detail: New Rx: 1, reason: Patient Preference   Time Spent (min): 5

## 2022-06-27 RX ORDER — FERROUS SULFATE 325(65) MG
325 TABLET, DELAYED RELEASE (ENTERIC COATED) ORAL
Qty: 90 TABLET | Refills: 3 | Status: SHIPPED | OUTPATIENT
Start: 2022-06-27

## 2022-06-27 NOTE — TELEPHONE ENCOUNTER
----- Message from Juan Vasquez sent at 6/24/2022 12:50 PM EDT -----  Subject: Refill Request    QUESTIONS  Name of Medication? ferrous sulfate (IRON) 325 mg (65 mg iron) EC tablet  Patient-reported dosage and instructions? 325 mg (65 mg iron) EC Tablet 3x   daily  How many days do you have left? 0  Preferred Pharmacy? [facility] [id number]  Pharmacy phone number (if available)? [phone]  Additional Information for Provider? pt has been out for over a week,   stated pharmacy said they sent several requests. PT is requesting a few   refills added to bottle as well  ---------------------------------------------------------------------------  --------------  CALL BACK INFO  What is the best way for the office to contact you? OK to leave message on   voicemail  Preferred Call Back Phone Number? [phone]  ---------------------------------------------------------------------------  --------------  SCRIPT ANSWERS  Relationship to Patient?  Self

## 2022-07-06 ENCOUNTER — HOSPITAL ENCOUNTER (EMERGENCY)
Age: 72
Discharge: HOME | End: 2022-07-06
Attending: EMERGENCY MEDICINE
Payer: MEDICARE

## 2022-07-06 VITALS
DIASTOLIC BLOOD PRESSURE: 64 MMHG | TEMPERATURE: 99.5 F | HEIGHT: 63 IN | BODY MASS INDEX: 27.11 KG/M2 | WEIGHT: 153 LBS | SYSTOLIC BLOOD PRESSURE: 135 MMHG | OXYGEN SATURATION: 97 % | HEART RATE: 91 BPM | RESPIRATION RATE: 18 BRPM

## 2022-07-06 DIAGNOSIS — M25.50 PAIN IN JOINT, MULTIPLE SITES: Primary | ICD-10-CM

## 2022-07-06 PROCEDURE — 96372 THER/PROPH/DIAG INJ SC/IM: CPT

## 2022-07-06 PROCEDURE — 99284 EMERGENCY DEPT VISIT MOD MDM: CPT

## 2022-07-06 PROCEDURE — 74011250636 HC RX REV CODE- 250/636: Performed by: PHYSICIAN ASSISTANT

## 2022-07-06 RX ORDER — DEXAMETHASONE SODIUM PHOSPHATE 100 MG/10ML
10 INJECTION INTRAMUSCULAR; INTRAVENOUS
Status: COMPLETED | OUTPATIENT
Start: 2022-07-06 | End: 2022-07-06

## 2022-07-06 RX ORDER — LIDOCAINE 50 MG/G
PATCH TOPICAL
Qty: 10 EACH | Refills: 0 | Status: SHIPPED | OUTPATIENT
Start: 2022-07-06

## 2022-07-06 RX ORDER — PREDNISONE 5 MG/1
TABLET ORAL
Qty: 21 TABLET | Refills: 0 | Status: SHIPPED | OUTPATIENT
Start: 2022-07-06

## 2022-07-06 RX ADMIN — DEXAMETHASONE SODIUM PHOSPHATE 10 MG: 10 INJECTION INTRAMUSCULAR; INTRAVENOUS at 09:20

## 2022-07-06 NOTE — ED TRIAGE NOTES
Triage Note: Patient arrives to ER complaining of generalized joint pain that has been intermittent. Was seen 12/20/21 for similar symptoms. Has followed up with PCP but states pain has gotten worse since yesterday.

## 2022-07-06 NOTE — ED NOTES
Patient arrives to ED from home for c/o generalized joint pain. Patient states history of same in December and prescribed \"a pack\" and felt better. Patient states PCP unable to see her today. Patient denies fever, cough, shortness of breath, nausea, or vomiting. Emergency Department Nursing Plan of Care       The Nursing Plan of Care is developed from the Nursing assessment and Emergency Department Attending provider initial evaluation. The plan of care may be reviewed in the ED Provider note.     The Plan of Care was developed with the following considerations:   Patient / Family readiness to learn indicated by:verbalized understanding  Persons(s) to be included in education: patient  Barriers to Learning/Limitations:No    Signed     eBtty Ahumada RN    7/6/2022   9:28 AM

## 2022-07-06 NOTE — ED PROVIDER NOTES
EMERGENCY DEPARTMENT HISTORY AND PHYSICAL EXAM    Date: 7/6/2022  Patient Name: Ana Martínez    History of Presenting Illness     Chief Complaint   Patient presents with    Joint Pain         History Provided By: Patient    HPI: Ana Martínez is a 67 y.o. female with a PMH of hypertension, insomnia, osteopenia who presents with recurrent multiple joint pain that started yesterday. Patient states she has had 2 other incidences that this is happened 1 in December and 1 in March. She states that she has not had any labs or imaging to determine the cause. She denies any injury or trauma and states that the left wrist is the worst pain but she has pain in \"all of her joints. \"  Pain exacerbated with ambulation or movement. She rates discomfort 10 out of 10. PCP: Yong Saldana MD    Current Outpatient Medications   Medication Sig Dispense Refill    predniSONE (STERAPRED) 5 mg dose pack See administration instruction per 5mg dose pack 21 Tablet 0    lidocaine (LIDODERM) 5 % Apply patch to the affected area for 12 hours a day and remove for 12 hours a day. 10 Each 0    ferrous sulfate (IRON) 325 mg (65 mg iron) EC tablet Take 1 Tablet by mouth three (3) times daily (with meals). 90 Tablet 3    hydroCHLOROthiazide (HYDRODIURIL) 25 mg tablet Take 1 Tablet by mouth daily. 90 Tablet 0    venlafaxine (EFFEXOR) 75 mg tablet Take 1 Tablet by mouth daily. 90 Tablet 3    simvastatin (ZOCOR) 10 mg tablet Take 1 Tablet by mouth nightly. 90 Tablet 3    aspirin 81 mg chewable tablet Take 1 Tab by mouth daily.  30 Tab 11       Past History     Past Medical History:  Past Medical History:   Diagnosis Date    Contact dermatitis and other eczema, due to unspecified cause     Hot flashes, menopausal 4/21/2010    HTN (hypertension) 3/29/2010    Insomnia 3/29/2010    Knee pain, right 6/4/2015    Osteopenia 4/21/2010    Prediabetes 6/8/2012    Recurrent sinusitis 3/7/2016    Screening for osteoporosis 3/7/2016 Past Surgical History:  Past Surgical History:   Procedure Laterality Date    COLONOSCOPY N/A 1/24/2020    COLONOSCOPY performed by Darlene Nicholas MD at [facility] ENDOSCOPY    COLONOSCOPY N/A 1/21/2021    COLONOSCOPY performed by Darlene Nicholas MD at [facility] ENDOSCOPY    ENDOSCOPY, COLON, DIAGNOSTIC      in 2009    HX BREAST BIOPSY Left 1970    benign:  no visible scar    HX HYSTERECTOMY      40's       Family History:  Family History   Problem Relation Age of Onset   Zannie Cowden Stroke Mother     Hypertension Sister     Hypertension Brother     Hypertension Sister     Hypertension Brother        Social History:  Social History     Tobacco Use    Smoking status: Former Smoker    Smokeless tobacco: Never Used    Tobacco comment: quit 30 yrs ago   Vaping Use    Vaping Use: Never used   Substance Use Topics    Alcohol use: Yes     Comment: 2 beer per week    Drug use: No       Allergies:  No Known Allergies      Review of Systems   Review of Systems   Musculoskeletal: Positive for arthralgias and joint swelling. Skin: Negative. Allergic/Immunologic: Negative for immunocompromised state. Neurological: Negative for speech difficulty and weakness. Psychiatric/Behavioral: Negative for self-injury. All other systems reviewed and are negative. Physical Exam     Vitals:    07/06/22 0846   BP: 135/64   Pulse: 91   Resp: 18   Temp: 99.5 °F (37.5 °C)   SpO2: 97%   Weight: 69.4 kg (153 lb)   Height: 5' 3\" (1.6 m)     Physical Exam  Vitals and nursing note reviewed. Constitutional:       General: She is not in acute distress. Appearance: She is well-developed. HENT:      Head: Normocephalic and atraumatic. Eyes:      Conjunctiva/sclera: Conjunctivae normal.   Cardiovascular:      Rate and Rhythm: Normal rate and regular rhythm. Heart sounds: Normal heart sounds. Pulmonary:      Effort: Pulmonary effort is normal. No respiratory distress. Breath sounds: Normal breath sounds.  No wheezing or rales. Musculoskeletal:      Right shoulder: Tenderness present. Normal pulse. Left shoulder: Tenderness present. Normal pulse. Right wrist: Normal pulse. Left wrist: Swelling (mild) present. Decreased range of motion. Normal pulse. Right knee: Tenderness present. Left knee: Tenderness present. Right ankle: Normal.      Left ankle: Normal.   Skin:     General: Skin is warm and dry. Neurological:      Mental Status: She is alert and oriented to person, place, and time. Psychiatric:         Behavior: Behavior normal.         Thought Content: Thought content normal.         Judgment: Judgment normal.           Diagnostic Study Results     Labs -   No results found for this or any previous visit (from the past 12 hour(s)). Radiologic Studies -   No orders to display     CT Results  (Last 48 hours)    None        CXR Results  (Last 48 hours)    None            Medical Decision Making   I am the first provider for this patient. I reviewed the vital signs, available nursing notes, past medical history, past surgical history, family history and social history. Vital Signs-Reviewed the patient's vital signs. Records Reviewed: Nursing Notes and Old Medical Records    Provider Notes (Medical Decision Making):   Patient presents with recurrent multiple joint pain that started yesterday. She has had no injury or trauma. DDx: RA, arthralgia, fibromyalgia, DJD, less likely viral illness. Will treat symptomatically but patient advised that she may need some blood work to determine etiology since this is a recurring issue. We will send home with steroids and lidocaine patches to use as needed          Disposition:  Discharged    DISCHARGE NOTE:   9:43 AM      Care plan outlined and precautions discussed. Patient has no new complaints, changes, or physical findings. All medications were reviewed with the patient; will d/c home.  All of pt's questions and concerns were addressed. Patient was instructed and agrees to follow up with PCP, as well as to return to the ED upon further deterioration. Patient is ready to go home. Follow-up Information     Follow up With Specialties Details Why Contact Info    Radhika Cardoza MD Family Medicine Schedule an appointment as soon as possible for a visit   Robert [address]             Current Discharge Medication List      START taking these medications    Details   predniSONE (STERAPRED) 5 mg dose pack See administration instruction per 5mg dose pack  Qty: 21 Tablet, Refills: 0  Start date: 7/6/2022      lidocaine (LIDODERM) 5 % Apply patch to the affected area for 12 hours a day and remove for 12 hours a day. Qty: 10 Each, Refills: 0  Start date: 7/6/2022             Procedures:  Procedures    Please note that this dictation was completed with Dragon, computer voice recognition software. Quite often unanticipated grammatical, syntax, homophones, and other interpretive errors are inadvertently transcribed by the computer software. Please disregard these errors. Additionally, please excuse any errors that have escaped final proofreading. Diagnosis     Clinical Impression:   1.  Pain in joint, multiple sites

## 2022-07-29 ENCOUNTER — HOSPITAL ENCOUNTER (EMERGENCY)
Age: 72
Discharge: HOME | End: 2022-07-29
Attending: EMERGENCY MEDICINE
Payer: MEDICARE

## 2022-07-29 ENCOUNTER — APPOINTMENT (OUTPATIENT)
Dept: GENERAL RADIOLOGY | Age: 72
End: 2022-07-29
Attending: NURSE PRACTITIONER
Payer: MEDICARE

## 2022-07-29 VITALS
DIASTOLIC BLOOD PRESSURE: 68 MMHG | SYSTOLIC BLOOD PRESSURE: 115 MMHG | WEIGHT: 156 LBS | BODY MASS INDEX: 27.64 KG/M2 | RESPIRATION RATE: 16 BRPM | HEIGHT: 63 IN | HEART RATE: 85 BPM | OXYGEN SATURATION: 96 % | TEMPERATURE: 99.7 F

## 2022-07-29 DIAGNOSIS — M19.079 ARTHRITIS OF METATARSOPHALANGEAL (MTP) JOINT OF GREAT TOE: Primary | ICD-10-CM

## 2022-07-29 PROCEDURE — 96372 THER/PROPH/DIAG INJ SC/IM: CPT

## 2022-07-29 PROCEDURE — 99281 EMR DPT VST MAYX REQ PHY/QHP: CPT

## 2022-07-29 PROCEDURE — 73620 X-RAY EXAM OF FOOT: CPT

## 2022-07-29 PROCEDURE — 74011250636 HC RX REV CODE- 250/636: Performed by: NURSE PRACTITIONER

## 2022-07-29 PROCEDURE — 99284 EMERGENCY DEPT VISIT MOD MDM: CPT

## 2022-07-29 RX ORDER — DEXTROMETHORPHAN HYDROBROMIDE, GUAIFENESIN 5; 100 MG/5ML; MG/5ML
650 LIQUID ORAL EVERY 8 HOURS
Qty: 20 TABLET | Refills: 0 | Status: SHIPPED | OUTPATIENT
Start: 2022-07-29

## 2022-07-29 RX ORDER — KETOROLAC TROMETHAMINE 30 MG/ML
15 INJECTION, SOLUTION INTRAMUSCULAR; INTRAVENOUS
Status: COMPLETED | OUTPATIENT
Start: 2022-07-29 | End: 2022-07-29

## 2022-07-29 RX ORDER — DICLOFENAC SODIUM 75 MG/1
75 TABLET, DELAYED RELEASE ORAL 2 TIMES DAILY
Qty: 30 TABLET | Refills: 0 | Status: SHIPPED
Start: 2022-07-29 | End: 2022-09-07 | Stop reason: ALTCHOICE

## 2022-07-29 RX ADMIN — KETOROLAC TROMETHAMINE 15 MG: 30 INJECTION, SOLUTION INTRAMUSCULAR; INTRAVENOUS at 14:22

## 2022-07-29 NOTE — ED NOTES
Pt's  was verbally abusive towards RN because he has been waiting to see ED provider for \"too long. \" He was escorted out of ED by RN.

## 2022-07-29 NOTE — ED NOTES
Discharge instructions provided. Pt was given copy of discharge instructions with 0 paper script(s) and 2 electronic script(s). Pt verbalized understanding of the medication instructions, and the importance of following up as recommended by EDP. Pt has no further questions at this time. Wheelchair offered from treatment area to hospital entrance, pt accepted. Pt leaving ED via wheelchair in stable condition.

## 2022-07-29 NOTE — ED NOTES
Pt arrives to the ED AAOX4 with a c/c of right foot pain, redness, and swelling onset last night. Pt denies any injury. Pt states she was seen in ED due to bilateral wrist and knee pain in the past and was prescribed Steroids which helped him for two weeks and then her pain returned. Pt is noted in stable condition, now in ED room with side rail up, bed to lowest position, and call light within reach. Will continue to monitor and wait for ED provider evaluation. Emergency Department Nursing Plan of Care       The Nursing Plan of Care is developed from the Nursing assessment and Emergency Department Attending provider initial evaluation. The plan of care may be reviewed in the ED Provider note.     The Plan of Care was developed with the following considerations:   Patient / Family readiness to learn indicated by:verbalized understanding  Persons(s) to be included in education: patient  Barriers to Learning/Limitations:No    Signed     Artice Quiver    7/29/2022   12:07 PM

## 2022-07-31 NOTE — ED PROVIDER NOTES
EMERGENCY DEPARTMENT HISTORY AND PHYSICAL EXAM    Date: 7/29/2022  Patient Name: Anusha Gage    History of Presenting Illness     Chief Complaint   Patient presents with    Foot Pain         History Provided By: Patient    Chief Complaint: foot pain  Duration: onset yesterday     Timing:  Acute  Location: right foot  Quality: Aching  Severity: 8 out of 10  Modifying Factors: walking worsens pain  Associated Symptoms:  left wrist pain      HPI: Anusha Gage is a 67 y.o. female with a PMH of  HTN prediabetes  who presents with right foot pain acute onset yesterday. Denies injury. Pain worse with walking. Also reports pain right wrist ulnar aspect. Denies injury. PCP: Darron Lopez MD    Current Outpatient Medications   Medication Sig Dispense Refill    acetaminophen (Tylenol Arthritis Pain) 650 mg TbER Take 1 Tablet by mouth every eight (8) hours. 20 Tablet 0    diclofenac EC (VOLTAREN) 75 mg EC tablet Take 1 Tablet by mouth two (2) times a day. 30 Tablet 0    predniSONE (STERAPRED) 5 mg dose pack See administration instruction per 5mg dose pack 21 Tablet 0    lidocaine (LIDODERM) 5 % Apply patch to the affected area for 12 hours a day and remove for 12 hours a day. 10 Each 0    ferrous sulfate (IRON) 325 mg (65 mg iron) EC tablet Take 1 Tablet by mouth three (3) times daily (with meals). 90 Tablet 3    hydroCHLOROthiazide (HYDRODIURIL) 25 mg tablet Take 1 Tablet by mouth daily. 90 Tablet 0    venlafaxine (EFFEXOR) 75 mg tablet Take 1 Tablet by mouth daily. 90 Tablet 3    simvastatin (ZOCOR) 10 mg tablet Take 1 Tablet by mouth nightly. 90 Tablet 3    aspirin 81 mg chewable tablet Take 1 Tab by mouth daily.  30 Tab 11       Past History     Past Medical History:  Past Medical History:   Diagnosis Date    Contact dermatitis and other eczema, due to unspecified cause     Hot flashes, menopausal 4/21/2010    HTN (hypertension) 3/29/2010    Insomnia 3/29/2010    Knee pain, right 6/4/2015    Osteopenia 4/21/2010    Prediabetes 6/8/2012    Recurrent sinusitis 3/7/2016    Screening for osteoporosis 3/7/2016       Past Surgical History:  Past Surgical History:   Procedure Laterality Date    COLONOSCOPY N/A 1/24/2020    COLONOSCOPY performed by Amandeep Alberto MD at [facility] ENDOSCOPY    COLONOSCOPY N/A 1/21/2021    COLONOSCOPY performed by Amandeep Alberto MD at [facility] ENDOSCOPY    ENDOSCOPY, COLON, DIAGNOSTIC      in 2009    HX BREAST BIOPSY Left 1970    benign:  no visible scar    HX HYSTERECTOMY      40's       Family History:  Family History   Problem Relation Age of Onset    Stroke Mother     Hypertension Sister     Hypertension Brother     Hypertension Sister     Hypertension Brother        Social History:  Social History     Tobacco Use    Smoking status: Former    Smokeless tobacco: Never    Tobacco comments:     quit 30 yrs ago   Vaping Use    Vaping Use: Never used   Substance Use Topics    Alcohol use: Yes     Comment: 2 beer per week    Drug use: No       Allergies:  No Known Allergies      Review of Systems   Review of Systems   Constitutional:  Negative for fatigue and fever. Respiratory:  Negative for shortness of breath and wheezing. Cardiovascular:  Negative for chest pain. Gastrointestinal:  Negative for abdominal pain. Musculoskeletal:  Positive for arthralgias (foot pain wrist pain). Negative for myalgias, neck pain and neck stiffness. Skin:  Negative for pallor and rash. Neurological:  Negative for dizziness, tremors and headaches. All other systems reviewed and are negative. Physical Exam     Vitals:    07/29/22 1051   BP: 115/68   Pulse: 85   Resp: 16   Temp: 99.7 °F (37.6 °C)   SpO2: 96%   Weight: 70.8 kg (156 lb)   Height: 5' 3\" (1.6 m)     Physical Exam  Vitals and nursing note reviewed. Constitutional:       General: She is not in acute distress. Appearance: Normal appearance. She is well-developed. HENT:      Head: Normocephalic and atraumatic.       Right Ear: External ear normal.      Left Ear: External ear normal.      Nose: Nose normal.      Mouth/Throat:      Mouth: Mucous membranes are moist.   Eyes:      Conjunctiva/sclera: Conjunctivae normal.   Cardiovascular:      Rate and Rhythm: Normal rate and regular rhythm. Pulmonary:      Effort: Pulmonary effort is normal. No respiratory distress. Breath sounds: Normal breath sounds. No wheezing. Abdominal:      General: Bowel sounds are normal.      Palpations: Abdomen is soft. Tenderness: There is no abdominal tenderness. Musculoskeletal:         General: Swelling and tenderness present. Normal range of motion. Cervical back: Normal range of motion and neck supple. Comments: Right great toe swelling tender to palpate. No deformity. DNV intact. + TTP  left ulna no deformity. no swelling DNV intact. Lymphadenopathy:      Cervical: No cervical adenopathy. Skin:     General: Skin is warm and dry. Findings: No rash. Neurological:      Mental Status: She is alert and oriented to person, place, and time. Cranial Nerves: No cranial nerve deficit. Coordination: Coordination normal.   Psychiatric:         Behavior: Behavior normal.         Thought Content: Thought content normal.         Judgment: Judgment normal.         Diagnostic Study Results     Labs -   No results found for this or any previous visit (from the past 12 hour(s)). Radiologic Studies -   XR FOOT RT AP/LAT   Final Result   1. No fracture. 2. Mild great toe osteoarthritis. CT Results  (Last 48 hours)      None          CXR Results  (Last 48 hours)      None              Medical Decision Making   I am the first provider for this patient. I reviewed the vital signs, available nursing notes, past medical history, past surgical history, family history and social history. Vital Signs-Reviewed the patient's vital signs.     Records Reviewed: Nursing Notes    Provider Notes (Medical Decision Making):   LINNEA sprain strain fracture          Disposition:  home    DISCHARGE NOTE:           Care plan outlined and precautions discussed. Patient has no new complaints, changes, or physical findings. Results of xray were reviewed with the patient. All medications were reviewed with the patient; will d/c home with tylenol arthritis voltaren. All of pt's questions and concerns were addressed. Patient was instructed and agrees to follow up with PCP, as well as to return to the ED upon further deterioration. Patient is ready to go home. Follow-up Information       Follow up With Specialties Details Why Contact Info    Nicolasa Garcia, [address]  [phone]              Discharge Medication List as of 7/29/2022  2:13 PM        START taking these medications    Details   acetaminophen (Tylenol Arthritis Pain) 650 mg TbER Take 1 Tablet by mouth every eight (8) hours. , Normal, Disp-20 Tablet, R-0      diclofenac EC (VOLTAREN) 75 mg EC tablet Take 1 Tablet by mouth two (2) times a day., Normal, Disp-30 Tablet, R-0           CONTINUE these medications which have NOT CHANGED    Details   predniSONE (STERAPRED) 5 mg dose pack See administration instruction per 5mg dose pack, Normal, Disp-21 Tablet, R-0      lidocaine (LIDODERM) 5 % Apply patch to the affected area for 12 hours a day and remove for 12 hours a day., Normal, Disp-10 Each, R-0      ferrous sulfate (IRON) 325 mg (65 mg iron) EC tablet Take 1 Tablet by mouth three (3) times daily (with meals). , Normal, Disp-90 Tablet, R-3      hydroCHLOROthiazide (HYDRODIURIL) 25 mg tablet Take 1 Tablet by mouth daily. , Normal, Disp-90 Tablet, R-0      venlafaxine (EFFEXOR) 75 mg tablet Take 1 Tablet by mouth daily. , Normal, Disp-90 Tablet, R-3      simvastatin (ZOCOR) 10 mg tablet Take 1 Tablet by mouth nightly., Normal, Disp-90 Tablet, R-3      aspirin 81 mg chewable tablet Take 1 Tab by mouth daily. , No Print, Disp-30 Tab, R-11 Procedures:  Procedures    Please note that this dictation was completed with Dragon, computer voice recognition software. Quite often unanticipated grammatical, syntax, homophones, and other interpretive errors are inadvertently transcribed by the computer software. Please disregard these errors. Additionally, please excuse any errors that have escaped final proofreading. Diagnosis     Clinical Impression:   1.  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

## 2022-08-02 ENCOUNTER — OFFICE VISIT (OUTPATIENT)
Dept: FAMILY MEDICINE CLINIC | Age: 72
End: 2022-08-02
Payer: MEDICARE

## 2022-08-02 VITALS
RESPIRATION RATE: 17 BRPM | TEMPERATURE: 97.9 F | BODY MASS INDEX: 27.11 KG/M2 | OXYGEN SATURATION: 97 % | DIASTOLIC BLOOD PRESSURE: 69 MMHG | HEART RATE: 82 BPM | WEIGHT: 153 LBS | HEIGHT: 63 IN | SYSTOLIC BLOOD PRESSURE: 112 MMHG

## 2022-08-02 DIAGNOSIS — Z71.89 ADVICE GIVEN ABOUT COVID-19 VIRUS INFECTION: ICD-10-CM

## 2022-08-02 DIAGNOSIS — G89.29 CHRONIC PAIN OF MULTIPLE JOINTS: ICD-10-CM

## 2022-08-02 DIAGNOSIS — M25.50 CHRONIC PAIN OF MULTIPLE JOINTS: ICD-10-CM

## 2022-08-02 DIAGNOSIS — G89.4 CHRONIC PAIN SYNDROME: Primary | ICD-10-CM

## 2022-08-02 PROCEDURE — 1123F ACP DISCUSS/DSCN MKR DOCD: CPT | Performed by: FAMILY MEDICINE

## 2022-08-02 PROCEDURE — 3017F COLORECTAL CA SCREEN DOC REV: CPT | Performed by: FAMILY MEDICINE

## 2022-08-02 PROCEDURE — 1090F PRES/ABSN URINE INCON ASSESS: CPT | Performed by: FAMILY MEDICINE

## 2022-08-02 PROCEDURE — G9899 SCRN MAM PERF RSLTS DOC: HCPCS | Performed by: FAMILY MEDICINE

## 2022-08-02 PROCEDURE — G8510 SCR DEP NEG, NO PLAN REQD: HCPCS | Performed by: FAMILY MEDICINE

## 2022-08-02 PROCEDURE — G8752 SYS BP LESS 140: HCPCS | Performed by: FAMILY MEDICINE

## 2022-08-02 PROCEDURE — G8754 DIAS BP LESS 90: HCPCS | Performed by: FAMILY MEDICINE

## 2022-08-02 PROCEDURE — G8427 DOCREV CUR MEDS BY ELIG CLIN: HCPCS | Performed by: FAMILY MEDICINE

## 2022-08-02 PROCEDURE — G8399 PT W/DXA RESULTS DOCUMENT: HCPCS | Performed by: FAMILY MEDICINE

## 2022-08-02 PROCEDURE — G8417 CALC BMI ABV UP PARAM F/U: HCPCS | Performed by: FAMILY MEDICINE

## 2022-08-02 PROCEDURE — 1101F PT FALLS ASSESS-DOCD LE1/YR: CPT | Performed by: FAMILY MEDICINE

## 2022-08-02 PROCEDURE — 99214 OFFICE O/P EST MOD 30 MIN: CPT | Performed by: FAMILY MEDICINE

## 2022-08-02 PROCEDURE — G8536 NO DOC ELDER MAL SCRN: HCPCS | Performed by: FAMILY MEDICINE

## 2022-08-02 RX ORDER — TRAMADOL HYDROCHLORIDE 50 MG/1
50 TABLET ORAL
Qty: 42 TABLET | Refills: 0 | Status: SHIPPED | OUTPATIENT
Start: 2022-08-02 | End: 2022-08-16

## 2022-08-02 NOTE — PROGRESS NOTES
Chief Complaint   Patient presents with    Joint Pain     Friday morning she could walk on her right foot, She went to [facility] they did an xray, she got osteoarthritis        1. \"Have you been to the ER, urgent care clinic since your last visit? Hospitalized since your last visit? \" Yes When: July 29,2022 Where: [facility] Reason for visit: Could not walk     2. \"Have you seen or consulted any other health care providers outside of the [address] [place] [address] since your last visit? \" No     3. For patients aged 39-70: Has the patient had a colonoscopy / FIT/ Cologuard? Yes - no Care Gap present      If the patient is female:    4. For patients aged 41-77: Has the patient had a mammogram within the past 2 years? No  Next week 08/2022      5. For patients aged 21-65: Has the patient had a pap smear?  NA - based on age or sex    Health Maintenance Due   Topic Date Due    COVID-19 Vaccine (1) Never done    DTaP/Tdap/Td series (1 - Tdap) Never done    Shingrix Vaccine Age 50> (1 of 2) Never done    A1C test (Diabetic or Prediabetic)  04/02/2019

## 2022-08-02 NOTE — PROGRESS NOTES
HISTORY OF PRESENT ILLNESS  Jude Carrasquillo is a 67 y.o. female, present with multiple joint pain stating that went to ER,  pt first, [facility], each has been given steroid and NSAID's, stating none has been helping the condition, complaint of low back pain shoulder pain neck pain bilateral knee pain pain is 8 out of 10 dull radiating all over patient states that she was told to have history of fibromyalgia current medication not controlling the pain, never abused any prescription medication does not drink any alcoholic beverages no other illicit drug use,   Has done multiple x-ray including x-ray of the right foot x-ray of the spine x-ray of the rest x-ray of the knee, patient also has been sent to orthopedic surgeon physical therapy for better outcome stating that she also received multiple injection therapy none has been helping the condition  IMPRESSION  1. No fracture. 2. Mild great toe osteoarthritis. Current Outpatient Medications   Medication Sig Dispense Refill    traMADoL (ULTRAM) 50 mg tablet Take 1 Tablet by mouth every eight (8) hours as needed for Pain for up to 14 days. Max Daily Amount: 150 mg. Indications: pain 42 Tablet 0    acetaminophen (Tylenol Arthritis Pain) 650 mg TbER Take 1 Tablet by mouth every eight (8) hours. 20 Tablet 0    diclofenac EC (VOLTAREN) 75 mg EC tablet Take 1 Tablet by mouth two (2) times a day. 30 Tablet 0    lidocaine (LIDODERM) 5 % Apply patch to the affected area for 12 hours a day and remove for 12 hours a day. 10 Each 0    ferrous sulfate (IRON) 325 mg (65 mg iron) EC tablet Take 1 Tablet by mouth three (3) times daily (with meals). 90 Tablet 3    hydroCHLOROthiazide (HYDRODIURIL) 25 mg tablet Take 1 Tablet by mouth daily. 90 Tablet 0    venlafaxine (EFFEXOR) 75 mg tablet Take 1 Tablet by mouth daily. 90 Tablet 3    simvastatin (ZOCOR) 10 mg tablet Take 1 Tablet by mouth nightly.  90 Tablet 3    aspirin 81 mg chewable tablet Take 1 Tab by mouth daily. 30 Tab 11    predniSONE (STERAPRED) 5 mg dose pack See administration instruction per 5mg dose pack (Patient not taking: Reported on 8/2/2022) 21 Tablet 0     No Known Allergies  Past Medical History:   Diagnosis Date    Arthritis     Contact dermatitis and other eczema, due to unspecified cause     Hot flashes, menopausal 04/21/2010    HTN (hypertension) 03/29/2010    Insomnia 03/29/2010    Knee pain, right 06/04/2015    Osteopenia 04/21/2010    Prediabetes 06/08/2012    Recurrent sinusitis 03/07/2016    Screening for osteoporosis 03/07/2016     Past Surgical History:   Procedure Laterality Date    COLONOSCOPY N/A 1/24/2020    COLONOSCOPY performed by Stella Maier MD at [facility] ENDOSCOPY    COLONOSCOPY N/A 1/21/2021    COLONOSCOPY performed by Stella Maier MD at [facility] ENDOSCOPY    ENDOSCOPY, COLON, DIAGNOSTIC      in 2009    HX BREAST BIOPSY Left 1970    benign:  no visible scar    HX HYSTERECTOMY      42's     Family History   Problem Relation Age of Onset    Stroke Mother     Hypertension Sister     Hypertension Brother     Hypertension Sister     Hypertension Brother      Social History     Tobacco Use    Smoking status: Former    Smokeless tobacco: Never    Tobacco comments:     quit 30 yrs ago   Substance Use Topics    Alcohol use: Yes     Comment: 2 beer per week      Lab Results   Component Value Date/Time    Hemoglobin A1c 5.9 (H) 09/27/2017 12:44 PM    Hemoglobin A1c 6.5 (H) 06/17/2014 11:25 AM    Hemoglobin A1c 5.7 (H) 07/03/2013 08:26 AM    Glucose 91 01/04/2022 02:33 PM    LDL, calculated 80.8 01/04/2022 02:33 PM    Creatinine 0.86 01/04/2022 02:33 PM      Lab Results   Component Value Date/Time    Cholesterol, total 148 01/04/2022 02:33 PM    HDL Cholesterol 53 01/04/2022 02:33 PM    LDL, calculated 80.8 01/04/2022 02:33 PM    Triglyceride 71 01/04/2022 02:33 PM    CHOL/HDL Ratio 2.8 01/04/2022 02:33 PM        Review of Systems   Constitutional:  Negative for chills and fever. HENT:  Negative for congestion, ear pain and nosebleeds. Eyes:  Negative for blurred vision, pain and discharge. Respiratory:  Negative for cough, shortness of breath and wheezing. Cardiovascular:  Negative for chest pain and leg swelling. Gastrointestinal:  Negative for constipation, diarrhea, nausea and vomiting. Genitourinary:  Negative for dysuria and frequency. Musculoskeletal:  Positive for back pain. Negative for joint pain and myalgias. Skin:  Negative for itching and rash. Neurological:  Positive for weakness. Negative for dizziness, loss of consciousness and headaches. Psychiatric/Behavioral:  Negative for depression. The patient is not nervous/anxious and does not have insomnia. Physical Exam  Vitals and nursing note reviewed. Constitutional:       Appearance: She is well-developed. HENT:      Head: Normocephalic and atraumatic. Eyes:      Conjunctiva/sclera: Conjunctivae normal.      Pupils: Pupils are equal, round, and reactive to light. Neck:      Thyroid: No thyromegaly. Vascular: No JVD. Cardiovascular:      Rate and Rhythm: Normal rate and regular rhythm. Heart sounds: Normal heart sounds. No murmur heard. No friction rub. No gallop. Pulmonary:      Effort: Pulmonary effort is normal. No respiratory distress. Breath sounds: Normal breath sounds. No stridor. No wheezing or rales. Abdominal:      General: Bowel sounds are normal. There is no distension. Palpations: Abdomen is soft. There is no mass. Tenderness: There is no abdominal tenderness. Musculoskeletal:         General: No tenderness. Normal range of motion. Lymphadenopathy:      Cervical: No cervical adenopathy. Skin:     Findings: No erythema or rash. Neurological:      Mental Status: She is alert and oriented to person, place, and time. Cranial Nerves: No cranial nerve deficit. Deep Tendon Reflexes: Reflexes are normal and symmetric.    Psychiatric: Behavior: Behavior normal.       ASSESSMENT and PLAN    ICD-10-CM ICD-9-CM    1. Chronic pain syndrome  G89.4 338.4 REFERRAL TO PHYSICAL THERAPY      traMADoL (ULTRAM) 50 mg tablet      REFERRAL TO ORTHOPEDICS      2. Chronic pain of multiple joints  M25.50 719.49 REFERRAL TO PHYSICAL THERAPY    G89.29 338.29 traMADoL (ULTRAM) 50 mg tablet      REFERRAL TO ORTHOPEDICS      3.  Advice given about COVID-19 virus infection  Z71.89 V65.49             With risk-benefit analysis, cs med was prescribed and was told to be considering available nonpharmacologic,   Patient was told that the medication is not safe was told not to operate any machinery while taking the medication meanwhile emphasized that the pt should take the medication as needed not on a regular basis avoid alcohol intake and avoid other medication that could potentiate its effect, regardless patient was told any other medication given by any other doctor the pt need to call primary care for further advice`     lab results and schedule of future lab studies reviewed with patient  reviewed diet, exercise and weight control  reviewed medications and side effects in detail  D/c statin fro next wks,       patient acknowledged understanding and agreed with today's recommendation

## 2022-08-05 ENCOUNTER — TELEPHONE (OUTPATIENT)
Dept: FAMILY MEDICINE CLINIC | Age: 72
End: 2022-08-05

## 2022-08-05 NOTE — TELEPHONE ENCOUNTER
----- Message from Alexa Beverley sent at 8/5/2022  3:17 PM EDT -----  Subject: Message to Provider    QUESTIONS  Information for Provider? PT. states Dr. Keya Epps referred her to physical   therapy. PT. states they told her that she couldn't begin physical therapy   until Dr. Keya Epps fax over the paper work on what the pt. needs to have   done in physical therapy. The fax number is [phone].  ---------------------------------------------------------------------------  --------------  [id number] [employer]  [phone]; OK to leave message on voicemail  ---------------------------------------------------------------------------  --------------  SCRIPT ANSWERS  Relationship to Patient?  Self

## 2022-08-09 ENCOUNTER — HOSPITAL ENCOUNTER (OUTPATIENT)
Dept: MAMMOGRAPHY | Age: 72
Discharge: HOME | End: 2022-08-09
Attending: FAMILY MEDICINE
Payer: MEDICARE

## 2022-08-09 DIAGNOSIS — Z12.31 SCREENING MAMMOGRAM FOR HIGH-RISK PATIENT: ICD-10-CM

## 2022-08-09 PROCEDURE — 77067 SCR MAMMO BI INCL CAD: CPT

## 2022-08-31 ENCOUNTER — TELEPHONE (OUTPATIENT)
Dept: FAMILY MEDICINE CLINIC | Age: 72
End: 2022-08-31

## 2022-08-31 NOTE — TELEPHONE ENCOUNTER
----- Message from Stormy Gil sent at 8/30/2022  1:38 PM EDT -----  Subject: Message to Provider    QUESTIONS  Information for Provider? Patient is requesting a prescription for vitamin   D3 pills because she has been having problems with her bones. Patient   would like a call back as soon as possible.  ---------------------------------------------------------------------------  --------------  [employer] [employer]  [phone]; OK to leave message on voicemail  ---------------------------------------------------------------------------  --------------  SCRIPT ANSWERS  Relationship to Patient?  Self

## 2022-09-06 DIAGNOSIS — M25.561 RIGHT KNEE PAIN, UNSPECIFIED CHRONICITY: Primary | ICD-10-CM

## 2022-09-07 ENCOUNTER — OFFICE VISIT (OUTPATIENT)
Dept: ORTHOPEDIC SURGERY | Age: 72
End: 2022-09-07
Payer: MEDICARE

## 2022-09-07 ENCOUNTER — HOSPITAL ENCOUNTER (OUTPATIENT)
Dept: GENERAL RADIOLOGY | Age: 72
Discharge: HOME | End: 2022-09-07
Payer: MEDICARE

## 2022-09-07 VITALS
RESPIRATION RATE: 18 BRPM | SYSTOLIC BLOOD PRESSURE: 133 MMHG | HEIGHT: 63 IN | BODY MASS INDEX: 26.4 KG/M2 | HEART RATE: 79 BPM | OXYGEN SATURATION: 96 % | TEMPERATURE: 97.4 F | DIASTOLIC BLOOD PRESSURE: 72 MMHG | WEIGHT: 149 LBS

## 2022-09-07 DIAGNOSIS — M17.11 UNILATERAL PRIMARY OSTEOARTHRITIS, RIGHT KNEE: Primary | ICD-10-CM

## 2022-09-07 DIAGNOSIS — M25.561 RIGHT KNEE PAIN, UNSPECIFIED CHRONICITY: ICD-10-CM

## 2022-09-07 PROCEDURE — G8752 SYS BP LESS 140: HCPCS | Performed by: ORTHOPAEDIC SURGERY

## 2022-09-07 PROCEDURE — G8754 DIAS BP LESS 90: HCPCS | Performed by: ORTHOPAEDIC SURGERY

## 2022-09-07 PROCEDURE — G8427 DOCREV CUR MEDS BY ELIG CLIN: HCPCS | Performed by: ORTHOPAEDIC SURGERY

## 2022-09-07 PROCEDURE — G8399 PT W/DXA RESULTS DOCUMENT: HCPCS | Performed by: ORTHOPAEDIC SURGERY

## 2022-09-07 PROCEDURE — G8510 SCR DEP NEG, NO PLAN REQD: HCPCS | Performed by: ORTHOPAEDIC SURGERY

## 2022-09-07 PROCEDURE — G9899 SCRN MAM PERF RSLTS DOC: HCPCS | Performed by: ORTHOPAEDIC SURGERY

## 2022-09-07 PROCEDURE — 3017F COLORECTAL CA SCREEN DOC REV: CPT | Performed by: ORTHOPAEDIC SURGERY

## 2022-09-07 PROCEDURE — 73564 X-RAY EXAM KNEE 4 OR MORE: CPT

## 2022-09-07 PROCEDURE — 99203 OFFICE O/P NEW LOW 30 MIN: CPT | Performed by: ORTHOPAEDIC SURGERY

## 2022-09-07 PROCEDURE — 1101F PT FALLS ASSESS-DOCD LE1/YR: CPT | Performed by: ORTHOPAEDIC SURGERY

## 2022-09-07 PROCEDURE — G8536 NO DOC ELDER MAL SCRN: HCPCS | Performed by: ORTHOPAEDIC SURGERY

## 2022-09-07 PROCEDURE — 1090F PRES/ABSN URINE INCON ASSESS: CPT | Performed by: ORTHOPAEDIC SURGERY

## 2022-09-07 PROCEDURE — 1123F ACP DISCUSS/DSCN MKR DOCD: CPT | Performed by: ORTHOPAEDIC SURGERY

## 2022-09-07 PROCEDURE — G8417 CALC BMI ABV UP PARAM F/U: HCPCS | Performed by: ORTHOPAEDIC SURGERY

## 2022-09-07 RX ORDER — CELECOXIB 200 MG/1
200 CAPSULE ORAL 2 TIMES DAILY
Qty: 180 CAPSULE | Refills: 0 | Status: SHIPPED | OUTPATIENT
Start: 2022-09-07

## 2022-09-07 NOTE — LETTER
9/7/2022    Patient: Jennifer Rivers   YOB: 1950   Date of Visit: 9/7/2022     Jesusita Black MD  [address] [place] [address]    Dear Jesusita Black MD,      Thank you for referring Ms. Madonna Carrillo to [facility] for evaluation. My notes for this consultation are attached. If you have questions, please do not hesitate to call me. I look forward to following your patient along with you.       Sincerely,    Sabrina Pereira, DO

## 2022-09-07 NOTE — PROGRESS NOTES
9/7/2022    Chief Complaint: Right knee pain    Assessment: Osteoarthritis right knee    Plan: This patient and I did discuss the many options in treating knee osteoarthritis. We did discuss that we could continue to seek out nonoperative modalities, such as: NSAIDs, oral and topical analgesics, knee injections, knee braces, physical therapy, stretching, strengthening, and weight loss strategies, activity modification, ambulatory assistive devices. The patient stated their understanding with this, but and would like to proceed with nonsurgical management in the form of celebrex. HPI: This is a 67 y.o. female who complains of right knee pain. Onset was gradual.  The patient has had activity dependent pain for one month. The patient has tried activity modification, physical therapy exercises, injections have not been attempted. The pain is in the medial knee, it is moderate in intensity. The patient feels unstable with the knee, fears falling, and has significant limitation with activities of daily living, recreation, and walks with a limp.     Past Medical History:   Diagnosis Date    Arthritis     Contact dermatitis and other eczema, due to unspecified cause     Hot flashes, menopausal 04/21/2010    HTN (hypertension) 03/29/2010    Insomnia 03/29/2010    Knee pain, right 06/04/2015    Osteopenia 04/21/2010    Prediabetes 06/08/2012    Recurrent sinusitis 03/07/2016    Screening for osteoporosis 03/07/2016       Past Surgical History:   Procedure Laterality Date    COLONOSCOPY N/A 1/24/2020    COLONOSCOPY performed by Charlotte Lizama MD at [facility] ENDOSCOPY    COLONOSCOPY N/A 1/21/2021    COLONOSCOPY performed by Charlotte Lizama MD at [facility] ENDOSCOPY    ENDOSCOPY, COLON, DIAGNOSTIC      in 2009    HX BREAST BIOPSY Left 1970    benign:  no visible scar    HX HYSTERECTOMY      40's       Current Outpatient Medications on File Prior to Visit   Medication Sig Dispense Refill    acetaminophen (Tylenol Arthritis Pain) 650 mg TbER Take 1 Tablet by mouth every eight (8) hours. 20 Tablet 0    ferrous sulfate (IRON) 325 mg (65 mg iron) EC tablet Take 1 Tablet by mouth three (3) times daily (with meals). 90 Tablet 3    hydroCHLOROthiazide (HYDRODIURIL) 25 mg tablet Take 1 Tablet by mouth daily. 90 Tablet 0    venlafaxine (EFFEXOR) 75 mg tablet Take 1 Tablet by mouth daily. 90 Tablet 3    simvastatin (ZOCOR) 10 mg tablet Take 1 Tablet by mouth nightly. 90 Tablet 3    aspirin 81 mg chewable tablet Take 1 Tab by mouth daily. 30 Tab 11    predniSONE (STERAPRED) 5 mg dose pack See administration instruction per 5mg dose pack (Patient not taking: No sig reported) 21 Tablet 0    lidocaine (LIDODERM) 5 % Apply patch to the affected area for 12 hours a day and remove for 12 hours a day. (Patient not taking: Reported on 9/7/2022) 10 Each 0     No current facility-administered medications on file prior to visit.        No Known Allergies    Family History   Problem Relation Age of Onset    Stroke Mother     Hypertension Sister     Hypertension Brother     Hypertension Sister     Hypertension Brother        Social History     Socioeconomic History    Marital status:    Tobacco Use    Smoking status: Former    Smokeless tobacco: Never    Tobacco comments:     quit 30 yrs ago   Vaping Use    Vaping Use: Never used   Substance and Sexual Activity    Alcohol use: Yes     Comment: 2 beer per week    Drug use: No    Sexual activity: Yes     Partners: Male         Review of Systems:       General: Denies headache, lethargy, fever, weight loss  Ears/Nose/Throat: Denies ear discharge, drainage, nosebleeds, hoarse voice, dental problems  Cardiovascular: Denies chest pain, shortness of breath  Lungs: Denies chest pain, breathing problems, wheezing, pneumonia  Stomach: Denies stomach pain, heartburn, constipation, irritable bowel  Skin: Denies rash, sores, open wounds  Musculoskeletal: Admits to knee pain  Genitourinary: Denies dysuria, hematuria, polyuria  Gastrointestinal: Denies constipation, obstipation, diarrhea  Neurological: Denies changes in sight, smell, hearing, taste, seizures. Denies loss of consciousness. Psychiatric: Denies depression, sleep pattern changes, anxiety, change in personality  Endocrine: Denies mood swings, heat or cold intolerance  Hematologic/Lymphatic: Denies anemia, purpura, petechia  Allergic/Immunologic: Denies swelling of throat, pain or swelling at lymph nodes      Physical Examination:    Visit Vitals  /72   Pulse 79   Temp 97.4 °F (36.3 °C) (Temporal)   Resp 18   Ht 5' 3\" (1.6 m)   Wt 149 lb (67.6 kg)   SpO2 96%   BMI 26.39 kg/m²        General: AOX3, no apparent distress  Psychiatric: mood and affect appropriate  Lungs: breathing is symmetric and unlabored bilaterally  Heart: regular rate and rhythm  Abdomen: no guarding  Head: normocephalic, atraumatic  Skin: No significant abnormalities, good turgor  Sensation intact to light touch: L1-S1 dermatomes  Muscular exam: 5/5 strength in all major muscle groups unless noted in specialty exam.    Extremities:      Left upper extremity: Full active and passive range of motion without pain, deformity, no open wound, strength 5/5 in all major muscle groups. Right upper extremity: Full active and passive range of motion without pain, deformity, no open wound, strength 5/5 in all major muscle groups. Left lower extremity: Full active and passive range of motion without pain, deformity, no open wound, strength 5/5 in all major muscle groups. Right lower extremity:  No deformity is noted. Range of motion of the knee is 0-120. Ligamentous testing of the knee indicates stability of the the MCL, LCL, PCL, and ACL. Lachman's, anterior and posterior drawer tests are specifically negative. Medial joint line tenderness to palpation is noted. Popliteal area is unremarkable. 1+  for effusion. + patellar crepitus. Patella tracks centrally.   Pivot shift is negative. Strength testing is indicative of 5/5 strength at hip flexion, extension, knee flexion and extension, tibialis anterior, EHL, and FHL. Sensation is intact to light touch in the L1-S1 dermatomes. Capillary refill is less than 2 seconds in the toes. Diagnostics:    Pertinent Diagnostics:  Xrays are available of the right knee, they indicate mild osteoarthritis of the knee joint, no significant other findings, no other osseus abnormalities, fractures, or dislocations. Procedures: none today    Ms. Ysabel Ricks has a reminder for a \"due or due soon\" health maintenance. I have asked that she contact her primary care provider for follow-up on this health maintenance.

## 2022-09-07 NOTE — PROGRESS NOTES
Saima Ramirez is a 67 y.o. female  HIPAA verified by two patient identifiers. Chief Complaint   Patient presents with    Knee Pain     9/10     Visit Vitals  /72   Pulse 79   Temp 97.4 °F (36.3 °C) (Temporal)   Resp 18   Ht 5' 3\" (1.6 m)   Wt 149 lb (67.6 kg)   SpO2 96%   BMI 26.39 kg/m²       Pain Scale: 9/10  Pain Location: Hip  1. Have you been to the ER, urgent care clinic since your last visit? Hospitalized since your last visit? No    2. Have you seen or consulted any other health care providers outside of the [address] [place] [address] since your last visit? Include any pap smears or colon screening.  No

## 2022-09-22 DIAGNOSIS — R73.03 PREDIABETES: ICD-10-CM

## 2022-09-22 DIAGNOSIS — I10 ESSENTIAL HYPERTENSION: ICD-10-CM

## 2022-09-22 DIAGNOSIS — E78.00 HYPERCHOLESTEROLEMIA: ICD-10-CM

## 2022-09-22 DIAGNOSIS — N95.1 HOT FLASHES, MENOPAUSAL: ICD-10-CM

## 2022-09-22 RX ORDER — HYDROCHLOROTHIAZIDE 25 MG/1
25 TABLET ORAL DAILY
Qty: 90 TABLET | Refills: 0 | Status: SHIPPED | OUTPATIENT
Start: 2022-09-22

## 2022-09-22 NOTE — TELEPHONE ENCOUNTER
Last visit:8/2/22  Next visit:not scheduled  Previous refill 6/2/22    Requested Prescriptions     Pending Prescriptions Disp Refills    hydroCHLOROthiazide (HYDRODIURIL) 25 mg tablet 90 Tablet 0     Sig: Take 1 Tablet by mouth daily. For [address] in place:   Recommendation Provided To:    Intervention Detail: New Rx: 1, reason: Patient Preference  Gap Closed?:   Intervention Accepted By:   Time Spent (min): 5

## 2022-11-01 ENCOUNTER — OFFICE VISIT (OUTPATIENT)
Dept: ORTHOPEDIC SURGERY | Age: 72
End: 2022-11-01
Payer: MEDICARE

## 2022-11-01 VITALS
OXYGEN SATURATION: 98 % | SYSTOLIC BLOOD PRESSURE: 163 MMHG | HEIGHT: 63 IN | DIASTOLIC BLOOD PRESSURE: 82 MMHG | BODY MASS INDEX: 26.75 KG/M2 | HEART RATE: 76 BPM | TEMPERATURE: 96.9 F | WEIGHT: 151 LBS

## 2022-11-01 DIAGNOSIS — M17.11 UNILATERAL PRIMARY OSTEOARTHRITIS, RIGHT KNEE: Primary | ICD-10-CM

## 2022-11-01 PROCEDURE — 20610 DRAIN/INJ JOINT/BURSA W/O US: CPT | Performed by: ORTHOPAEDIC SURGERY

## 2022-11-01 RX ORDER — BETAMETHASONE SODIUM PHOSPHATE AND BETAMETHASONE ACETATE 3; 3 MG/ML; MG/ML
6 INJECTION, SUSPENSION INTRA-ARTICULAR; INTRALESIONAL; INTRAMUSCULAR; SOFT TISSUE ONCE
Status: COMPLETED | OUTPATIENT
Start: 2022-11-01 | End: 2022-11-01

## 2022-11-01 RX ADMIN — BETAMETHASONE SODIUM PHOSPHATE AND BETAMETHASONE ACETATE 6 MG: 3; 3 INJECTION, SUSPENSION INTRA-ARTICULAR; INTRALESIONAL; INTRAMUSCULAR; SOFT TISSUE at 12:42

## 2022-11-01 NOTE — PROGRESS NOTES
Identified pt with two pt identifiers (name and ). Reviewed chart in preparation for visit and have obtained necessary documentation. Noemi Camargo is a 67 y.o. female  Chief Complaint   Patient presents with    Knee Pain     Follow up RT Knee      Visit Vitals  BP (!) 163/82 (BP 1 Location: Right arm, BP Patient Position: Sitting, BP Cuff Size: Adult) Comment: Pt stated she just drank coffee   Pulse 76   Temp 96.9 °F (36.1 °C) (Tympanic)   Ht 5' 3\" (1.6 m)   Wt 151 lb (68.5 kg)   SpO2 98%   BMI 26.75 kg/m²     1. Have you been to the ER, urgent care clinic since your last visit? Hospitalized since your last visit? No    2. Have you seen or consulted any other health care providers outside of the [address] [place] [address] since your last visit? Include any pap smears or colon screening.  No

## 2022-11-01 NOTE — LETTER
11/1/2022    Patient: Jeannie Mathis   YOB: 1950   Date of Visit: 11/1/2022     Kg Gregory MD  [address] [facility] [address]  [place]    Dear Kg Gregory MD,      Thank you for referring Ms. Sylvester Carter to [facility] for evaluation. My notes for this consultation are attached. If you have questions, please do not hesitate to call me. I look forward to following your patient along with you.       Sincerely,    Ida Bravo, DO

## 2022-11-01 NOTE — PROGRESS NOTES
11/1/2022      CC: right knee pain    HPI:      This is a 67y.o. year old female who presents for a follow up visit. The patient was last seen and diagnosed with right knee osteoarthritis. The patient's treatments since the most recent visit have comprised of celebrex. The patient has had some relief of the chief complaint. PMH:  Past Medical History:   Diagnosis Date    Arthritis     Contact dermatitis and other eczema, due to unspecified cause     Hot flashes, menopausal 04/21/2010    HTN (hypertension) 03/29/2010    Insomnia 03/29/2010    Knee pain, right 06/04/2015    Osteopenia 04/21/2010    Prediabetes 06/08/2012    Recurrent sinusitis 03/07/2016    Screening for osteoporosis 03/07/2016       PSxHx:  Past Surgical History:   Procedure Laterality Date    COLONOSCOPY N/A 1/24/2020    COLONOSCOPY performed by Claudia Tracey MD at [facility] ENDOSCOPY    COLONOSCOPY N/A 1/21/2021    COLONOSCOPY performed by Claudia Tracey MD at [facility] ENDOSCOPY    ENDOSCOPY, COLON, DIAGNOSTIC      in 2009    HX BREAST BIOPSY Left 1970    benign:  no visible scar    HX HYSTERECTOMY      40's       Meds:    Current Outpatient Medications:     hydroCHLOROthiazide (HYDRODIURIL) 25 mg tablet, Take 1 Tablet by mouth daily. , Disp: 90 Tablet, Rfl: 0    celecoxib (CELEBREX) 200 mg capsule, Take 1 Capsule by mouth two (2) times a day., Disp: 180 Capsule, Rfl: 0    acetaminophen (Tylenol Arthritis Pain) 650 mg TbER, Take 1 Tablet by mouth every eight (8) hours. , Disp: 20 Tablet, Rfl: 0    ferrous sulfate (IRON) 325 mg (65 mg iron) EC tablet, Take 1 Tablet by mouth three (3) times daily (with meals). , Disp: 90 Tablet, Rfl: 3    venlafaxine (EFFEXOR) 75 mg tablet, Take 1 Tablet by mouth daily. , Disp: 90 Tablet, Rfl: 3    simvastatin (ZOCOR) 10 mg tablet, Take 1 Tablet by mouth nightly., Disp: 90 Tablet, Rfl: 3    aspirin 81 mg chewable tablet, Take 1 Tab by mouth daily. , Disp: 30 Tab, Rfl: 11    predniSONE (STERAPRED) 5 mg dose pack, See administration instruction per 5mg dose pack (Patient not taking: No sig reported), Disp: 21 Tablet, Rfl: 0    lidocaine (LIDODERM) 5 %, Apply patch to the affected area for 12 hours a day and remove for 12 hours a day. (Patient not taking: No sig reported), Disp: 10 Each, Rfl: 0    Current Facility-Administered Medications:     betamethasone (CELESTONE) injection 6 mg, 6 mg, Intra artICUlar, ONCE, Budny, Rima Minors, DO    All:  No Known Allergies    Social Hx:  Social History     Socioeconomic History    Marital status:    Tobacco Use    Smoking status: Former    Smokeless tobacco: Never    Tobacco comments:     quit 30 yrs ago   Vaping Use    Vaping Use: Never used   Substance and Sexual Activity    Alcohol use: Yes     Comment: 2 beer per week    Drug use: No    Sexual activity: Yes     Partners: Male       Family Hx:  Family History   Problem Relation Age of Onset    Stroke Mother     Hypertension Sister     Hypertension Brother     Hypertension Sister     Hypertension Brother          Review of Systems:       General: Denies headache, lethargy, fever, weight loss  Ears/Nose/Throat: Denies ear discharge, drainage, nosebleeds, hoarse voice, dental problems  Cardiovascular: Denies chest pain, shortness of breath  Lungs: Denies chest pain, breathing problems, wheezing, pneumonia  Stomach: Denies stomach pain, heartburn, constipation, irritable bowel  Skin: Denies rash, sores, open wounds  Musculoskeletal: right knee pain  Genitourinary: Denies dysuria, hematuria, polyuria  Gastrointestinal: Denies constipation, obstipation, diarrhea  Neurological: Denies changes in sight, smell, hearing, taste, seizures. Denies loss of consciousness.   Psychiatric: Denies depression, sleep pattern changes, anxiety, change in personality  Endocrine: Denies mood swings, heat or cold intolerance  Hematologic/Lymphatic: Denies anemia, purpura, petechia  Allergic/Immunologic: Denies swelling of throat, pain or swelling at lymph nodes      Physical Examination:    Visit Vitals  BP (!) 163/82 (BP 1 Location: Right arm, BP Patient Position: Sitting, BP Cuff Size: Adult)   Pulse 76   Temp 96.9 °F (36.1 °C) (Tympanic)   Ht 5' 3\" (1.6 m)   Wt 151 lb (68.5 kg)   SpO2 98%   BMI 26.75 kg/m²        General: AOX3, no apparent distress  Psychiatric: mood and affect appropriate  Lungs: breathing is symmetric and unlabored bilaterally  Heart: regular rate and rhythm  Abdomen: no guarding  Head: normocephalic, atraumatic  Skin: No significant abnormalities, good turgor  Sensation intact to light touch: C5-T1 dermatomes  Muscular exam: 5/5 strength in all major muscle groups unless noted in specialty exam.    Extremities        Left lower extremity:  No gross deformity. No restriction to range of motion of the hip, knee, ankle. Muscle bulk is appropriate without wasting. Sensation is intact to light touch in the L1-S1 dermatomes. Capillary refill is less than 2 seconds in the fingers. Strength testing is 5/5 at the major muscle groups of the hip, knee, ankle. Right lower extremity: Knee is noted to have a mild effusion. Medial joint line tenderness to palpation with a negative deformity. Patellar crepitus with range of motion is noted. Range of motion is 0-120. Sensation is intact to light touch L1-S1 dermatomes. Knee flexion and extension strength is 5/5 with Tibialis anterior, EHL, FHL being 5/5 as well. No other gross deformity or deficit is noted. Diagnostics:    Pertinent Diagnostics: none today    Assessment: Right knee Osteoarthritis  Plan: This patient I had a long discussion regarding treatment options. We went over the nonoperative options, continued medications, injections of multiple types, bracing, physical therapy, maintenance of ideal body weight. Patient has elected to proceed with injection. Ms. Ramon Wright has a reminder for a \"due or due soon\" health maintenance.  I have asked that she contact her primary care provider for follow-up on this health maintenance. Date of Procedure: 11/1/2022  PROCEDURE NOTE: Right knee injection of Celestone    Consent was obtained from the patient. The correct site was identified after confirmation with the patient. Following identification and confirmation of the correct site with the patient, the superolateral right knee was prepped in the normal sterile fashion. A local anesthetic of 1% lidocaine without epinephrine was then administered to the local tissues. Following, an injection of a mixture of  6 mg Celestone and 1% lidocaine without epinephrine was administered to the right knee. The needle was then withdrawn and the injection site dressed with a sterile bandage at the conclusion. The procedure was well tolerated by the patient. No immediate adverse reactions were noted. Post injection instructions were given.

## 2022-11-21 DIAGNOSIS — N30.00 ACUTE CYSTITIS WITHOUT HEMATURIA: ICD-10-CM

## 2022-11-21 DIAGNOSIS — D72.9 WHITE BLOOD CELL ABNORMALITY: ICD-10-CM

## 2022-11-21 DIAGNOSIS — G89.29 CHRONIC PAIN OF BOTH KNEES: ICD-10-CM

## 2022-11-21 DIAGNOSIS — M25.561 CHRONIC PAIN OF BOTH KNEES: ICD-10-CM

## 2022-11-21 DIAGNOSIS — M25.562 CHRONIC PAIN OF BOTH KNEES: ICD-10-CM

## 2022-11-21 DIAGNOSIS — D50.8 OTHER IRON DEFICIENCY ANEMIA: ICD-10-CM

## 2022-11-21 RX ORDER — FERROUS SULFATE TAB 325 MG (65 MG ELEMENTAL FE) 325 (65 FE) MG
TAB ORAL
Qty: 90 TABLET | Refills: 3 | Status: SHIPPED | OUTPATIENT
Start: 2022-11-21

## 2022-12-08 RX ORDER — CELECOXIB 200 MG/1
200 CAPSULE ORAL 2 TIMES DAILY
Qty: 180 CAPSULE | Refills: 0 | Status: SHIPPED | OUTPATIENT
Start: 2022-12-08

## 2022-12-19 DIAGNOSIS — E78.00 HYPERCHOLESTEROLEMIA: ICD-10-CM

## 2022-12-19 DIAGNOSIS — N95.1 HOT FLASHES, MENOPAUSAL: ICD-10-CM

## 2022-12-19 DIAGNOSIS — I10 ESSENTIAL HYPERTENSION: ICD-10-CM

## 2022-12-19 DIAGNOSIS — R73.03 PREDIABETES: ICD-10-CM

## 2022-12-19 RX ORDER — HYDROCHLOROTHIAZIDE 25 MG/1
TABLET ORAL
Qty: 90 TABLET | Refills: 0 | Status: SHIPPED | OUTPATIENT
Start: 2022-12-19

## 2023-03-20 DIAGNOSIS — E78.00 HYPERCHOLESTEROLEMIA: ICD-10-CM

## 2023-03-20 DIAGNOSIS — I10 ESSENTIAL HYPERTENSION: ICD-10-CM

## 2023-03-20 DIAGNOSIS — R73.03 PREDIABETES: ICD-10-CM

## 2023-03-20 DIAGNOSIS — N95.1 HOT FLASHES, MENOPAUSAL: ICD-10-CM

## 2023-03-20 DIAGNOSIS — G47.00 INSOMNIA, UNSPECIFIED TYPE: ICD-10-CM

## 2023-03-20 NOTE — TELEPHONE ENCOUNTER
Last Visit: 8/2/22 with MD Tom Cabrera  Next Appointment: 3/28/23 with MD Tom Cabrera  Previous Refill Encounter(s): 3/4/22 #90 with 3 refills    Requested Prescriptions     Pending Prescriptions Disp Refills    simvastatin (ZOCOR) 10 mg tablet 90 Tablet 3     Sig: Take 1 Tablet by mouth nightly. For Pharmacy Admin Tracking Only    Program: Medication Refill  CPA in place:   Recommendation Provided To:    Intervention Detail: New Rx: 1, reason: Patient Preference  Intervention Accepted By:   Beck Humphreys Closed?:   Time Spent (min): 5

## 2023-03-23 DIAGNOSIS — E78.00 HYPERCHOLESTEROLEMIA: ICD-10-CM

## 2023-03-23 DIAGNOSIS — R73.03 PREDIABETES: ICD-10-CM

## 2023-03-23 DIAGNOSIS — N95.1 HOT FLASHES, MENOPAUSAL: ICD-10-CM

## 2023-03-23 DIAGNOSIS — I10 ESSENTIAL HYPERTENSION: ICD-10-CM

## 2023-03-23 RX ORDER — SIMVASTATIN 10 MG/1
10 TABLET, FILM COATED ORAL
Qty: 90 TABLET | Refills: 3 | Status: SHIPPED | OUTPATIENT
Start: 2023-03-23

## 2023-03-23 RX ORDER — CELECOXIB 200 MG/1
CAPSULE ORAL
Qty: 180 CAPSULE | Refills: 0 | Status: SHIPPED | OUTPATIENT
Start: 2023-03-23

## 2023-03-23 RX ORDER — HYDROCHLOROTHIAZIDE 25 MG/1
TABLET ORAL
Qty: 90 TABLET | Refills: 0 | Status: SHIPPED | OUTPATIENT
Start: 2023-03-23

## 2023-03-28 ENCOUNTER — OFFICE VISIT (OUTPATIENT)
Dept: FAMILY MEDICINE CLINIC | Age: 73
End: 2023-03-28
Payer: MEDICARE

## 2023-03-28 VITALS
BODY MASS INDEX: 26.93 KG/M2 | HEIGHT: 63 IN | WEIGHT: 152 LBS | OXYGEN SATURATION: 96 % | SYSTOLIC BLOOD PRESSURE: 126 MMHG | DIASTOLIC BLOOD PRESSURE: 75 MMHG | TEMPERATURE: 98.2 F | RESPIRATION RATE: 16 BRPM | HEART RATE: 72 BPM

## 2023-03-28 DIAGNOSIS — D72.9 WHITE BLOOD CELL ABNORMALITY: ICD-10-CM

## 2023-03-28 DIAGNOSIS — D50.8 OTHER IRON DEFICIENCY ANEMIA: ICD-10-CM

## 2023-03-28 DIAGNOSIS — E78.00 HYPERCHOLESTEROLEMIA: ICD-10-CM

## 2023-03-28 DIAGNOSIS — Z12.11 SCREENING FOR COLON CANCER: ICD-10-CM

## 2023-03-28 DIAGNOSIS — Z00.00 MEDICARE ANNUAL WELLNESS VISIT, SUBSEQUENT: Primary | ICD-10-CM

## 2023-03-28 DIAGNOSIS — E55.9 VITAMIN D DEFICIENCY: ICD-10-CM

## 2023-03-28 DIAGNOSIS — Z12.31 SCREENING MAMMOGRAM FOR BREAST CANCER: ICD-10-CM

## 2023-03-28 DIAGNOSIS — Z71.89 ADVANCED DIRECTIVES, COUNSELING/DISCUSSION: ICD-10-CM

## 2023-03-28 DIAGNOSIS — R73.03 PREDIABETES: ICD-10-CM

## 2023-03-28 DIAGNOSIS — I10 ESSENTIAL HYPERTENSION: ICD-10-CM

## 2023-03-28 DIAGNOSIS — N30.01 ACUTE CYSTITIS WITH HEMATURIA: ICD-10-CM

## 2023-03-28 PROCEDURE — 3074F SYST BP LT 130 MM HG: CPT | Performed by: FAMILY MEDICINE

## 2023-03-28 PROCEDURE — G0439 PPPS, SUBSEQ VISIT: HCPCS | Performed by: FAMILY MEDICINE

## 2023-03-28 PROCEDURE — 1123F ACP DISCUSS/DSCN MKR DOCD: CPT | Performed by: FAMILY MEDICINE

## 2023-03-28 PROCEDURE — G8427 DOCREV CUR MEDS BY ELIG CLIN: HCPCS | Performed by: FAMILY MEDICINE

## 2023-03-28 PROCEDURE — G8510 SCR DEP NEG, NO PLAN REQD: HCPCS | Performed by: FAMILY MEDICINE

## 2023-03-28 PROCEDURE — 3017F COLORECTAL CA SCREEN DOC REV: CPT | Performed by: FAMILY MEDICINE

## 2023-03-28 PROCEDURE — 1101F PT FALLS ASSESS-DOCD LE1/YR: CPT | Performed by: FAMILY MEDICINE

## 2023-03-28 PROCEDURE — G8417 CALC BMI ABV UP PARAM F/U: HCPCS | Performed by: FAMILY MEDICINE

## 2023-03-28 PROCEDURE — 99497 ADVNCD CARE PLAN 30 MIN: CPT | Performed by: FAMILY MEDICINE

## 2023-03-28 PROCEDURE — G9899 SCRN MAM PERF RSLTS DOC: HCPCS | Performed by: FAMILY MEDICINE

## 2023-03-28 PROCEDURE — G8399 PT W/DXA RESULTS DOCUMENT: HCPCS | Performed by: FAMILY MEDICINE

## 2023-03-28 PROCEDURE — G8536 NO DOC ELDER MAL SCRN: HCPCS | Performed by: FAMILY MEDICINE

## 2023-03-28 PROCEDURE — 3078F DIAST BP <80 MM HG: CPT | Performed by: FAMILY MEDICINE

## 2023-03-28 RX ORDER — MULTIVITAMIN
1 TABLET ORAL DAILY
Qty: 60 TABLET | Refills: 11 | Status: SHIPPED | OUTPATIENT
Start: 2023-03-28

## 2023-03-28 NOTE — PATIENT INSTRUCTIONS
Medicare Wellness Visit, Female     The best way to live healthy is to have a lifestyle where you eat a well-balanced diet, exercise regularly, limit alcohol use, and quit all forms of tobacco/nicotine, if applicable. Regular preventive services are another way to keep healthy. Preventive services (vaccines, screening tests, monitoring & exams) can help personalize your care plan, which helps you manage your own care. Screening tests can find health problems at the earliest stages, when they are easiest to treat. [facility] follows the current, evidence-based guidelines published by the [facility] ([facility]) when recommending preventive services for our patients. Because we follow these guidelines, sometimes recommendations change over time as research supports it. (For example, mammograms used to be recommended annually. Even though Medicare will still pay for an annual mammogram, the newer guidelines recommend a mammogram every two years for women of average risk). Of course, you and your doctor may decide to screen more often for some diseases, based on your risk and your co-morbidities (chronic disease you are already diagnosed with). Preventive services for you include:  - Medicare offers their members a free annual wellness visit, which is time for you and your primary care provider to discuss and plan for your preventive service needs.  Take advantage of this benefit every year!    -Over the age of 72 should receive the recommended pneumonia vaccines.    -All adults should have a flu vaccine yearly.  -All adults should have a tetanus vaccine every 10 years.   -Over the age 48 should receive the shingles vaccines.        -All adults should be screened once for Hepatitis C.  -All adults age 38-68 who are overweight should have a diabetes screening test once every three years.   -Other screening tests and preventive services for persons with diabetes include: an eye exam to screen for diabetic retinopathy, a kidney function test, a foot exam, and stricter control over your cholesterol.   -Cardiovascular screening for adults with routine risk involves an electrocardiogram (ECG) at intervals determined by your doctor.     -Colorectal cancer screenings should be done for adults age 39-70 with no increased risk factors for colorectal cancer. There are a number of acceptable methods of screening for this type of cancer. Each test has its own benefits and drawbacks. Discuss with your doctor what is most appropriate for you during your annual wellness visit. The different tests include: colonoscopy (considered the best screening method), a fecal occult blood test, a fecal DNA test, and sigmoidoscopy.    -Lung cancer screening is recommended annually with a low dose CT scan for adults between age 54 and 68, who have smoked at least 30 pack years (equivalent of 1 pack per day for 30 days), and who is a current smoker or quit less than 15 years ago.    -A bone mass density test is recommended when a woman turns 65 to screen for osteoporosis. This test is only recommended one time, as a screening. Some providers will use this same test as a disease monitoring tool if you already have osteoporosis. -Breast cancer screenings are recommended every other year for women of normal risk, age 54-69.    -Cervical cancer screenings for women over age 72 are only recommended with certain risk factors.      Here is a list of your current Health Maintenance items (your personalized list of preventive services) with a due date:  Health Maintenance Due   Topic Date Due    Hemoglobin A1C    04/02/2019    Cholesterol Test   01/04/2023

## 2023-03-28 NOTE — LETTER
3/30/2023    Ms. Maureen Lopez  [address] [place] [id number]    Recent Results (from the past 672 hour(s))   VITAMIN D, 25 HYDROXY    Collection Time: 03/28/23 10:40 AM   Result Value Ref Range    Vitamin D 25-Hydroxy 18.1 (L) 30 - 100 ng/mL   URINALYSIS W/ RFLX MICROSCOPIC    Collection Time: 03/28/23 10:40 AM   Result Value Ref Range    Color YELLOW/STRAW      Appearance TURBID (A) CLEAR      Specific gravity 1.022 1.003 - 1.030      pH (UA) 6.5 5.0 - 8.0      Protein TRACE (A) Negative mg/dL    Glucose Negative Negative mg/dL    Ketone Negative Negative mg/dL    Bilirubin Negative Negative      Blood TRACE (A) Negative      Urobilinogen 0.2 0.2 - 1.0 EU/dL    Nitrites Positive (A) Negative      Leukocyte Esterase MODERATE (A) Negative     HEMOGLOBIN A1C WITH EAG    Collection Time: 03/28/23 10:40 AM   Result Value Ref Range    Hemoglobin A1c 5.7 (H) 4.0 - 5.6 %    Est. average glucose 397 mg/dL   METABOLIC PANEL, COMPREHENSIVE    Collection Time: 03/28/23 10:40 AM   Result Value Ref Range    Sodium 142 136 - 145 mmol/L    Potassium 4.0 3.5 - 5.1 mmol/L    Chloride 107 97 - 108 mmol/L    CO2 33 (H) 21 - 32 mmol/L    Anion gap 2 (L) 5 - 15 mmol/L    Glucose 99 65 - 100 mg/dL    BUN 15 6 - 20 MG/DL    Creatinine 0.66 0.55 - 1.02 MG/DL    BUN/Creatinine ratio 23 (H) 12 - 20      eGFR >60 >60 ml/min/1.73m2    Calcium 9.2 8.5 - 10.1 MG/DL    Bilirubin, total 0.4 0.2 - 1.0 MG/DL    ALT (SGPT) 16 12 - 78 U/L    AST (SGOT) 12 (L) 15 - 37 U/L    Alk.  phosphatase 73 45 - 117 U/L    Protein, total 7.7 6.4 - 8.2 g/dL    Albumin 3.7 3.5 - 5.0 g/dL    Globulin 4.0 2.0 - 4.0 g/dL    A-G Ratio 0.9 (L) 1.1 - 2.2     FERRITIN    Collection Time: 03/28/23 10:40 AM   Result Value Ref Range    Ferritin 163 26 - 388 NG/ML   LIPID PANEL    Collection Time: 03/28/23 10:40 AM   Result Value Ref Range    Cholesterol, total 227 (H) <200 MG/DL    Triglyceride 119 <150 MG/DL    HDL Cholesterol 58 MG/DL    LDL, calculated 145.2 (H) 0 - 100 MG/DL    VLDL, calculated 23.8 MG/DL    CHOL/HDL Ratio 3.9 0.0 - 5.0     CBC W/O DIFF    Collection Time: 03/28/23 10:40 AM   Result Value Ref Range    WBC 8.4 3.6 - 11.0 K/uL    RBC 5.15 3.80 - 5.20 M/uL    HGB 13.3 11.5 - 16.0 g/dL    HCT 43.5 35.0 - 47.0 %    MCV 84.5 80.0 - 99.0 FL    MCH 25.8 (L) 26.0 - 34.0 PG    MCHC 30.6 30.0 - 36.5 g/dL    RDW 15.3 (H) 11.5 - 14.5 %    PLATELET 481 793 - 861 K/uL    MPV 10.8 8.9 - 12.9 FL    NRBC 0.0 0  WBC    ABSOLUTE NRBC 0.00 0.00 - 0.01 K/uL   TSH 3RD GENERATION    Collection Time: 03/28/23 10:40 AM   Result Value Ref Range    TSH 1.45 0.36 - 3.74 uIU/mL           RECOMMENDATIONS:    I am writting to tell you that your lab results are normal, except for low level of vitamin D and also urinary tract infection prescription has been called into the pharmacy    Keep up the good work! Work on diet and exercise. Continue with current  diet and medications.          Please call me if you have any questions: [phone]    Sincerely,      Blossom Holloway MD

## 2023-03-28 NOTE — ACP (ADVANCE CARE PLANNING)
Advance Care Planning     Advance Care Planning (ACP) Physician/NP/PA Conversation      Date of Conversation: 3/28/2023  Conducted with: Patient with Decision Making Capacity    Conversation Conducted with:   Patient with Decision Making Capacity, stating that the Other Legally Authorized Decision Maker would be Spouse as SDM, Patient is on presence of ++ family member the , stated that the pt wants to be not DNR at this time,  The pt likes to be a full code individual,  The patient states that there is a lot of will to live,      Conversation Outcomes / Follow-Up Plan:   Completed new Advance Directive      Length of ACP Conversation in minutes:  12 minutes        Batsheva Weaver MD

## 2023-03-28 NOTE — PROGRESS NOTES
Identified pt with two pt identifiers(name and ). Reviewed record in preparation for visit and have obtained necessary documentation. Chief Complaint   Patient presents with    Annual Wellness Visit        Vitals:    23 0919   BP: 126/75   Pulse: 72   Resp: 16   Temp: 98.2 °F (36.8 °C)   TempSrc: Oral   SpO2: 96%   Weight: 152 lb (68.9 kg)   Height: 5' 3\" (1.6 m)   PainSc:   2   PainLoc: Knee       Health Maintenance Due   Topic    A1C test (Diabetic or Prediabetic)     Medicare Yearly Exam     Lipid Screen        Coordination of Care Questionnaire:  :   1) Have you been to an emergency room, urgent care, or hospitalized since your last visit? If yes, where when, and reason for visit? no       2. Have seen or consulted any other health care provider since your last visit? If yes, where when, and reason for visit? NO      Patient is accompanied by  I have received verbal consent from Naveen Low to discuss any/all medical information while they are present in the room.

## 2023-03-28 NOTE — PROGRESS NOTES
This is the Subsequent Medicare Annual Wellness Exam, performed 12 months or more after the Initial AWV or the last Subsequent AWV    I have reviewed the patient's medical history in detail and updated the computerized patient record. Assessment/Plan   Education and counseling provided:  Are appropriate based on today's review and evaluation  End-of-Life planning (with patient's consent)  Pneumococcal Vaccine  Influenza Vaccine  Screening Mammography  Screening Pap and pelvic (covered once every 2 years)  Colorectal cancer screening tests  Bone mass measurement (DEXA)  Diabetes screening test    1. Medicare annual wellness visit, subsequent  -     [facility] MAMMO BI SCREENING INCL CAD; Future  2. Advanced directives, counseling/discussion  -     ADVANCE CARE PLANNING FIRST 30 MINS  -     FULL CODE  3. Essential hypertension  -     CBC W/O DIFF; Future  -     LIPID PANEL; Future  -     FERRITIN; Future  -     METABOLIC PANEL, COMPREHENSIVE; Future  -     HEMOGLOBIN A1C WITH EAG; Future  -     TSH 3RD GENERATION; Future  -     URINALYSIS W/ RFLX MICROSCOPIC; Future  -     VITAMIN D, 25 HYDROXY; Future  4. Prediabetes  -     CBC W/O DIFF; Future  -     LIPID PANEL; Future  -     FERRITIN; Future  -     METABOLIC PANEL, COMPREHENSIVE; Future  -     HEMOGLOBIN A1C WITH EAG; Future  -     TSH 3RD GENERATION; Future  -     URINALYSIS W/ RFLX MICROSCOPIC; Future  -     VITAMIN D, 25 HYDROXY; Future  5. Hypercholesterolemia  -     CBC W/O DIFF; Future  -     LIPID PANEL; Future  -     FERRITIN; Future  -     METABOLIC PANEL, COMPREHENSIVE; Future  -     HEMOGLOBIN A1C WITH EAG; Future  -     TSH 3RD GENERATION; Future  -     URINALYSIS W/ RFLX MICROSCOPIC; Future  -     VITAMIN D, 25 HYDROXY; Future  6. Other iron deficiency anemia  -     CBC W/O DIFF; Future  -     LIPID PANEL; Future  -     FERRITIN; Future  -     METABOLIC PANEL, COMPREHENSIVE; Future  -     HEMOGLOBIN A1C WITH EAG;  Future  -     TSH 3RD GENERATION; Future  -     URINALYSIS W/ RFLX MICROSCOPIC; Future  -     VITAMIN D, 25 HYDROXY; Future  7. White blood cell abnormality  -     CBC W/O DIFF; Future  -     LIPID PANEL; Future  -     FERRITIN; Future  -     METABOLIC PANEL, COMPREHENSIVE; Future  -     HEMOGLOBIN A1C WITH EAG; Future  -     TSH 3RD GENERATION; Future  -     URINALYSIS W/ RFLX MICROSCOPIC; Future  -     VITAMIN D, 25 HYDROXY; Future  8. Screening mammogram for breast cancer  -     [facility] MAMMO BI SCREENING INCL CAD; Future  9. Screening for colon cancer  -     REFERRAL TO GASTROENTEROLOGY       Depression Risk Factor Screening     3 most recent PHQ Screens 3/28/2023   Little interest or pleasure in doing things Not at all   Feeling down, depressed, irritable, or hopeless Not at all   Total Score PHQ 2 0       Alcohol & Drug Abuse Risk Screen    Do you average more than 1 drink per night or more than 7 drinks a week:  No    On any one occasion in the past three months have you have had more than 3 drinks containing alcohol:  No          Functional Ability and Level of Safety    Hearing: Hearing is good. Activities of Daily Living: The home contains: handrails, grab bars, and rugs  Patient does total self care      Ambulation: with no difficulty     Fall Risk:  Fall Risk Assessment, last 12 mths 3/28/2023   Able to walk? Yes   Fall in past 12 months? 0   Do you feel unsteady?  0   Are you worried about falling 0      Abuse Screen:  Patient is not abused       Cognitive Screening    Has your family/caregiver stated any concerns about your memory: no     Cognitive Screening: Normal - MMSE (Mini Mental Status Exam)    Health Maintenance Due     Health Maintenance Due   Topic Date Due    A1C test (Diabetic or Prediabetic)  04/02/2019    Lipid Screen  01/04/2023       Patient Care Team   Patient Care Team:  Zaira Berry MD as PCP - General (Family Medicine)  Zaira Berry MD as PCP - [facility] Empaneled Provider    History     Patient Active Problem List   Diagnosis Code    HTN (hypertension) I10    Insomnia G47.00    Hot flashes, menopausal N95.1    Osteopenia M85.80    Cellulitis L03.90    Candida vaginitis B37.31    Vitamin D deficiency E55.9    Hypercholesterolemia E78.00    Prediabetes R73.03    Rash, skin R21    S/P partial hysterectomy Z90.711    Knee pain, right M25.561    Recurrent sinusitis J32.9     Past Medical History:   Diagnosis Date    Arthritis     Contact dermatitis and other eczema, due to unspecified cause     Hot flashes, menopausal 04/21/2010    HTN (hypertension) 03/29/2010    Insomnia 03/29/2010    Knee pain, right 06/04/2015    Osteopenia 04/21/2010    Prediabetes 06/08/2012    Recurrent sinusitis 03/07/2016    Screening for osteoporosis 03/07/2016      Past Surgical History:   Procedure Laterality Date    COLONOSCOPY N/A 1/24/2020    COLONOSCOPY performed by Ronny Baumann MD at [facility] ENDOSCOPY    COLONOSCOPY N/A 1/21/2021    COLONOSCOPY performed by Ronny Baumann MD at [facility] ENDOSCOPY    ENDOSCOPY, COLON, DIAGNOSTIC      in 2009    HX BREAST BIOPSY Left 1970    benign:  no visible scar    HX HYSTERECTOMY      40's     Current Outpatient Medications   Medication Sig Dispense Refill    hydroCHLOROthiazide (HYDRODIURIL) 25 mg tablet TAKE ONE TABLET BY MOUTH DAILY 90 Tablet 0    celecoxib (CELEBREX) 200 mg capsule TAKE ONE CAPSULE BY MOUTH TWICE A  Capsule 0    FeroSuL 325 mg (65 mg iron) tablet TAKE ONE TABLET BY MOUTH THREE TIMES A DAY WITH MEALS 90 Tablet 3    acetaminophen (Tylenol Arthritis Pain) 650 mg TbER Take 1 Tablet by mouth every eight (8) hours. 20 Tablet 0    venlafaxine (EFFEXOR) 75 mg tablet Take 1 Tablet by mouth daily. 90 Tablet 3    aspirin 81 mg chewable tablet Take 1 Tab by mouth daily. 30 Tab 11    simvastatin (ZOCOR) 10 mg tablet Take 1 Tablet by mouth nightly.  (Patient not taking: Reported on 3/28/2023) 90 Tablet 3    predniSONE (STERAPRED) 5 mg dose pack See administration instruction per 5mg dose pack (Patient not taking: No sig reported) 21 Tablet 0    lidocaine (LIDODERM) 5 % Apply patch to the affected area for 12 hours a day and remove for 12 hours a day.  (Patient not taking: No sig reported) 10 Each 0     No Known Allergies    Family History   Problem Relation Age of Onset    Stroke Mother     Hypertension Sister     Hypertension Brother     Hypertension Sister     Hypertension Brother      Social History     Tobacco Use    Smoking status: Former    Smokeless tobacco: Never    Tobacco comments:     quit 30 yrs ago   Substance Use Topics    Alcohol use: Yes     Comment: 2 beer per week         Ashley Holt MD

## 2023-03-29 LAB
25(OH)D3 SERPL-MCNC: 18.1 NG/ML (ref 30–100)
ALBUMIN SERPL-MCNC: 3.7 G/DL (ref 3.5–5)
ALBUMIN/GLOB SERPL: 0.9 (ref 1.1–2.2)
ALP SERPL-CCNC: 73 U/L (ref 45–117)
ALT SERPL-CCNC: 16 U/L (ref 12–78)
ANION GAP SERPL CALC-SCNC: 2 MMOL/L (ref 5–15)
APPEARANCE UR: ABNORMAL
AST SERPL-CCNC: 12 U/L (ref 15–37)
BILIRUB SERPL-MCNC: 0.4 MG/DL (ref 0.2–1)
BILIRUB UR QL: NEGATIVE
BUN SERPL-MCNC: 15 MG/DL (ref 6–20)
BUN/CREAT SERPL: 23 (ref 12–20)
CALCIUM SERPL-MCNC: 9.2 MG/DL (ref 8.5–10.1)
CHLORIDE SERPL-SCNC: 107 MMOL/L (ref 97–108)
CHOLEST SERPL-MCNC: 227 MG/DL
CO2 SERPL-SCNC: 33 MMOL/L (ref 21–32)
COLOR UR: ABNORMAL
CREAT SERPL-MCNC: 0.66 MG/DL (ref 0.55–1.02)
ERYTHROCYTE [DISTWIDTH] IN BLOOD BY AUTOMATED COUNT: 15.3 % (ref 11.5–14.5)
EST. AVERAGE GLUCOSE BLD GHB EST-MCNC: 117 MG/DL
FERRITIN SERPL-MCNC: 163 NG/ML (ref 26–388)
GLOBULIN SER CALC-MCNC: 4 G/DL (ref 2–4)
GLUCOSE SERPL-MCNC: 99 MG/DL (ref 65–100)
GLUCOSE UR STRIP.AUTO-MCNC: NEGATIVE MG/DL
HBA1C MFR BLD: 5.7 % (ref 4–5.6)
HCT VFR BLD AUTO: 43.5 % (ref 35–47)
HDLC SERPL-MCNC: 58 MG/DL
HDLC SERPL: 3.9 (ref 0–5)
HGB BLD-MCNC: 13.3 G/DL (ref 11.5–16)
HGB UR QL STRIP: ABNORMAL
KETONES UR QL STRIP.AUTO: NEGATIVE MG/DL
LDLC SERPL CALC-MCNC: 145.2 MG/DL (ref 0–100)
LEUKOCYTE ESTERASE UR QL STRIP.AUTO: ABNORMAL
MCH RBC QN AUTO: 25.8 PG (ref 26–34)
MCHC RBC AUTO-ENTMCNC: 30.6 G/DL (ref 30–36.5)
MCV RBC AUTO: 84.5 FL (ref 80–99)
NITRITE UR QL STRIP.AUTO: POSITIVE
NRBC # BLD: 0 K/UL (ref 0–0.01)
NRBC BLD-RTO: 0 PER 100 WBC
PH UR STRIP: 6.5 (ref 5–8)
PLATELET # BLD AUTO: 288 K/UL (ref 150–400)
PMV BLD AUTO: 10.8 FL (ref 8.9–12.9)
POTASSIUM SERPL-SCNC: 4 MMOL/L (ref 3.5–5.1)
PROT SERPL-MCNC: 7.7 G/DL (ref 6.4–8.2)
PROT UR STRIP-MCNC: ABNORMAL MG/DL
RBC # BLD AUTO: 5.15 M/UL (ref 3.8–5.2)
SODIUM SERPL-SCNC: 142 MMOL/L (ref 136–145)
SP GR UR REFRACTOMETRY: 1.02 (ref 1–1.03)
TRIGL SERPL-MCNC: 119 MG/DL (ref ?–150)
TSH SERPL DL<=0.05 MIU/L-ACNC: 1.45 UIU/ML (ref 0.36–3.74)
UROBILINOGEN UR QL STRIP.AUTO: 0.2 EU/DL (ref 0.2–1)
VLDLC SERPL CALC-MCNC: 23.8 MG/DL
WBC # BLD AUTO: 8.4 K/UL (ref 3.6–11)

## 2023-03-30 RX ORDER — ERGOCALCIFEROL 1.25 MG/1
50000 CAPSULE ORAL
Qty: 4 CAPSULE | Refills: 11 | Status: SHIPPED | OUTPATIENT
Start: 2023-03-30

## 2023-03-30 RX ORDER — NITROFURANTOIN 25; 75 MG/1; MG/1
100 CAPSULE ORAL 2 TIMES DAILY
Qty: 14 CAPSULE | Refills: 0 | Status: SHIPPED | OUTPATIENT
Start: 2023-03-30 | End: 2023-04-06

## 2023-04-18 DIAGNOSIS — D72.9 WHITE BLOOD CELL ABNORMALITY: ICD-10-CM

## 2023-04-18 DIAGNOSIS — N30.00 ACUTE CYSTITIS WITHOUT HEMATURIA: ICD-10-CM

## 2023-04-18 DIAGNOSIS — D50.8 OTHER IRON DEFICIENCY ANEMIA: ICD-10-CM

## 2023-04-18 DIAGNOSIS — M25.562 CHRONIC PAIN OF BOTH KNEES: ICD-10-CM

## 2023-04-18 DIAGNOSIS — G89.29 CHRONIC PAIN OF BOTH KNEES: ICD-10-CM

## 2023-04-18 DIAGNOSIS — M25.561 CHRONIC PAIN OF BOTH KNEES: ICD-10-CM

## 2023-04-19 RX ORDER — FERROUS SULFATE TAB 325 MG (65 MG ELEMENTAL FE) 325 (65 FE) MG
TAB ORAL
Qty: 90 TABLET | Refills: 3 | Status: SHIPPED | OUTPATIENT
Start: 2023-04-19

## 2023-06-21 RX ORDER — HYDROCHLOROTHIAZIDE 25 MG/1
TABLET ORAL
Qty: 90 TABLET | Refills: 1 | Status: SHIPPED | OUTPATIENT
Start: 2023-06-21

## 2023-06-26 RX ORDER — CELECOXIB 200 MG/1
CAPSULE ORAL
Qty: 180 CAPSULE | Refills: 0 | Status: SHIPPED | OUTPATIENT
Start: 2023-06-26

## 2023-09-20 RX ORDER — FERROUS SULFATE 325(65) MG
TABLET ORAL
Qty: 90 TABLET | Refills: 3 | Status: SHIPPED | OUTPATIENT
Start: 2023-09-20

## 2023-09-28 ENCOUNTER — TELEPHONE (OUTPATIENT)
Age: 73
End: 2023-09-28

## 2023-09-28 NOTE — TELEPHONE ENCOUNTER
Patient's pharmacy is requesting a refill on the patient's prescribed celecoxib 200 mg and to have it sent to [facility] [id number] [place], [place] [phone]

## 2023-09-29 NOTE — TELEPHONE ENCOUNTER
LVM letting the patient know she will need to have an apt before she can receive a refill on her medication.

## 2023-10-06 ENCOUNTER — OFFICE VISIT (OUTPATIENT)
Age: 73
End: 2023-10-06

## 2023-10-06 VITALS
RESPIRATION RATE: 16 BRPM | DIASTOLIC BLOOD PRESSURE: 80 MMHG | HEART RATE: 69 BPM | WEIGHT: 147 LBS | SYSTOLIC BLOOD PRESSURE: 150 MMHG | BODY MASS INDEX: 26.05 KG/M2 | TEMPERATURE: 97 F | HEIGHT: 63 IN | OXYGEN SATURATION: 97 %

## 2023-10-06 DIAGNOSIS — M17.11 UNILATERAL PRIMARY OSTEOARTHRITIS, RIGHT KNEE: Primary | ICD-10-CM

## 2023-10-06 RX ORDER — BETAMETHASONE SODIUM PHOSPHATE AND BETAMETHASONE ACETATE 3; 3 MG/ML; MG/ML
6 INJECTION, SUSPENSION INTRA-ARTICULAR; INTRALESIONAL; INTRAMUSCULAR; SOFT TISSUE ONCE
Status: COMPLETED | OUTPATIENT
Start: 2023-10-06 | End: 2023-10-06

## 2023-10-06 RX ORDER — CELECOXIB 200 MG/1
200 CAPSULE ORAL 2 TIMES DAILY PRN
Qty: 60 CAPSULE | Refills: 3 | Status: SHIPPED | OUTPATIENT
Start: 2023-10-06

## 2023-10-06 RX ADMIN — BETAMETHASONE SODIUM PHOSPHATE AND BETAMETHASONE ACETATE 6 MG: 3; 3 INJECTION, SUSPENSION INTRA-ARTICULAR; INTRALESIONAL; INTRAMUSCULAR; SOFT TISSUE at 11:03

## 2023-10-06 ASSESSMENT — PATIENT HEALTH QUESTIONNAIRE - PHQ9
2. FEELING DOWN, DEPRESSED OR HOPELESS: 0
SUM OF ALL RESPONSES TO PHQ QUESTIONS 1-9: 0
SUM OF ALL RESPONSES TO PHQ9 QUESTIONS 1 & 2: 0
SUM OF ALL RESPONSES TO PHQ QUESTIONS 1-9: 0
SUM OF ALL RESPONSES TO PHQ QUESTIONS 1-9: 0
1. LITTLE INTEREST OR PLEASURE IN DOING THINGS: 0
SUM OF ALL RESPONSES TO PHQ QUESTIONS 1-9: 0

## 2023-10-06 NOTE — PROGRESS NOTES
10/6/2023      CC: right knee pain    HPI:      This is a 75y.o. year old female who presents for a follow up visit. The patient was last seen and diagnosed with right knee osteoarthritis. The patient's treatments since the most recent visit have comprised of celebrex. The patient has had good relief of the chief complaint.         PMH:  Past Medical History:   Diagnosis Date    Arthritis     Contact dermatitis and other eczema, due to unspecified cause     Hot flashes, menopausal 04/21/2010    HTN (hypertension) 03/29/2010    Insomnia 03/29/2010    Knee pain, right 06/04/2015    Osteopenia 04/21/2010    Prediabetes 06/08/2012    Recurrent sinusitis 03/07/2016    Screening for osteoporosis 03/07/2016       PSxHx:  Past Surgical History:   Procedure Laterality Date    BREAST BIOPSY Left 1970    benign:  no visible scar    COLONOSCOPY N/A 1/24/2020    COLONOSCOPY performed by Pilar Jimenez MD at [address] [place] [address],[address]      in 2009    COLONOSCOPY N/A 1/21/2021    COLONOSCOPY performed by Pilar Jimenez MD at [facility] ENDOSCOPY    HYSTERECTOMY ([facility])      40's       Meds:    Current Outpatient Medications:     FEROSUL 325 (65 Fe) MG tablet, TAKE ONE TABLET BY MOUTH THREE TIMES A DAY WITH MEALS, Disp: 90 tablet, Rfl: 3    celecoxib (CELEBREX) 200 MG capsule, TAKE ONE CAPSULE BY MOUTH TWICE A DAY, Disp: 180 capsule, Rfl: 0    hydroCHLOROthiazide (HYDRODIURIL) 25 MG tablet, TAKE ONE TABLET BY MOUTH DAILY, Disp: 90 tablet, Rfl: 1    acetaminophen (TYLENOL) 650 MG extended release tablet, Take by mouth every 8 hours, Disp: , Rfl:     aspirin 81 MG chewable tablet, Take by mouth daily, Disp: , Rfl:     lidocaine (LIDODERM) 5 %, Apply patch to the affected area for 12 hours a day and remove for 12 hours a day., Disp: , Rfl:     predniSONE (DELTASONE) 5 MG tablet, See administration instruction per 5mg dose pack, Disp: , Rfl:     simvastatin (ZOCOR) 10 MG tablet, Take by mouth, Disp: , Rfl:

## 2023-10-18 ENCOUNTER — HOSPITAL ENCOUNTER (OUTPATIENT)
Facility: HOSPITAL | Age: 73
Discharge: HOME | End: 2023-10-21
Attending: FAMILY MEDICINE
Payer: MEDICARE

## 2023-10-18 ENCOUNTER — NURSE ONLY (OUTPATIENT)
Age: 73
End: 2023-10-18
Payer: MEDICARE

## 2023-10-18 VITALS — HEIGHT: 61 IN | WEIGHT: 147 LBS | BODY MASS INDEX: 27.75 KG/M2

## 2023-10-18 VITALS — TEMPERATURE: 98.4 F

## 2023-10-18 DIAGNOSIS — Z12.31 VISIT FOR SCREENING MAMMOGRAM: ICD-10-CM

## 2023-10-18 PROCEDURE — 77063 BREAST TOMOSYNTHESIS BI: CPT

## 2023-10-18 PROCEDURE — 90694 VACC AIIV4 NO PRSRV 0.5ML IM: CPT | Performed by: FAMILY MEDICINE

## 2023-10-18 NOTE — PROGRESS NOTES
Chief Complaint   Patient presents with    Flu Vaccine     Here for annual flu vaccine. Tolerated well, with no adverse reactions noted.

## 2023-11-15 NOTE — TELEPHONE ENCOUNTER
Last appointment: 3/28/23  Next appointment: none  Previous refill encounter(s): 6/2/22    Requested Prescriptions     Pending Prescriptions Disp Refills    venlafaxine (EFFEXOR) 75 MG tablet [Pharmacy Med Name: VENLAFAXINE HCL 75 MG TABLET] 90 tablet 0     Sig: TAKE ONE TABLET BY MOUTH DAILY         For Pharmacy Admin Tracking Only    Program: Medication Refill  CPA in place:    Recommendation Provided To:    Intervention Detail: New Rx: 1, reason: Patient Preference  Intervention Accepted By:   Tom Burns Closed?:    Time Spent (min): 5

## 2023-11-17 RX ORDER — VENLAFAXINE 75 MG/1
75 TABLET ORAL DAILY
Qty: 90 TABLET | Refills: 0 | Status: SHIPPED | OUTPATIENT
Start: 2023-11-17

## 2023-12-15 RX ORDER — HYDROCHLOROTHIAZIDE 25 MG/1
25 TABLET ORAL DAILY
Qty: 90 TABLET | Refills: 1 | Status: SHIPPED | OUTPATIENT
Start: 2023-12-15

## 2024-01-04 NOTE — TELEPHONE ENCOUNTER
Last appointment: 3/28/23  Next appointment: none  Previous refill encounter(s): 7/29/22    Requested Prescriptions     Pending Prescriptions Disp Refills    ARTHRITIS PAIN RELIEF 650 MG extended release tablet [Pharmacy Med Name: ARTHRITIS PAIN  MG CAPLT] 20 tablet 0     Sig: TAKE ONE TABLET BY MOUTH EVERY 8 HOURS         For Pharmacy Admin Tracking Only    Program: Medication Refill  CPA in place:    Recommendation Provided To:   Intervention Detail: New Rx: 1, reason: Patient Preference  Intervention Accepted By:   Gap Closed?:    Time Spent (min): 5

## 2024-01-05 ENCOUNTER — TELEPHONE (OUTPATIENT)
Age: 74
End: 2024-01-05

## 2024-01-05 RX ORDER — ACETAMINOPHEN 650 MG/1
TABLET, FILM COATED, EXTENDED RELEASE ORAL
Qty: 20 TABLET | Refills: 0 | Status: SHIPPED | OUTPATIENT
Start: 2024-01-05

## 2024-01-06 ENCOUNTER — OFFICE VISIT (OUTPATIENT)
Age: 74
End: 2024-01-06

## 2024-01-06 VITALS
DIASTOLIC BLOOD PRESSURE: 74 MMHG | RESPIRATION RATE: 18 BRPM | BODY MASS INDEX: 25.44 KG/M2 | HEART RATE: 79 BPM | TEMPERATURE: 98.6 F | WEIGHT: 149 LBS | HEIGHT: 64 IN | SYSTOLIC BLOOD PRESSURE: 163 MMHG | OXYGEN SATURATION: 99 %

## 2024-01-06 DIAGNOSIS — R10.2 SUPRAPUBIC PAIN: ICD-10-CM

## 2024-01-06 DIAGNOSIS — R30.0 BURNING WITH URINATION: Primary | ICD-10-CM

## 2024-01-06 DIAGNOSIS — J06.9 UPPER RESPIRATORY TRACT INFECTION, UNSPECIFIED TYPE: ICD-10-CM

## 2024-01-06 LAB
BILIRUBIN, URINE, POC: NEGATIVE
BLOOD URINE, POC: NEGATIVE
GLUCOSE URINE, POC: NEGATIVE
KETONES, URINE, POC: NEGATIVE
LEUKOCYTE ESTERASE, URINE, POC: NEGATIVE
NITRITE, URINE, POC: NEGATIVE
PH, URINE, POC: 8.5 (ref 4.6–8)
PROTEIN,URINE, POC: ABNORMAL
SPECIFIC GRAVITY, URINE, POC: 1.02 (ref 1–1.03)
URINALYSIS CLARITY, POC: ABNORMAL
URINALYSIS COLOR, POC: YELLOW
UROBILINOGEN, POC: ABNORMAL

## 2024-01-06 RX ORDER — ERGOCALCIFEROL 1.25 MG/1
50000 CAPSULE ORAL
COMMUNITY
Start: 2023-03-30

## 2024-01-06 RX ORDER — AMOXICILLIN 875 MG/1
875 TABLET, COATED ORAL 2 TIMES DAILY
Qty: 14 TABLET | Refills: 0 | Status: SHIPPED | OUTPATIENT
Start: 2024-01-06 | End: 2024-01-13

## 2024-01-06 RX ORDER — PHENAZOPYRIDINE HYDROCHLORIDE 100 MG/1
100 TABLET, FILM COATED ORAL 3 TIMES DAILY PRN
Qty: 12 TABLET | Refills: 0 | Status: SHIPPED | OUTPATIENT
Start: 2024-01-06 | End: 2025-01-05

## 2024-01-06 ASSESSMENT — ENCOUNTER SYMPTOMS
CHEST TIGHTNESS: 0
COUGH: 1
WHEEZING: 0
NAUSEA: 0
SORE THROAT: 1

## 2024-01-06 NOTE — PROGRESS NOTES
Chief Complaint   Patient presents with    Urinary Burning     Pt reports having a burning sensation when she urinates for 2 weeks.     Chest Congestion     Pt reports having a chest congestion with a cough for 1 week.          Dysuria   This is a new problem. The current episode started 1 to 4 weeks ago (2). The problem occurs every urination. The problem has been unchanged. The quality of the pain is described as burning. The pain is mild. There has been no fever. There is No history of pyelonephritis. Pertinent negatives include no chills, discharge, flank pain, frequency, hematuria, hesitancy, nausea, sweats or urgency. She has tried nothing for the symptoms. There is no history of kidney stones, recurrent UTIs or urinary stasis.   Cough  This is a new problem. The current episode started in the past 7 days. The problem has been unchanged. The problem occurs every few minutes. The cough is Non-productive. Associated symptoms include ear congestion, nasal congestion and a sore throat. Pertinent negatives include no chills, fever, sweats or wheezing. Associated symptoms comments: Nasal and sinus . Nothing aggravates the symptoms. Risk factors for lung disease include animal exposure. She has tried nothing for the symptoms. The treatment provided no relief. There is no history of asthma.       Past Medical History:   Diagnosis Date    Arthritis     Contact dermatitis and other eczema, due to unspecified cause     Hot flashes, menopausal 04/21/2010    HTN (hypertension) 03/29/2010    Insomnia 03/29/2010    Knee pain, right 06/04/2015    Osteopenia 04/21/2010    Prediabetes 06/08/2012    Recurrent sinusitis 03/07/2016    Screening for osteoporosis 03/07/2016       Past Surgical History:   Procedure Laterality Date    BREAST BIOPSY Left 1970    benign:  no visible scar    COLONOSCOPY N/A 1/24/2020    COLONOSCOPY performed by Chapin Rios MD at [facility] ENDOSCOPY    COLONOSCOPY      in 2009    COLONOSCOPY N/A

## 2024-01-06 NOTE — PATIENT INSTRUCTIONS
Mucinex Fast max 2 tab 4 times/ day - day/ night pack   Or Dayquil/Nyquil  Zyrtec/ Allegra daily   Saline sinus rinse       More water- cranberry tablets  Follow with Urologist and Gynecologist if sxs persist

## 2024-01-06 NOTE — PROGRESS NOTES
Chief Complaint   Patient presents with    Urinary Burning     Pt reports having a burning sensation when she urinates for 2 weeks.     Chest Congestion     Pt reports having a chest congestion with a cough for 1 week.        Vitals:    01/06/24 1059   BP: (!) 163/74   Site: Left Upper Arm   Position: Sitting   Cuff Size: Medium Adult   Pulse: 79   Resp: 18   Temp: 98.6 °F (37 °C)   TempSrc: Oral   SpO2: 99%   Weight: 67.6 kg (149 lb)   Height: 1.613 m (5' 3.5\")        Med reconciliation correct and up to date with changes have been made with patient.  \"Reviewed record in preparation for visit and have obtained the necessary documentation\"

## 2024-02-26 RX ORDER — FERROUS SULFATE 325(65) MG
TABLET ORAL
Qty: 90 TABLET | Refills: 3 | Status: SHIPPED | OUTPATIENT
Start: 2024-02-26

## 2024-02-28 NOTE — TELEPHONE ENCOUNTER
Last appointment: 3/28/23  Next appointment: 4/12/24  Previous refill encounter(s): 1/5/24 #20    Requested Prescriptions     Pending Prescriptions Disp Refills    acetaminophen (TYLENOL) 650 MG extended release tablet [Pharmacy Med Name: ACETAMINOPHEN  MG TABLET] 20 tablet 0     Sig: TAKE ONE TABLET BY MOUTH EVERY 8 HOURS         For Pharmacy Admin Tracking Only    Program: Medication Refill  CPA in place:    Recommendation Provided To:   Intervention Detail: New Rx: 1, reason: Patient Preference  Intervention Accepted By:   Gap Closed?:    Time Spent (min): 5

## 2024-03-01 RX ORDER — SENNOSIDES 8.6 MG
CAPSULE ORAL
Qty: 20 TABLET | Refills: 0 | Status: SHIPPED | OUTPATIENT
Start: 2024-03-01

## 2024-04-09 RX ORDER — CALCIUM CARBONATE/VITAMIN D3 600 MG-10
1 TABLET ORAL DAILY
Qty: 180 TABLET | Refills: 3 | Status: SHIPPED | OUTPATIENT
Start: 2024-04-09

## 2024-04-09 NOTE — TELEPHONE ENCOUNTER
Last appointment: 3/28/23  Next appointment: 4/12/24  Previous refill encounter(s): 3/28/23    Requested Prescriptions     Pending Prescriptions Disp Refills    calcium carb-cholecalciferol 600-10 MG-MCG TABS per tab [Pharmacy Med Name: CALCIUM 600 MG-VIT D3 10MCG TB] 180 tablet 3     Sig: TAKE ONE TABLET BY MOUTH DAILY         For Pharmacy Admin Tracking Only    Program: Medication Refill  CPA in place:    Recommendation Provided To:   Intervention Detail: New Rx: 1, reason: Patient Preference  Intervention Accepted By:   Gap Closed?:    Time Spent (min): 5

## 2024-04-10 NOTE — TELEPHONE ENCOUNTER
Last appointment: 3/28/23  Next appointment: 4/12/24  Previous refill encounter(s): 3/30/23    Requested Prescriptions     Pending Prescriptions Disp Refills    vitamin D (ERGOCALCIFEROL) 1.25 MG (54298 UT) CAPS capsule [Pharmacy Med Name: VITAMIN D2 1.25MG(50,000 UNIT)] 12 capsule 3     Sig: TAKE ONE CAPSULE BY MOUTH ONCE WEEKLY         For Pharmacy Admin Tracking Only    Program: Medication Refill  CPA in place:    Recommendation Provided To:   Intervention Detail: New Rx: 1, reason: Patient Preference  Intervention Accepted By:   Gap Closed?:    Time Spent (min): 5

## 2024-04-11 RX ORDER — ERGOCALCIFEROL 1.25 MG/1
50000 CAPSULE ORAL WEEKLY
Qty: 12 CAPSULE | Refills: 3 | Status: SHIPPED | OUTPATIENT
Start: 2024-04-11

## 2024-04-12 ENCOUNTER — OFFICE VISIT (OUTPATIENT)
Age: 74
End: 2024-04-12
Payer: MEDICARE

## 2024-04-12 VITALS
OXYGEN SATURATION: 95 % | WEIGHT: 147 LBS | DIASTOLIC BLOOD PRESSURE: 78 MMHG | HEART RATE: 75 BPM | SYSTOLIC BLOOD PRESSURE: 124 MMHG | TEMPERATURE: 97.4 F | HEIGHT: 64 IN | BODY MASS INDEX: 25.1 KG/M2 | RESPIRATION RATE: 19 BRPM

## 2024-04-12 DIAGNOSIS — E55.9 VITAMIN D DEFICIENCY: ICD-10-CM

## 2024-04-12 DIAGNOSIS — E78.00 HYPERCHOLESTEROLEMIA: ICD-10-CM

## 2024-04-12 DIAGNOSIS — Z00.00 WELCOME TO MEDICARE PREVENTIVE VISIT: Primary | ICD-10-CM

## 2024-04-12 DIAGNOSIS — Z71.89 ACP (ADVANCE CARE PLANNING): ICD-10-CM

## 2024-04-12 DIAGNOSIS — I10 PRIMARY HYPERTENSION: ICD-10-CM

## 2024-04-12 DIAGNOSIS — R73.03 PREDIABETES: ICD-10-CM

## 2024-04-12 LAB
25(OH)D3 SERPL-MCNC: 44.8 NG/ML (ref 30–100)
ALBUMIN SERPL-MCNC: 4 G/DL (ref 3.5–5)
ALBUMIN/GLOB SERPL: 1.1 (ref 1.1–2.2)
ALP SERPL-CCNC: 75 U/L (ref 45–117)
ALT SERPL-CCNC: 18 U/L (ref 12–78)
ANION GAP SERPL CALC-SCNC: 5 MMOL/L (ref 5–15)
ANION GAP SERPL CALC-SCNC: 6 MMOL/L (ref 5–15)
APPEARANCE UR: CLEAR
AST SERPL-CCNC: 13 U/L (ref 15–37)
BACTERIA URNS QL MICRO: NEGATIVE /HPF
BILIRUB SERPL-MCNC: 0.5 MG/DL (ref 0.2–1)
BILIRUB UR QL: NEGATIVE
BUN SERPL-MCNC: 15 MG/DL (ref 6–20)
BUN SERPL-MCNC: 15 MG/DL (ref 6–20)
BUN/CREAT SERPL: 19 (ref 12–20)
BUN/CREAT SERPL: 21 (ref 12–20)
CALCIUM SERPL-MCNC: 9.4 MG/DL (ref 8.5–10.1)
CALCIUM SERPL-MCNC: 9.6 MG/DL (ref 8.5–10.1)
CAOX CRY URNS QL MICRO: ABNORMAL
CHLORIDE SERPL-SCNC: 105 MMOL/L (ref 97–108)
CHLORIDE SERPL-SCNC: 105 MMOL/L (ref 97–108)
CHOLEST SERPL-MCNC: 271 MG/DL
CO2 SERPL-SCNC: 31 MMOL/L (ref 21–32)
CO2 SERPL-SCNC: 32 MMOL/L (ref 21–32)
COLOR UR: ABNORMAL
CREAT SERPL-MCNC: 0.72 MG/DL (ref 0.55–1.02)
CREAT SERPL-MCNC: 0.77 MG/DL (ref 0.55–1.02)
EPITH CASTS URNS QL MICRO: ABNORMAL /LPF
ERYTHROCYTE [DISTWIDTH] IN BLOOD BY AUTOMATED COUNT: 15 % (ref 11.5–14.5)
EST. AVERAGE GLUCOSE BLD GHB EST-MCNC: 108 MG/DL
GLOBULIN SER CALC-MCNC: 3.8 G/DL (ref 2–4)
GLUCOSE SERPL-MCNC: 101 MG/DL (ref 65–100)
GLUCOSE SERPL-MCNC: 101 MG/DL (ref 65–100)
GLUCOSE UR STRIP.AUTO-MCNC: NEGATIVE MG/DL
HBA1C MFR BLD: 5.4 % (ref 4–5.6)
HCT VFR BLD AUTO: 46 % (ref 35–47)
HDLC SERPL-MCNC: 67 MG/DL
HDLC SERPL: 4 (ref 0–5)
HGB BLD-MCNC: 14 G/DL (ref 11.5–16)
HGB UR QL STRIP: NEGATIVE
KETONES UR QL STRIP.AUTO: NEGATIVE MG/DL
LDLC SERPL CALC-MCNC: 184.4 MG/DL (ref 0–100)
LEUKOCYTE ESTERASE UR QL STRIP.AUTO: ABNORMAL
MCH RBC QN AUTO: 26.4 PG (ref 26–34)
MCHC RBC AUTO-ENTMCNC: 30.4 G/DL (ref 30–36.5)
MCV RBC AUTO: 86.8 FL (ref 80–99)
NITRITE UR QL STRIP.AUTO: NEGATIVE
NRBC # BLD: 0 K/UL (ref 0–0.01)
NRBC BLD-RTO: 0 PER 100 WBC
PH UR STRIP: 6.5 (ref 5–8)
PLATELET # BLD AUTO: 299 K/UL (ref 150–400)
PMV BLD AUTO: 10.2 FL (ref 8.9–12.9)
POTASSIUM SERPL-SCNC: 3.6 MMOL/L (ref 3.5–5.1)
POTASSIUM SERPL-SCNC: 3.8 MMOL/L (ref 3.5–5.1)
PROT SERPL-MCNC: 7.8 G/DL (ref 6.4–8.2)
PROT UR STRIP-MCNC: NEGATIVE MG/DL
RBC # BLD AUTO: 5.3 M/UL (ref 3.8–5.2)
RBC #/AREA URNS HPF: ABNORMAL /HPF (ref 0–5)
SODIUM SERPL-SCNC: 142 MMOL/L (ref 136–145)
SODIUM SERPL-SCNC: 142 MMOL/L (ref 136–145)
SP GR UR REFRACTOMETRY: 1.02 (ref 1–1.03)
TRIGL SERPL-MCNC: 98 MG/DL
TSH SERPL DL<=0.05 MIU/L-ACNC: 1.24 UIU/ML (ref 0.36–3.74)
UROBILINOGEN UR QL STRIP.AUTO: 0.2 EU/DL (ref 0.2–1)
VLDLC SERPL CALC-MCNC: 19.6 MG/DL
WBC # BLD AUTO: 8.1 K/UL (ref 3.6–11)
WBC URNS QL MICRO: ABNORMAL /HPF (ref 0–4)

## 2024-04-12 PROCEDURE — 99497 ADVNCD CARE PLAN 30 MIN: CPT | Performed by: FAMILY MEDICINE

## 2024-04-12 PROCEDURE — 3074F SYST BP LT 130 MM HG: CPT | Performed by: FAMILY MEDICINE

## 2024-04-12 PROCEDURE — G0439 PPPS, SUBSEQ VISIT: HCPCS | Performed by: FAMILY MEDICINE

## 2024-04-12 PROCEDURE — 1123F ACP DISCUSS/DSCN MKR DOCD: CPT | Performed by: FAMILY MEDICINE

## 2024-04-12 PROCEDURE — 3078F DIAST BP <80 MM HG: CPT | Performed by: FAMILY MEDICINE

## 2024-04-12 SDOH — ECONOMIC STABILITY: FOOD INSECURITY: WITHIN THE PAST 12 MONTHS, YOU WORRIED THAT YOUR FOOD WOULD RUN OUT BEFORE YOU GOT MONEY TO BUY MORE.: NEVER TRUE

## 2024-04-12 SDOH — ECONOMIC STABILITY: FOOD INSECURITY: WITHIN THE PAST 12 MONTHS, THE FOOD YOU BOUGHT JUST DIDN'T LAST AND YOU DIDN'T HAVE MONEY TO GET MORE.: NEVER TRUE

## 2024-04-12 SDOH — ECONOMIC STABILITY: INCOME INSECURITY: HOW HARD IS IT FOR YOU TO PAY FOR THE VERY BASICS LIKE FOOD, HOUSING, MEDICAL CARE, AND HEATING?: NOT HARD AT ALL

## 2024-04-12 SDOH — ECONOMIC STABILITY: HOUSING INSECURITY
IN THE LAST 12 MONTHS, WAS THERE A TIME WHEN YOU DID NOT HAVE A STEADY PLACE TO SLEEP OR SLEPT IN A SHELTER (INCLUDING NOW)?: NO

## 2024-04-12 ASSESSMENT — LIFESTYLE VARIABLES
HOW OFTEN DO YOU HAVE A DRINK CONTAINING ALCOHOL: MONTHLY OR LESS
HOW MANY STANDARD DRINKS CONTAINING ALCOHOL DO YOU HAVE ON A TYPICAL DAY: 1 OR 2

## 2024-04-12 ASSESSMENT — PATIENT HEALTH QUESTIONNAIRE - PHQ9
SUM OF ALL RESPONSES TO PHQ QUESTIONS 1-9: 0
1. LITTLE INTEREST OR PLEASURE IN DOING THINGS: NOT AT ALL
SUM OF ALL RESPONSES TO PHQ9 QUESTIONS 1 & 2: 0
SUM OF ALL RESPONSES TO PHQ QUESTIONS 1-9: 0
2. FEELING DOWN, DEPRESSED OR HOPELESS: NOT AT ALL

## 2024-04-12 NOTE — PATIENT INSTRUCTIONS
by using a shoulder bag or backpack.  Try to walk in well-lit areas. Watch out for uneven ground, changes in pavement, and debris.  Be careful in the winter. Walk on the grass or gravel when sidewalks are slippery. Use de-icer on steps and walkways. Add non-slip devices to shoes.    Put grab bars and nonskid mats in your shower or tub and near the toilet. Try to use a shower chair or bath bench when bathing.   Get into a tub or shower by putting in your weaker leg first. Get out with your strong side first. Have a phone or medical alert device in the bathroom with you.   Where can you learn more?  Go to https://www.[employer].net/patientEd and enter G117 to learn more about \"Preventing Falls: Care Instructions.\"  Current as of: July 17, 2023               Content Version: 14.0  © 2006-2024 [employer].   Care instructions adapted under license by [employer]. If you have questions about a medical condition or this instruction, always ask your healthcare professional. [employer] disclaims any warranty or liability for your use of this information.           Advance Directives: Care Instructions  Overview  An advance directive is a legal way to state your wishes at the end of your life. It tells your family and your doctor what to do if you can't say what you want.  There are two main types of advance directives. You can change them any time your wishes change.  Living will.  This form tells your family and your doctor your wishes about life support and other treatment. The form is also called a declaration.  Medical power of .  This form lets you name a person to make treatment decisions for you when you can't speak for yourself. This person is called a health care agent (health care proxy, health care surrogate). The form is also called a durable power of  for health care.  If you do not have an advance directive, decisions about your medical care may be made by a family

## 2024-04-12 NOTE — PROGRESS NOTES
Medicare Annual Wellness Visit    Amy Guillaume is here for Medicare AWV    Assessment & Plan   Welcome to Medicare preventive visit  ACP (advance care planning)  -     PA Advanced Care Planning (16-30 minutes) [15446]  Recommendations for Preventive Services Due: see orders and patient instructions/AVS.  Recommended screening schedule for the next 5-10 years is provided to the patient in written form: see Patient Instructions/AVS.     Return in 6 months (on 10/12/2024).     Subjective       End-of-Life planning (with patient's consent)  Pneumococcal Vaccine,Influenza Vaccine uptodate    Screening Pap and pelvic (covered once every 2 years) not needed,   Colorectal cancer screening tests was not done due to the cleansing  Bone mass measurement (DEXA) done in 2022 with osteopenia on caltrate bid  Screening for malignant breast cancer done in oct of 2023,   Screening for lung cancer  none smoker        Patient's complete Health Risk Assessment and screening values have been reviewed and are found in Flowsheets. The following problems were reviewed today and where indicated follow up appointments were made and/or referrals ordered.    Positive Risk Factor Screenings with Interventions:                Activity, Diet, and Weight:  On average, how many days per week do you engage in moderate to strenuous exercise (like a brisk walk)?: 0 days  On average, how many minutes do you engage in exercise at this level?: 0 min    Do you eat balanced/healthy meals regularly?: Yes    Body mass index is 25.63 kg/m².      Inactivity Interventions:  Patient advised to follow up in the office for further evaluation and treatment        Dentist Screen:  Have you seen the dentist within the past year?: (!) No    Intervention:  Advised to schedule with their dentist     Vision Screen:  Do you have difficulty driving, watching TV, or doing any of your daily activities because of your eyesight?: No  Have you had an eye exam within the

## 2024-04-12 NOTE — PROGRESS NOTES
Chief Complaint   Patient presents with    Medicare AWV       \"Have you been to the ER, urgent care clinic since your last visit?  Hospitalized since your last visit?\"    NO    “Have you seen or consulted any other health care providers outside of [facility] since your last visit?”    NO    “Have you had a colorectal cancer screening such as a colonoscopy/FIT/Cologuard?    NO    Date of last Colonoscopy: 2021  Date of last Cologuard: 2019  No FIT/FOBT on file   No flexible sigmoidoscopy on file        Vitals:    24 1043 24 1048   BP: (!) 144/78 124/78   Site: Left Upper Arm Right Upper Arm   Position: Sitting Sitting   Cuff Size: Large Adult Large Adult   Pulse: 75    Resp: 19    Temp: 97.4 °F (36.3 °C)    TempSrc: Infrared    SpO2: 95%    Weight: 66.7 kg (147 lb)    Height: 1.613 m (5' 3.5\")         Health Maintenance Due   Topic Date Due    Colorectal Cancer Screen  2022    Annual Wellness Visit (Medicare Advantage)  Never done    A1C test (Diabetic or Prediabetic)  2024        The patient, Amy Guillaume, identity was verified by name and

## 2024-04-17 NOTE — TELEPHONE ENCOUNTER
Last appointment: 4/12/24  Next appointment: Advised to follow-up 10/12/24  Previous refill encounter(s): 11/17/23 #90    Requested Prescriptions     Pending Prescriptions Disp Refills    venlafaxine (EFFEXOR) 75 MG tablet [Pharmacy Med Name: VENLAFAXINE HCL 75 MG TABLET] 90 tablet 1     Sig: TAKE 1 TABLET BY MOUTH DAILY         For Pharmacy Admin Tracking Only    Program: Medication Refill  CPA in place:    Recommendation Provided To:   Intervention Detail: New Rx: 1, reason: Patient Preference  Intervention Accepted By:   Gap Closed?:    Time Spent (min): 5

## 2024-04-18 RX ORDER — VENLAFAXINE 75 MG/1
75 TABLET ORAL DAILY
Qty: 90 TABLET | Refills: 1 | Status: SHIPPED | OUTPATIENT
Start: 2024-04-18

## 2024-06-19 NOTE — TELEPHONE ENCOUNTER
Last appointment: 4/12/24  Next appointment: Advised to follow-up 10/12/24  Previous refill encounter(s): 12/15/23 #90 with 1 refill    Requested Prescriptions     Pending Prescriptions Disp Refills    hydroCHLOROthiazide (HYDRODIURIL) 25 MG tablet [Pharmacy Med Name: hydroCHLOROthiazide 25 MG TABLET] 90 tablet 1     Sig: TAKE 1 TABLET BY MOUTH DAILY         For Pharmacy Admin Tracking Only    Program: Medication Refill  CPA in place:    Recommendation Provided To:   Intervention Detail: New Rx: 1, reason: Patient Preference  Intervention Accepted By:   Gap Closed?:    Time Spent (min): 5

## 2024-06-20 RX ORDER — HYDROCHLOROTHIAZIDE 25 MG/1
25 TABLET ORAL DAILY
Qty: 90 TABLET | Refills: 1 | Status: SHIPPED | OUTPATIENT
Start: 2024-06-20

## 2024-08-23 NOTE — TELEPHONE ENCOUNTER
Last appointment: 4/12/24  Next appointment: Advised to follow-up 10/12/24  Previous refill encounter(s): 2/26/24 #90 with 3 refills    Requested Prescriptions     Pending Prescriptions Disp Refills    ferrous sulfate (FEROSUL) 325 (65 Fe) MG tablet [Pharmacy Med Name: FEROSUL 325 MG TABLET] 270 tablet 1     Sig: TAKE ONE TABLET BY MOUTH THREE TIMES A DAY WITH A MEAL         For Pharmacy Admin Tracking Only    Program: Medication Refill  CPA in place:    Recommendation Provided To:   Intervention Detail: New Rx: 1, reason: Patient Preference  Intervention Accepted By:   Gap Closed?:    Time Spent (min): 5

## 2024-08-26 RX ORDER — FERROUS SULFATE 325(65) MG
TABLET ORAL
Qty: 270 TABLET | Refills: 1 | Status: SHIPPED | OUTPATIENT
Start: 2024-08-26

## 2024-08-28 ENCOUNTER — TRANSCRIBE ORDERS (OUTPATIENT)
Facility: HOSPITAL | Age: 74
End: 2024-08-28

## 2024-08-28 DIAGNOSIS — Z12.31 SCREENING MAMMOGRAM FOR HIGH-RISK PATIENT: Primary | ICD-10-CM

## 2024-10-25 ENCOUNTER — HOSPITAL ENCOUNTER (OUTPATIENT)
Facility: HOSPITAL | Age: 74
Discharge: HOME | End: 2024-10-28
Attending: FAMILY MEDICINE
Payer: MEDICARE

## 2024-10-25 VITALS — BODY MASS INDEX: 28 KG/M2 | HEIGHT: 63 IN | WEIGHT: 158 LBS

## 2024-10-25 DIAGNOSIS — Z12.31 ENCOUNTER FOR SCREENING MAMMOGRAM FOR HIGH-RISK PATIENT: ICD-10-CM

## 2024-10-25 PROCEDURE — 77063 BREAST TOMOSYNTHESIS BI: CPT

## 2024-10-30 ENCOUNTER — IMMUNIZATION (OUTPATIENT)
Age: 74
End: 2024-10-30
Payer: MEDICARE

## 2024-10-30 VITALS
HEART RATE: 72 BPM | DIASTOLIC BLOOD PRESSURE: 72 MMHG | TEMPERATURE: 98.3 F | RESPIRATION RATE: 16 BRPM | OXYGEN SATURATION: 96 % | SYSTOLIC BLOOD PRESSURE: 145 MMHG

## 2024-10-30 DIAGNOSIS — Z23 NEED FOR VACCINATION: Primary | ICD-10-CM

## 2024-10-30 PROCEDURE — PBSHW INFLUENZA, FLUAD TRIVALENT, (AGE 65 Y+), IM, PRESERVATIVE FREE, 0.5ML: Performed by: FAMILY MEDICINE

## 2024-10-30 PROCEDURE — 90653 IIV ADJUVANT VACCINE IM: CPT | Performed by: FAMILY MEDICINE

## 2024-10-30 NOTE — PROGRESS NOTES
Chief Complaint   Patient presents with    Flu Vaccine       After obtaining informed consent, the Fluad immunization is given by LUIS NOBLE per verbal order from Dr. Loo. Observed for 15 min, no reaction.      
DISPLAY PLAN FREE TEXT

## 2024-12-13 RX ORDER — CELECOXIB 200 MG/1
CAPSULE ORAL
Qty: 60 CAPSULE | Refills: 3 | OUTPATIENT
Start: 2024-12-13

## 2024-12-19 RX ORDER — HYDROCHLOROTHIAZIDE 25 MG/1
25 TABLET ORAL DAILY
Qty: 90 TABLET | Refills: 1 | Status: SHIPPED | OUTPATIENT
Start: 2024-12-19

## 2024-12-19 NOTE — TELEPHONE ENCOUNTER
Last appointment: 4/12/24  Next appointment: Advised to follow-up 10/12/24  Previous refill encounter(s): 6/20/24 #90 with 1 refill    Requested Prescriptions     Pending Prescriptions Disp Refills    hydroCHLOROthiazide (HYDRODIURIL) 25 MG tablet [Pharmacy Med Name: hydroCHLOROthiazide 25 MG TABLET] 90 tablet 1     Sig: TAKE 1 TABLET BY MOUTH DAILY         For Pharmacy Admin Tracking Only    Program: Medication Refill  CPA in place:    Recommendation Provided To:   Intervention Detail: New Rx: 1, reason: Patient Preference  Intervention Accepted By:   Gap Closed?:    Time Spent (min): 5

## 2024-12-20 ENCOUNTER — HOSPITAL ENCOUNTER (OUTPATIENT)
Age: 74
Discharge: HOME | End: 2024-12-23
Payer: MEDICARE

## 2024-12-20 DIAGNOSIS — M54.12 RADICULOPATHY, CERVICAL REGION: ICD-10-CM

## 2024-12-20 PROCEDURE — 72141 MRI NECK SPINE W/O DYE: CPT

## 2025-04-07 NOTE — TELEPHONE ENCOUNTER
Last appointment: 4/12/24  Next appointment: 5/6/25  Previous refill encounter(s): 4/18/24     Requested Prescriptions     Pending Prescriptions Disp Refills    venlafaxine (EFFEXOR) 75 MG tablet [Pharmacy Med Name: VENLAFAXINE HCL 75 MG TABLET] 90 tablet 1     Sig: TAKE 1 TABLET BY MOUTH DAILY         For Pharmacy Admin Tracking Only    Program: Medication Refill  CPA in place:    Recommendation Provided To:   Intervention Detail: New Rx: 1, reason: Patient Preference  Intervention Accepted By:   Gap Closed?:    Time Spent (min): 5

## 2025-04-11 RX ORDER — VENLAFAXINE 75 MG/1
75 TABLET ORAL DAILY
Qty: 90 TABLET | Refills: 1 | Status: SHIPPED | OUTPATIENT
Start: 2025-04-11

## 2025-05-02 ENCOUNTER — TELEPHONE (OUTPATIENT)
Age: 75
End: 2025-05-02

## 2025-05-02 RX ORDER — ERGOCALCIFEROL 1.25 MG/1
50000 CAPSULE, LIQUID FILLED ORAL WEEKLY
Qty: 12 CAPSULE | Refills: 3 | Status: SHIPPED | OUTPATIENT
Start: 2025-05-02

## 2025-05-02 NOTE — TELEPHONE ENCOUNTER
Attempted to contact patient regarding upcoming Medicare wellness appointment and completion of HRA questionnaire. LVM for patient to please return call at  [phone].

## 2025-05-02 NOTE — TELEPHONE ENCOUNTER
Last appointment: 4/12/24  Next appointment: 5/6/25  Previous refill encounter(s): 4/11/24    Requested Prescriptions     Pending Prescriptions Disp Refills    vitamin D (ERGOCALCIFEROL) 1.25 MG (59251 UT) CAPS capsule [Pharmacy Med Name: VIT D2 (ERGOCAL) 1.25MG(50,000U) CP] 12 capsule 3     Sig: TAKE 1 CAPSULE BY MOUTH ONCE WEEKLY         For Pharmacy Admin Tracking Only    Program: Medication Refill  CPA in place:    Recommendation Provided To:   Intervention Detail: New Rx: 1, reason: Patient Preference  Intervention Accepted By:   Gap Closed?:    Time Spent (min): 5

## 2025-05-05 SDOH — HEALTH STABILITY: PHYSICAL HEALTH: ON AVERAGE, HOW MANY DAYS PER WEEK DO YOU ENGAGE IN MODERATE TO STRENUOUS EXERCISE (LIKE A BRISK WALK)?: 2 DAYS

## 2025-05-05 ASSESSMENT — PATIENT HEALTH QUESTIONNAIRE - PHQ9
1. LITTLE INTEREST OR PLEASURE IN DOING THINGS: NOT AT ALL
SUM OF ALL RESPONSES TO PHQ QUESTIONS 1-9: 0
2. FEELING DOWN, DEPRESSED OR HOPELESS: NOT AT ALL
SUM OF ALL RESPONSES TO PHQ QUESTIONS 1-9: 0

## 2025-05-05 ASSESSMENT — LIFESTYLE VARIABLES
HOW MANY STANDARD DRINKS CONTAINING ALCOHOL DO YOU HAVE ON A TYPICAL DAY: 1 OR 2
HOW OFTEN DO YOU HAVE SIX OR MORE DRINKS ON ONE OCCASION: 1
HOW MANY STANDARD DRINKS CONTAINING ALCOHOL DO YOU HAVE ON A TYPICAL DAY: 1
HOW OFTEN DO YOU HAVE A DRINK CONTAINING ALCOHOL: 4
HOW OFTEN DO YOU HAVE A DRINK CONTAINING ALCOHOL: 2-3 TIMES A WEEK

## 2025-05-06 ENCOUNTER — CLINICAL DOCUMENTATION (OUTPATIENT)
Age: 75
End: 2025-05-06

## 2025-05-06 ENCOUNTER — OFFICE VISIT (OUTPATIENT)
Age: 75
End: 2025-05-06
Payer: MEDICARE

## 2025-05-06 VITALS
BODY MASS INDEX: 25.87 KG/M2 | WEIGHT: 146 LBS | HEIGHT: 63 IN | RESPIRATION RATE: 17 BRPM | DIASTOLIC BLOOD PRESSURE: 81 MMHG | OXYGEN SATURATION: 95 % | SYSTOLIC BLOOD PRESSURE: 139 MMHG | HEART RATE: 76 BPM | TEMPERATURE: 97.5 F

## 2025-05-06 DIAGNOSIS — M25.562 CHRONIC PAIN OF BOTH KNEES: ICD-10-CM

## 2025-05-06 DIAGNOSIS — E78.00 HYPERCHOLESTEROLEMIA: ICD-10-CM

## 2025-05-06 DIAGNOSIS — G89.4 CHRONIC PAIN SYNDROME: ICD-10-CM

## 2025-05-06 DIAGNOSIS — R79.9 ABNORMAL FINDING OF BLOOD CHEMISTRY, UNSPECIFIED: ICD-10-CM

## 2025-05-06 DIAGNOSIS — M25.561 CHRONIC PAIN OF BOTH KNEES: ICD-10-CM

## 2025-05-06 DIAGNOSIS — I10 PRIMARY HYPERTENSION: ICD-10-CM

## 2025-05-06 DIAGNOSIS — R21 RASH, SKIN: ICD-10-CM

## 2025-05-06 DIAGNOSIS — G89.29 CHRONIC PAIN OF BOTH KNEES: ICD-10-CM

## 2025-05-06 DIAGNOSIS — Z00.00 MEDICARE ANNUAL WELLNESS VISIT, SUBSEQUENT: Primary | ICD-10-CM

## 2025-05-06 LAB
ALBUMIN SERPL-MCNC: 3.6 G/DL (ref 3.5–5)
ALBUMIN/GLOB SERPL: 0.9 (ref 1.1–2.2)
ALP SERPL-CCNC: 88 U/L (ref 45–117)
ALT SERPL-CCNC: 14 U/L (ref 12–78)
ANION GAP SERPL CALC-SCNC: 3 MMOL/L (ref 2–12)
AST SERPL-CCNC: 12 U/L (ref 15–37)
BASOPHILS # BLD: 0.05 K/UL (ref 0–0.1)
BASOPHILS NFR BLD: 0.5 % (ref 0–1)
BILIRUB SERPL-MCNC: 0.3 MG/DL (ref 0.2–1)
BUN SERPL-MCNC: 13 MG/DL (ref 6–20)
BUN/CREAT SERPL: 21 (ref 12–20)
CALCIUM SERPL-MCNC: 9.5 MG/DL (ref 8.5–10.1)
CHLORIDE SERPL-SCNC: 106 MMOL/L (ref 97–108)
CHOLEST SERPL-MCNC: 194 MG/DL
CO2 SERPL-SCNC: 32 MMOL/L (ref 21–32)
CREAT SERPL-MCNC: 0.63 MG/DL (ref 0.55–1.02)
DIFFERENTIAL METHOD BLD: ABNORMAL
EOSINOPHIL # BLD: 0.24 K/UL (ref 0–0.4)
EOSINOPHIL NFR BLD: 2.5 % (ref 0–7)
ERYTHROCYTE [DISTWIDTH] IN BLOOD BY AUTOMATED COUNT: 14.9 % (ref 11.5–14.5)
EST. AVERAGE GLUCOSE BLD GHB EST-MCNC: 111 MG/DL
GLOBULIN SER CALC-MCNC: 4.2 G/DL (ref 2–4)
GLUCOSE SERPL-MCNC: 91 MG/DL (ref 65–100)
HBA1C MFR BLD: 5.5 % (ref 4–5.6)
HCT VFR BLD AUTO: 39.8 % (ref 35–47)
HDLC SERPL-MCNC: 50 MG/DL
HDLC SERPL: 3.9 (ref 0–5)
HGB BLD-MCNC: 12.3 G/DL (ref 11.5–16)
IMM GRANULOCYTES # BLD AUTO: 0.03 K/UL (ref 0–0.04)
IMM GRANULOCYTES NFR BLD AUTO: 0.3 % (ref 0–0.5)
LDLC SERPL CALC-MCNC: 122 MG/DL (ref 0–100)
LYMPHOCYTES # BLD: 2.77 K/UL (ref 0.8–3.5)
LYMPHOCYTES NFR BLD: 29.2 % (ref 12–49)
MCH RBC QN AUTO: 25.6 PG (ref 26–34)
MCHC RBC AUTO-ENTMCNC: 30.9 G/DL (ref 30–36.5)
MCV RBC AUTO: 82.9 FL (ref 80–99)
MONOCYTES # BLD: 0.58 K/UL (ref 0–1)
MONOCYTES NFR BLD: 6.1 % (ref 5–13)
NEUTS SEG # BLD: 5.81 K/UL (ref 1.8–8)
NEUTS SEG NFR BLD: 61.4 % (ref 32–75)
NRBC # BLD: 0 K/UL (ref 0–0.01)
NRBC BLD-RTO: 0 PER 100 WBC
PLATELET # BLD AUTO: 314 K/UL (ref 150–400)
PMV BLD AUTO: 10.6 FL (ref 8.9–12.9)
POTASSIUM SERPL-SCNC: 3.7 MMOL/L (ref 3.5–5.1)
PROT SERPL-MCNC: 7.8 G/DL (ref 6.4–8.2)
RBC # BLD AUTO: 4.8 M/UL (ref 3.8–5.2)
SODIUM SERPL-SCNC: 141 MMOL/L (ref 136–145)
T4 FREE SERPL-MCNC: 0.9 NG/DL (ref 0.8–1.5)
TRIGL SERPL-MCNC: 110 MG/DL
TSH SERPL DL<=0.05 MIU/L-ACNC: 3.18 UIU/ML (ref 0.36–3.74)
VLDLC SERPL CALC-MCNC: 22 MG/DL
WBC # BLD AUTO: 9.5 K/UL (ref 3.6–11)

## 2025-05-06 PROCEDURE — G0439 PPPS, SUBSEQ VISIT: HCPCS | Performed by: FAMILY MEDICINE

## 2025-05-06 PROCEDURE — 1160F RVW MEDS BY RX/DR IN RCRD: CPT | Performed by: FAMILY MEDICINE

## 2025-05-06 PROCEDURE — 99213 OFFICE O/P EST LOW 20 MIN: CPT | Performed by: FAMILY MEDICINE

## 2025-05-06 PROCEDURE — 1090F PRES/ABSN URINE INCON ASSESS: CPT | Performed by: FAMILY MEDICINE

## 2025-05-06 PROCEDURE — 1123F ACP DISCUSS/DSCN MKR DOCD: CPT | Performed by: FAMILY MEDICINE

## 2025-05-06 PROCEDURE — G8399 PT W/DXA RESULTS DOCUMENT: HCPCS | Performed by: FAMILY MEDICINE

## 2025-05-06 PROCEDURE — 1126F AMNT PAIN NOTED NONE PRSNT: CPT | Performed by: FAMILY MEDICINE

## 2025-05-06 PROCEDURE — 1159F MED LIST DOCD IN RCRD: CPT | Performed by: FAMILY MEDICINE

## 2025-05-06 PROCEDURE — 3075F SYST BP GE 130 - 139MM HG: CPT | Performed by: FAMILY MEDICINE

## 2025-05-06 PROCEDURE — G8419 CALC BMI OUT NRM PARAM NOF/U: HCPCS | Performed by: FAMILY MEDICINE

## 2025-05-06 PROCEDURE — G8427 DOCREV CUR MEDS BY ELIG CLIN: HCPCS | Performed by: FAMILY MEDICINE

## 2025-05-06 PROCEDURE — 1036F TOBACCO NON-USER: CPT | Performed by: FAMILY MEDICINE

## 2025-05-06 PROCEDURE — 3079F DIAST BP 80-89 MM HG: CPT | Performed by: FAMILY MEDICINE

## 2025-05-06 RX ORDER — FLUCONAZOLE 150 MG/1
150 TABLET ORAL DAILY
Qty: 3 TABLET | Refills: 0 | Status: SHIPPED | OUTPATIENT
Start: 2025-05-06 | End: 2025-05-09

## 2025-05-06 RX ORDER — ZOSTER VACCINE RECOMBINANT, ADJUVANTED 50 MCG/0.5
0.5 KIT INTRAMUSCULAR SEE ADMIN INSTRUCTIONS
Qty: 0.5 ML | Refills: 0 | Status: SHIPPED | OUTPATIENT
Start: 2025-05-06 | End: 2025-11-02

## 2025-05-06 RX ORDER — TRAMADOL HYDROCHLORIDE 50 MG/1
50 TABLET ORAL 2 TIMES DAILY PRN
Qty: 40 TABLET | Refills: 0 | Status: SHIPPED | OUTPATIENT
Start: 2025-05-06 | End: 2025-06-05

## 2025-05-06 RX ORDER — NYSTATIN 100000 U/G
CREAM TOPICAL
Qty: 30 G | Refills: 3 | Status: SHIPPED | OUTPATIENT
Start: 2025-05-06

## 2025-05-06 SDOH — ECONOMIC STABILITY: FOOD INSECURITY: WITHIN THE PAST 12 MONTHS, YOU WORRIED THAT YOUR FOOD WOULD RUN OUT BEFORE YOU GOT MONEY TO BUY MORE.: NEVER TRUE

## 2025-05-06 SDOH — ECONOMIC STABILITY: FOOD INSECURITY: WITHIN THE PAST 12 MONTHS, THE FOOD YOU BOUGHT JUST DIDN'T LAST AND YOU DIDN'T HAVE MONEY TO GET MORE.: NEVER TRUE

## 2025-05-06 NOTE — PROGRESS NOTES
Medicare Annual Wellness Visit    Amy Guillaume is here for Medicare AWV    Assessment & Plan   Medicare annual wellness visit, subsequent  -     zoster recombinant adjuvanted vaccine (SHINGRIX) 50 MCG/0.5ML SUSR injection; Inject 0.5 mLs into the muscle See Admin Instructions 1 dose now and repeat in 2-6 months, Disp-0.5 mL, R-0Normal  Rash, skin  -     Lipid Panel; Future  -     Hemoglobin A1C; Future  -     CBC with Auto Differential; Future  -     Comprehensive Metabolic Panel; Future  -     TSH + Free T4 Panel; Future  -     traMADol (ULTRAM) 50 MG tablet; Take 1 tablet by mouth 2 times daily as needed for Pain for up to 30 days. Intended supply: 3 days. Take lowest dose possible to manage pain Max Daily Amount: 100 mg, Disp-40 tablet, R-0Normal  Chronic pain syndrome  -     traMADol (ULTRAM) 50 MG tablet; Take 1 tablet by mouth 2 times daily as needed for Pain for up to 30 days. Intended supply: 3 days. Take lowest dose possible to manage pain Max Daily Amount: 100 mg, Disp-40 tablet, R-0Normal  -     nystatin (MYCOSTATIN) 464993 UNIT/GM cream; Apply topically 2 times daily., Disp-30 g, R-3, Normal  -     fluconazole (DIFLUCAN) 150 MG tablet; Take 1 tablet by mouth daily for 3 days, Disp-3 tablet, R-0Normal  Chronic pain of both knees  -     traMADol (ULTRAM) 50 MG tablet; Take 1 tablet by mouth 2 times daily as needed for Pain for up to 30 days. Intended supply: 3 days. Take lowest dose possible to manage pain Max Daily Amount: 100 mg, Disp-40 tablet, R-0Normal  Primary hypertension  -     Lipid Panel; Future  -     Hemoglobin A1C; Future  -     CBC with Auto Differential; Future  -     Comprehensive Metabolic Panel; Future  -     TSH + Free T4 Panel; Future  Hypercholesterolemia  -     Lipid Panel; Future  -     Hemoglobin A1C; Future  -     CBC with Auto Differential; Future  -     Comprehensive Metabolic Panel; Future  -     TSH + Free T4 Panel; Future  Abnormal finding of blood chemistry,

## 2025-05-06 NOTE — PROGRESS NOTES
Chief Complaint   Patient presents with    Medicare AWV       \"Have you been to the ER, urgent care clinic since your last visit?  Hospitalized since your last visit?\"    NO    “Have you seen or consulted any other health care providers outside of [facility] since your last visit?”    NO      Click Here for Release of Records Request     No results found for this visit on 25.   Vitals:    25 0937   BP: 139/81   Pulse: 76   Resp: 17   Temp: 97.5 °F (36.4 °C)   TempSrc: Temporal   SpO2: 95%   Weight: 66.2 kg (146 lb)   Height: 1.6 m (5' 3\")        The patient, Amy Guillaume, identity was verified by name and .

## 2025-05-06 NOTE — PATIENT INSTRUCTIONS
tend to feel dizzy after you take medicine, avoid activity at that time. Try being active before you take your medicine. This will reduce your risk of falls.  If you plan to be active at home, make sure to clear your space before you get started. Remove things like TV cords, coffee tables, and throw rugs. It's safest to have plenty of space to move freely.  The key to getting more active is to take it slow and steady. Try to improve only a little bit at a time. Pick just one area to improve on at first. And if an activity hurts, stop and talk to your doctor.  Where can you learn more?  Go to https://www.[employer].net/patientEd and enter P600 to learn more about \"Learning About Being Active as an Older Adult.\"  Current as of: July 31, 2024  Content Version: 14.4  © 0207-5378 [email or web address].   Care instructions adapted under license by [employer]. If you have questions about a medical condition or this instruction, always ask your healthcare professional. [employer] disclaims any warranty or liability for your use of this information.         Learning About Vision Tests  What are vision tests?     The four most common vision tests are visual acuity tests, refraction, visual field tests, and color vision tests.  Visual acuity (sharpness) tests  These tests are used:  To see if you need glasses or contact lenses.  To monitor an eye problem.  To check an eye injury.  Visual acuity tests are done as part of routine exams. You may also have this test when you get your 's license or apply for some types of jobs.  Visual field tests  These tests are used:  To check for vision loss in any area of your range of vision.  To screen for certain eye diseases.  To look for nerve damage after a stroke, head injury, or other problem that could reduce blood flow to the brain.  Refraction and color tests  A refraction test is done to find the right prescription for glasses and contact lenses.  A color

## 2025-05-09 ENCOUNTER — RESULTS FOLLOW-UP (OUTPATIENT)
Age: 75
End: 2025-05-09

## 2025-05-22 NOTE — TELEPHONE ENCOUNTER
Last appointment: 5/6/25  Next appointment: Advised to follow-up 5/2026  Previous refill encounter(s): 8/26/24 #270 with 1 refill    Requested Prescriptions     Pending Prescriptions Disp Refills    ferrous sulfate (FEROSUL) 325 (65 Fe) MG tablet [Pharmacy Med Name: FEROSUL 325 MG TABLET] 270 tablet 3     Sig: TAKE 1 TABLET BY MOUTH 3 TIMES A DAY WITH A MEAL         For Pharmacy Admin Tracking Only    Program: Medication Refill  CPA in place:    Recommendation Provided To:   Intervention Detail: New Rx: 1, reason: Patient Preference  Intervention Accepted By:   Gap Closed?:    Time Spent (min): 5

## 2025-05-24 RX ORDER — FERROUS SULFATE 325(65) MG
TABLET ORAL
Qty: 270 TABLET | Refills: 3 | Status: SHIPPED | OUTPATIENT
Start: 2025-05-24

## 2025-06-13 RX ORDER — CALCIUM CARBONATE/VITAMIN D3 600 MG-10
1 TABLET ORAL DAILY
Qty: 90 TABLET | Refills: 3 | Status: SHIPPED | OUTPATIENT
Start: 2025-06-13

## 2025-06-18 RX ORDER — HYDROCHLOROTHIAZIDE 25 MG/1
25 TABLET ORAL DAILY
Qty: 90 TABLET | Refills: 3 | Status: SHIPPED | OUTPATIENT
Start: 2025-06-18

## 2025-06-18 NOTE — TELEPHONE ENCOUNTER
Last appointment: 5/6/25  Next appointment: Advised to follow-up 5/2026  Previous refill encounter(s): 12/19/24 #90 with 1 refill    Requested Prescriptions     Pending Prescriptions Disp Refills    hydroCHLOROthiazide (HYDRODIURIL) 25 MG tablet [Pharmacy Med Name: hydroCHLOROthiazide 25 MG TABLET] 90 tablet 3     Sig: TAKE 1 TABLET BY MOUTH DAILY         For Pharmacy Admin Tracking Only    Program: Medication Refill  CPA in place:    Recommendation Provided To:   Intervention Detail: New Rx: 1, reason: Patient Preference  Intervention Accepted By:   Gap Closed?:    Time Spent (min): 5

## 2025-08-19 ENCOUNTER — TRANSCRIBE ORDERS (OUTPATIENT)
Facility: HOSPITAL | Age: 75
End: 2025-08-19

## 2025-08-19 DIAGNOSIS — Z12.31 ENCOUNTER FOR SCREENING MAMMOGRAM FOR MALIGNANT NEOPLASM OF BREAST: Primary | ICD-10-CM

## (undated) DEVICE — ELECTRODE,RADIOTRANSLUCENT,FOAM,5PK: Brand: MEDLINE

## (undated) DEVICE — Device

## (undated) DEVICE — SYR 10ML LUER LOK 1/5ML GRAD --

## (undated) DEVICE — NEEDLE HYPO 18GA L1.5IN PNK S STL HUB POLYPR SHLD REG BVL

## (undated) DEVICE — BASIN EMSIS 16OZ GRAPHITE PLAS KID SHP MOLD GRAD FOR ORAL

## (undated) DEVICE — BLOCK BITE ENDOSCP AD 21 MM W/ DIL BLU LF DISP

## (undated) DEVICE — NEONATAL-ADULT SPO2 SENSOR: Brand: NELLCOR

## (undated) DEVICE — SOLIDIFIER MEDC 1200ML -- CONVERT TO 356117

## (undated) DEVICE — CONTAINER SPEC 20 ML LID NEUT BUFF FORMALIN 10 % POLYPR STS

## (undated) DEVICE — Z DISCONTINUED PER MEDLINE LINE GAS SAMPLING O2/CO2 LNG AD 13 FT NSL W/ TBNG FILTERLINE

## (undated) DEVICE — YANKAUER,TAPERED BULBOUS TIP,W/O VENT: Brand: MEDLINE

## (undated) DEVICE — SET ADMIN 16ML TBNG L100IN 2 Y INJ SITE IV PIGGY BK DISP

## (undated) DEVICE — TOWEL 4 PLY TISS 19X30 SUE WHT

## (undated) DEVICE — FORCEPS BX L160CM DIA8MM GRSP DISECT CUP TIP NONLOCKING ROT

## (undated) DEVICE — SYR 3ML LL TIP 1/10ML GRAD --

## (undated) DEVICE — 1200 GUARD II KIT W/5MM TUBE W/O VAC TUBE: Brand: GUARDIAN

## (undated) DEVICE — SOLIDIFIER FLD 2OZ 1500CC N DISINF IN BTL DISP SAFESORB

## (undated) DEVICE — CATH IV AUTOGRD BC PNK 20GA 25 -- INSYTE

## (undated) DEVICE — BAG SPEC BIOHZRD 10 X 10 IN --

## (undated) DEVICE — CATH IV AUTOGRD BC BLU 22GA 25 -- INSYTE